# Patient Record
Sex: MALE | Race: ASIAN | NOT HISPANIC OR LATINO | URBAN - METROPOLITAN AREA
[De-identification: names, ages, dates, MRNs, and addresses within clinical notes are randomized per-mention and may not be internally consistent; named-entity substitution may affect disease eponyms.]

---

## 2018-10-10 ENCOUNTER — INPATIENT (INPATIENT)
Facility: HOSPITAL | Age: 53
LOS: 7 days | Discharge: HOME HEALTH | End: 2018-10-18
Attending: INTERNAL MEDICINE | Admitting: INTERNAL MEDICINE
Payer: MEDICAID

## 2018-10-10 VITALS
OXYGEN SATURATION: 100 % | RESPIRATION RATE: 16 BRPM | DIASTOLIC BLOOD PRESSURE: 98 MMHG | HEART RATE: 90 BPM | TEMPERATURE: 99 F | SYSTOLIC BLOOD PRESSURE: 160 MMHG

## 2018-10-10 DIAGNOSIS — I63.9 CEREBRAL INFARCTION, UNSPECIFIED: ICD-10-CM

## 2018-10-10 LAB
ALBUMIN SERPL ELPH-MCNC: 5.1 G/DL — HIGH (ref 3.3–5)
ALP SERPL-CCNC: 58 U/L — SIGNIFICANT CHANGE UP (ref 40–120)
ALT FLD-CCNC: 36 U/L — SIGNIFICANT CHANGE UP (ref 4–41)
APPEARANCE UR: CLEAR — SIGNIFICANT CHANGE UP
APTT BLD: 26.3 SEC — LOW (ref 27.5–37.4)
AST SERPL-CCNC: 17 U/L — SIGNIFICANT CHANGE UP (ref 4–40)
BACTERIA # UR AUTO: NEGATIVE — SIGNIFICANT CHANGE UP
BASE EXCESS BLDV CALC-SCNC: -1.6 MMOL/L — SIGNIFICANT CHANGE UP
BASE EXCESS BLDV CALC-SCNC: -1.7 MMOL/L — SIGNIFICANT CHANGE UP
BASOPHILS # BLD AUTO: 0.04 K/UL — SIGNIFICANT CHANGE UP (ref 0–0.2)
BASOPHILS NFR BLD AUTO: 0.2 % — SIGNIFICANT CHANGE UP (ref 0–2)
BILIRUB SERPL-MCNC: 0.5 MG/DL — SIGNIFICANT CHANGE UP (ref 0.2–1.2)
BILIRUB UR-MCNC: NEGATIVE — SIGNIFICANT CHANGE UP
BLOOD GAS VENOUS - CREATININE: 0.69 MG/DL — SIGNIFICANT CHANGE UP (ref 0.5–1.3)
BLOOD GAS VENOUS - CREATININE: 0.71 MG/DL — SIGNIFICANT CHANGE UP (ref 0.5–1.3)
BLOOD UR QL VISUAL: NEGATIVE — SIGNIFICANT CHANGE UP
BUN SERPL-MCNC: 17 MG/DL — SIGNIFICANT CHANGE UP (ref 7–23)
CALCIUM SERPL-MCNC: 10.4 MG/DL — SIGNIFICANT CHANGE UP (ref 8.4–10.5)
CHLORIDE BLDV-SCNC: 104 MMOL/L — SIGNIFICANT CHANGE UP (ref 96–108)
CHLORIDE BLDV-SCNC: 108 MMOL/L — SIGNIFICANT CHANGE UP (ref 96–108)
CHLORIDE SERPL-SCNC: 95 MMOL/L — LOW (ref 98–107)
CO2 SERPL-SCNC: 20 MMOL/L — LOW (ref 22–31)
COLOR SPEC: SIGNIFICANT CHANGE UP
CREAT SERPL-MCNC: 0.82 MG/DL — SIGNIFICANT CHANGE UP (ref 0.5–1.3)
EOSINOPHIL # BLD AUTO: 0 K/UL — SIGNIFICANT CHANGE UP (ref 0–0.5)
EOSINOPHIL NFR BLD AUTO: 0 % — SIGNIFICANT CHANGE UP (ref 0–6)
GAS PNL BLDV: 136 MMOL/L — SIGNIFICANT CHANGE UP (ref 136–146)
GAS PNL BLDV: 138 MMOL/L — SIGNIFICANT CHANGE UP (ref 136–146)
GLUCOSE BLDV-MCNC: 245 — HIGH (ref 70–99)
GLUCOSE BLDV-MCNC: 352 — HIGH (ref 70–99)
GLUCOSE SERPL-MCNC: 344 MG/DL — HIGH (ref 70–99)
GLUCOSE UR-MCNC: >1000 — HIGH
HCO3 BLDV-SCNC: 22 MMOL/L — SIGNIFICANT CHANGE UP (ref 20–27)
HCO3 BLDV-SCNC: 22 MMOL/L — SIGNIFICANT CHANGE UP (ref 20–27)
HCT VFR BLD CALC: 46.3 % — SIGNIFICANT CHANGE UP (ref 39–50)
HCT VFR BLDV CALC: 41.9 % — SIGNIFICANT CHANGE UP (ref 39–51)
HCT VFR BLDV CALC: 49.2 % — SIGNIFICANT CHANGE UP (ref 39–51)
HGB BLD-MCNC: 15.7 G/DL — SIGNIFICANT CHANGE UP (ref 13–17)
HGB BLDV-MCNC: 13.6 G/DL — SIGNIFICANT CHANGE UP (ref 13–17)
HGB BLDV-MCNC: 16.1 G/DL — SIGNIFICANT CHANGE UP (ref 13–17)
HYALINE CASTS # UR AUTO: NEGATIVE — SIGNIFICANT CHANGE UP
IMM GRANULOCYTES # BLD AUTO: 0.08 # — SIGNIFICANT CHANGE UP
IMM GRANULOCYTES NFR BLD AUTO: 0.5 % — SIGNIFICANT CHANGE UP (ref 0–1.5)
INR BLD: 1.02 — SIGNIFICANT CHANGE UP (ref 0.88–1.17)
KETONES UR-MCNC: HIGH
LACTATE BLDV-MCNC: 3.4 MMOL/L — HIGH (ref 0.5–2)
LACTATE BLDV-MCNC: 5.3 MMOL/L — CRITICAL HIGH (ref 0.5–2)
LEUKOCYTE ESTERASE UR-ACNC: NEGATIVE — SIGNIFICANT CHANGE UP
LYMPHOCYTES # BLD AUTO: 1.11 K/UL — SIGNIFICANT CHANGE UP (ref 1–3.3)
LYMPHOCYTES # BLD AUTO: 6.3 % — LOW (ref 13–44)
MAGNESIUM SERPL-MCNC: 2.3 MG/DL — SIGNIFICANT CHANGE UP (ref 1.6–2.6)
MCHC RBC-ENTMCNC: 28.6 PG — SIGNIFICANT CHANGE UP (ref 27–34)
MCHC RBC-ENTMCNC: 33.9 % — SIGNIFICANT CHANGE UP (ref 32–36)
MCV RBC AUTO: 84.3 FL — SIGNIFICANT CHANGE UP (ref 80–100)
MONOCYTES # BLD AUTO: 0.32 K/UL — SIGNIFICANT CHANGE UP (ref 0–0.9)
MONOCYTES NFR BLD AUTO: 1.8 % — LOW (ref 2–14)
NEUTROPHILS # BLD AUTO: 15.95 K/UL — HIGH (ref 1.8–7.4)
NEUTROPHILS NFR BLD AUTO: 91.2 % — HIGH (ref 43–77)
NITRITE UR-MCNC: NEGATIVE — SIGNIFICANT CHANGE UP
NRBC # FLD: 0 — SIGNIFICANT CHANGE UP
PCO2 BLDV: 37 MMHG — LOW (ref 41–51)
PCO2 BLDV: 40 MMHG — LOW (ref 41–51)
PH BLDV: 7.37 PH — SIGNIFICANT CHANGE UP (ref 7.32–7.43)
PH BLDV: 7.4 PH — SIGNIFICANT CHANGE UP (ref 7.32–7.43)
PH UR: 6 — SIGNIFICANT CHANGE UP (ref 5–8)
PLATELET # BLD AUTO: 325 K/UL — SIGNIFICANT CHANGE UP (ref 150–400)
PMV BLD: 10.1 FL — SIGNIFICANT CHANGE UP (ref 7–13)
PO2 BLDV: 26 MMHG — LOW (ref 35–40)
PO2 BLDV: 30 MMHG — LOW (ref 35–40)
POTASSIUM BLDV-SCNC: 3.6 MMOL/L — SIGNIFICANT CHANGE UP (ref 3.4–4.5)
POTASSIUM BLDV-SCNC: 4.1 MMOL/L — SIGNIFICANT CHANGE UP (ref 3.4–4.5)
POTASSIUM SERPL-MCNC: 4.2 MMOL/L — SIGNIFICANT CHANGE UP (ref 3.5–5.3)
POTASSIUM SERPL-SCNC: 4.2 MMOL/L — SIGNIFICANT CHANGE UP (ref 3.5–5.3)
PROT SERPL-MCNC: 8.8 G/DL — HIGH (ref 6–8.3)
PROT UR-MCNC: 30 — SIGNIFICANT CHANGE UP
PROTHROM AB SERPL-ACNC: 11.7 SEC — SIGNIFICANT CHANGE UP (ref 9.8–13.1)
RBC # BLD: 5.49 M/UL — SIGNIFICANT CHANGE UP (ref 4.2–5.8)
RBC # FLD: 12.4 % — SIGNIFICANT CHANGE UP (ref 10.3–14.5)
RBC CASTS # UR COMP ASSIST: SIGNIFICANT CHANGE UP (ref 0–?)
SAO2 % BLDV: 39.8 % — LOW (ref 60–85)
SAO2 % BLDV: 47.3 % — LOW (ref 60–85)
SODIUM SERPL-SCNC: 138 MMOL/L — SIGNIFICANT CHANGE UP (ref 135–145)
SP GR SPEC: > 1.05 — HIGH (ref 1–1.04)
SQUAMOUS # UR AUTO: SIGNIFICANT CHANGE UP
TROPONIN T, HIGH SENSITIVITY: < 6 NG/L — SIGNIFICANT CHANGE UP (ref ?–14)
UROBILINOGEN FLD QL: NORMAL — SIGNIFICANT CHANGE UP
WBC # BLD: 17.5 K/UL — HIGH (ref 3.8–10.5)
WBC # FLD AUTO: 17.5 K/UL — HIGH (ref 3.8–10.5)
WBC UR QL: SIGNIFICANT CHANGE UP (ref 0–?)

## 2018-10-10 PROCEDURE — 70498 CT ANGIOGRAPHY NECK: CPT | Mod: 26

## 2018-10-10 PROCEDURE — 70496 CT ANGIOGRAPHY HEAD: CPT | Mod: 26

## 2018-10-10 PROCEDURE — 71046 X-RAY EXAM CHEST 2 VIEWS: CPT | Mod: 26

## 2018-10-10 RX ORDER — ONDANSETRON 8 MG/1
4 TABLET, FILM COATED ORAL ONCE
Qty: 0 | Refills: 0 | Status: COMPLETED | OUTPATIENT
Start: 2018-10-10 | End: 2018-10-10

## 2018-10-10 RX ORDER — SODIUM CHLORIDE 9 MG/ML
500 INJECTION INTRAMUSCULAR; INTRAVENOUS; SUBCUTANEOUS ONCE
Qty: 0 | Refills: 0 | Status: COMPLETED | OUTPATIENT
Start: 2018-10-10 | End: 2018-10-10

## 2018-10-10 RX ORDER — MECLIZINE HCL 12.5 MG
12.5 TABLET ORAL ONCE
Qty: 0 | Refills: 0 | Status: COMPLETED | OUTPATIENT
Start: 2018-10-10 | End: 2018-10-10

## 2018-10-10 RX ORDER — ACETAMINOPHEN 500 MG
650 TABLET ORAL ONCE
Qty: 0 | Refills: 0 | Status: COMPLETED | OUTPATIENT
Start: 2018-10-10 | End: 2018-10-10

## 2018-10-10 RX ORDER — MECLIZINE HCL 12.5 MG
25 TABLET ORAL ONCE
Qty: 0 | Refills: 0 | Status: COMPLETED | OUTPATIENT
Start: 2018-10-10 | End: 2018-10-10

## 2018-10-10 RX ORDER — SODIUM CHLORIDE 9 MG/ML
2000 INJECTION INTRAMUSCULAR; INTRAVENOUS; SUBCUTANEOUS ONCE
Qty: 0 | Refills: 0 | Status: COMPLETED | OUTPATIENT
Start: 2018-10-10 | End: 2018-10-10

## 2018-10-10 RX ORDER — IBUPROFEN 200 MG
600 TABLET ORAL ONCE
Qty: 0 | Refills: 0 | Status: COMPLETED | OUTPATIENT
Start: 2018-10-10 | End: 2018-10-10

## 2018-10-10 RX ADMIN — Medication 600 MILLIGRAM(S): at 22:26

## 2018-10-10 RX ADMIN — ONDANSETRON 4 MILLIGRAM(S): 8 TABLET, FILM COATED ORAL at 19:07

## 2018-10-10 RX ADMIN — Medication 1 MILLIGRAM(S): at 22:25

## 2018-10-10 RX ADMIN — Medication 650 MILLIGRAM(S): at 19:07

## 2018-10-10 RX ADMIN — Medication 600 MILLIGRAM(S): at 21:18

## 2018-10-10 RX ADMIN — SODIUM CHLORIDE 500 MILLILITER(S): 9 INJECTION INTRAMUSCULAR; INTRAVENOUS; SUBCUTANEOUS at 19:08

## 2018-10-10 RX ADMIN — Medication 12.5 MILLIGRAM(S): at 22:26

## 2018-10-10 RX ADMIN — SODIUM CHLORIDE 2000 MILLILITER(S): 9 INJECTION INTRAMUSCULAR; INTRAVENOUS; SUBCUTANEOUS at 19:50

## 2018-10-10 RX ADMIN — Medication 25 MILLIGRAM(S): at 19:07

## 2018-10-10 RX ADMIN — Medication 650 MILLIGRAM(S): at 19:39

## 2018-10-10 NOTE — ED ADULT TRIAGE NOTE - CHIEF COMPLAINT QUOTE
c/o severe dizziness, headache, vomiting, left side numbness since 8 AM this morning. Patient was seen by Dr. Horne in triage, no code stroke was called. Hx: DM

## 2018-10-10 NOTE — ED PROVIDER NOTE - MEDICAL DECISION MAKING DETAILS
Pt is a 53 y.o male with PMHx of DM (on metformin) who presents to ED c/o Lt UE/LE paresthesia a/w dizziness, headache and n/v since 7/8 am this morning.  DDx- includes but not limited to; TIA, vertigo R/O CVA, ACS  Plan- Labs, trop, ecg, cxr, CT brain, CTA H/N, fluids, zofran, meclizine, will monitor and reassess

## 2018-10-10 NOTE — CONSULT NOTE ADULT - ASSESSMENT
53 year old  Swedish male presenting with dizziness. Patient has PMH of diabetic neuropathy. Patient states he went to work this morning and around 8am started to feel lightheaded like he was going to pass out. Reports generalized weakness, nausea, vomiting. Had a similar episode about 2 weeks ago which resolved. Yesterday he had pizza which was not his normal diet. For the past several months, has intermittent left sided numbness which gradually became sustained. Also reports phonophobia, photophobia, mild left frontal dull headache. Drinks about 10 shots every other week.  On neuro exam, sustained right beating nystagmus on primary, right, and up gaze. Decreased light touch on LUE and LLE.   NIHSS 1 preMRS 0  WBC noted to be 17.5, lactate 5.3    Symptoms may be secondary to infectious etiology, should rule out underlying metabolic derangements, demyelinating disease, central etiology    Recommend:  CTH  CTA head and neck  B12, folate, thiamine, SPEP, immunofixation, copper  MRI brain with and without contrast  Infectious work up and management as per primary team 53 year old  Citizen of [place] male presenting with dizziness. Patient has PMH of diabetic neuropathy. Patient states he went to work this morning and around 8am started to feel lightheaded like he was going to pass out. Reports generalized weakness, nausea, vomiting. Had a similar episode about 2 weeks ago which resolved. Yesterday he had pizza which was not his normal diet. For the past several months, has intermittent left sided numbness which gradually became sustained. Also reports phonophobia, photophobia, mild left frontal dull headache. Drinks about 10 shots every other week.  On neuro exam, sustained right beating nystagmus on primary, right, and up gaze. Decreased light touch on LUE and LLE.   NIHSS 1 preMRS 0  WBC noted to be 17.5, lactate 5.3    Symptoms may be secondary to infectious etiology, should rule out underlying metabolic derangements, demyelinating disease, central etiology    Recommend:  CTH  CTA head and neck  B12, folate, thiamine, SPEP, immunofixation, copper  MRI brain with and without contrast  IV fluids  Reglan  Meclizine  Infectious work up and management as per primary team 53 year old  [place] male presenting with dizziness. Patient has PMH of diabetic neuropathy. Patient states he went to work this morning and around 8am started to feel lightheaded like he was going to pass out. Reports generalized weakness, nausea, vomiting. Had a similar episode about 2 weeks ago which resolved. Yesterday he had pizza which was not his normal diet. For the past several months, has intermittent left sided numbness which gradually became sustained. Also reports phonophobia, photophobia, mild left frontal dull headache. Drinks about 10 shots every other week.  On neuro exam, sustained right beating nystagmus on primary, right, and up gaze. Decreased light touch on LUE and LLE.   CTH showed Age-indeterminate lacunar infarcts involve the right thalamus and right lentiform nucleus. Chronic lacunar infarcts involve the bilateral corona radiata   CTA H/N showed 40% stenosis involves origin of the R ICA and 10% L ICA  NIHSS 1 preMRS 0  WBC noted to be 17.5, lactate 5.3    Age-indeterminate lacunar infarcts involve the right thalamus and right lentiform nucleus. Chronic lacunar infarcts involve the bilateral corona radiata likely secondary to small vessel disease    Recommend:  B12, folate, thiamine, SPEP, immunofixation, copper  MRI brain without contrast  TTE with bubble study for possible valvular heart disease  ASA 81 mg PO QD once patient passes swallow evaluation, if not,  DC  Lipitor 80 mg PO QHS  DVT prophylaxis with heparin/lovenox  HbA1c, LDL and continue with aggressive vascular risk factors modifications   NPO until patient passes dysphagia screen  Tele monitor  PT/OT/S&S eval    IV fluids  Reglan  Meclizine  Infectious work up and management as per primary team

## 2018-10-10 NOTE — ED PROVIDER NOTE - PHYSICAL EXAMINATION
Vital signs reviewed.   CONSTITUTIONAL: Well-appearing; in no apparent distress. Non-toxic appearing.   HEAD: Normocephalic, atraumatic. No crepitus or step offs.   EYES: PERRL, EOM intact, conjunctiva and sclera WNL. +horizontal nystagmus noted.   ENT: normal nose; no rhinorrhea  NECK/LYMPH: Supple; non-tender; no cervical lymphadenopathy. No midline tenderness.   CARD: Normal S1, S2; no murmurs, rubs, or gallops noted.  RESP: Normal chest excursion with respiration; breath sounds clear and equal bilaterally; no wheezes, rhonchi, or rales.  ABD/GI: soft, non-distended; non-tender; no palpable organomegaly, no pulsatile mass. NCVAT b/l.   EXT/MS: moves all extremities; distal pulses are normal, no pedal edema. Compartments soft. No bony tenderness noted.   SKIN: Normal for age and race; warm; dry; good turgor; no apparent lesions or exudate noted.  NEURO: Awake, alert, oriented x 3, no gross deficits, +subjective decreased sensation to Lt extremity vs Rt. +Lt side pronator drift. No slurred speech. No facial droop. 5/5 strength UE/LE b/l.   PSYCH: Normal mood; appropriate affect.

## 2018-10-10 NOTE — CONSULT NOTE ADULT - SUBJECTIVE AND OBJECTIVE BOX
Neurology Consult    Reason for consult: L sided paresthesia    HPI: Patient is a 53 year old  [place]an male presenting with dizziness. Patient has PMH of diabetic neuropathy. Patient states he went to work this morning and around 8am started to feel lightheaded like he was going to pass out. Reports generalized weakness, nausea, vomiting. Had a similar episode about 2 weeks ago which resolved. Yesterday he had pizza which was not his normal diet. For the past several months, has intermittent left sided numbness which gradually became sustained. Also reports phonophobia, photophobia, mild left frontal dull headache. Drinks about 10 shots every other week.    REVIEW OF SYSTEMS:  Constitutional: No fever, chills, fatigue, weakness.  Eyes: No eye pain, visual disturbances, or discharge.  ENT:  No difficulty hearing, tinnitus, vertigo. No sinus or throat pain.  Neck: No pain or stiffness.  Respiratory: No cough, dyspnea, wheezing.  Cardiovascular: No chest pain, palpitations.  Gastrointestinal: No abdominal pain. nausea, vomiting. No diarrhea, or constipation.   Genitourinary: No dysuria, frequency, hematuria or incontinence.  Neurological: headaches, lightheadedness, numbness.  Psychiatric: No depression, anxiety, mood swings, or difficulty sleeping.  Musculoskeletal: No joint pain or swelling. No muscle, back, or extremity pain.  Skin: No itching, burning, rashes or lesions.   Lymph Nodes: No enlarged glands  Endocrine: No heat or cold intolerance, no hair loss.  Allergy and Immunologic: No hives or eczema.    MEDICATIONS      PMH: Diabetes       PSH: No significant past surgical history      FAMILY HISTORY:  No pertinent family history in first degree relatives  No history of dementia, strokes, or seizures     SOCIAL HISTORY:  No history of tobacco or alcohol use     Allergies  No Known Allergies    Vital Signs Last 24 Hrs  T(C): 36.7 (10 Oct 2018 19:08), Max: 37.1 (10 Oct 2018 16:50)  T(F): 98 (10 Oct 2018 19:08), Max: 98.8 (10 Oct 2018 16:50)  HR: 77 (10 Oct 2018 19:08) (77 - 90)  BP: 157/87 (10 Oct 2018 19:08) (157/87 - 160/98)  RR: 18 (10 Oct 2018 19:08) (16 - 18)  SpO2: 100% (10 Oct 2018 19:08) (100% - 100%)    Neurological Examination:    Mental Status: Patient is alert, awake, oriented X3. Patient is fluent, no dysarthria, no aphasia. Follows commands well and able to answer questions appropriately. Mood and affect normal.    Cranial Nerves: PERRL, EOMI, visual field intact, V1-V3 intact, no gross facial asymmetry, tongue/uvula midline. Sustained right beating nystagmus on primary, right, and up gaze    Motor Exam: No drift  Right upper extremity: 5/5  Left upper extremity: 5/5  Right lower extremity: 5/5  Left lower extremity: 5/5    Normal bulk/tone    Sensory: Decreased sensation to light touch on left upper and lower extremities    Coordination: Finger to nose and heel to shin intact bilaterally     Gait: Deferred, patient does not want to walk    Reflexes: Bilateral 2+ Biceps, Brachial, Patellar  Plantar: Down bilateral    GENERAL: No acute distress  HEENT:  Normocephalic, atraumatic  EXTREMITIES: No edema, clubbing, cyanosis  MUSCULOSKELETAL: Normal range of motion  SKIN: No rashes    LABS:  CBC Full  -  ( 10 Oct 2018 19:00 )  WBC Count : 17.50 K/uL  Hemoglobin : 15.7 g/dL  Hematocrit : 46.3 %  Platelet Count - Automated : 325 K/uL  Mean Cell Volume : 84.3 fL  Mean Cell Hemoglobin : 28.6 pg  Mean Cell Hemoglobin Concentration : 33.9 %  Auto Neutrophil # : 15.95 K/uL  Auto Lymphocyte # : 1.11 K/uL  Auto Monocyte # : 0.32 K/uL  Auto Eosinophil # : 0.00 K/uL  Auto Basophil # : 0.04 K/uL  Auto Neutrophil % : 91.2 %  Auto Lymphocyte % : 6.3 %  Auto Monocyte % : 1.8 %  Auto Eosinophil % : 0.0 %  Auto Basophil % : 0.2 %      10-10    138  |  95<L>  |  17  ----------------------------<  344<H>  4.2   |  20<L>  |  0.82    Ca    10.4      10 Oct 2018 19:00  Mg     2.3     10-10    TPro  8.8<H>  /  Alb  5.1<H>  /  TBili  0.5  /  DBili  x   /  AST  17  /  ALT  36  /  AlkPhos  58  10-10    LIVER FUNCTIONS - ( 10 Oct 2018 19:00 )  Alb: 5.1 g/dL / Pro: 8.8 g/dL / ALK PHOS: 58 u/L / ALT: 36 u/L / AST: 17 u/L / GGT: x           Hemoglobin A1C:     PT/INR - ( 10 Oct 2018 19:00 )   PT: 11.7 SEC;   INR: 1.02        PTT - ( 10 Oct 2018 19:00 )  PTT:26.3 SEC

## 2018-10-10 NOTE — ED PROVIDER NOTE - PROGRESS NOTE DETAILS
Consult placed to Neurology, pending callback Consult placed to Neurology, spoke to resident regarding patients sx and pe. Pt pending CTA's. Neuro states they will evaluate patient. Pt signed out to Resident. Mickey Lea MD, PGY1: Patient received at resident sign out.

## 2018-10-10 NOTE — ED ADULT NURSE NOTE - OBJECTIVE STATEMENT
Guerda RN- Pt received aa&ox3 PMH DM on metformin p/w dizziness, HA, and rt arm numbness starting at 8am this morning. Pt states he was at work when he began to dizzy. Pt denies cp, sob, visual changes, fevers/chills. 20g IV placed in RT AC labs sent. Area RN made aware.

## 2018-10-10 NOTE — ED PROVIDER NOTE - ATTENDING CONTRIBUTION TO CARE
54 yo DM on metformin a/w L UE and LE paresthesia with associated dizziness and headache since 7 AM this morning.  Pt reports symptoms progressively worsened throughout the day with 10-15 episodes of acute nausea and vomiting.  Pt took asa and bp meds this am.  Denies weakness, facial droop, slurred speech, confusion, cp, sob pleuritic pain, diarrhea, abd pain, vertigo like symptoms, fevers, chills, back pain, dysuria, hematuria.      Fx neg for CAD and CVA    GEN - NAD; well appearing; A+O x3; non-toxic appearing CARD -s1s2, RRR, no M,G,R; PULM - CTA b/l, symmetric breath sounds; ABD:  +BS, ND, NT, soft, no guarding, no rebound, no masses; BACK: no CVA tenderness, Normal  spine; EXT: symmetric pulses, 2+ dp, capillary refill < 2 seconds, no clubbing, no cyanosis, no edema NEURO: alert, CN 2-12 intact, reflexes nl, +numbness on L upper and lower ext, coordination nl, finger to nose nl, romberg negative, motor 5/5 RUE/LUE/RLE/LLE/EHL/Plantar flexion, no pronator drift, gait nl + nystagumus with right sided gaze, vomited when performing félix hallpike    54 yo M a/w possible BPPV with possible acute CVA started this AM, out of the window  -cta, cbc cmp,   - ekg  - neuroconsult  - possible obs for MRI and echo

## 2018-10-10 NOTE — ED ADULT NURSE NOTE - NSIMPLEMENTINTERV_GEN_ALL_ED
Implemented All Universal Safety Interventions:  [place] to call system. Call bell, personal items and telephone within reach. Instruct patient to call for assistance. Room bathroom lighting operational. Non-slip footwear when patient is off stretcher. Physically safe environment: no spills, clutter or unnecessary equipment. Stretcher in lowest position, wheels locked, appropriate side rails in place.

## 2018-10-10 NOTE — ED PROVIDER NOTE - OBJECTIVE STATEMENT
HPI: Pt is a 53 y.o male with PMHx of DM (on metformin) who presents to ED c/o Lt UE/LE paresthesia a/w dizziness, headache and n/v since 7/8 am this morning. As per patient states he woke up feeling fine, went to work and immediately states he felt dizzy and had slight headache so went home. Pt then notes he began experiencing Lt UE/LE numbness and tingling that has been constant since this AM. Also states sx exacerbated with change in position causing exacerbation of n/v. Pt admits to >10 episodes of NBNB emesis today. Wife states she gave patient her BP medication and a baby ASA. Pt denies changes in speech, facial droop, confusion, cp, sob, pleuritic pain, diarrhea, abdominal pain, changes in vision, room-spinning, fevers, chills, back pain, dysuria, hematuria, recent travel, changes in bowel or bladder habits, hx of smoking, FHX of CAD or stroke or any other complaints.

## 2018-10-11 DIAGNOSIS — E11.65 TYPE 2 DIABETES MELLITUS WITH HYPERGLYCEMIA: ICD-10-CM

## 2018-10-11 DIAGNOSIS — I63.9 CEREBRAL INFARCTION, UNSPECIFIED: ICD-10-CM

## 2018-10-11 DIAGNOSIS — Z29.9 ENCOUNTER FOR PROPHYLACTIC MEASURES, UNSPECIFIED: ICD-10-CM

## 2018-10-11 DIAGNOSIS — D72.829 ELEVATED WHITE BLOOD CELL COUNT, UNSPECIFIED: ICD-10-CM

## 2018-10-11 DIAGNOSIS — E86.0 DEHYDRATION: ICD-10-CM

## 2018-10-11 LAB
BUN SERPL-MCNC: 13 MG/DL — SIGNIFICANT CHANGE UP (ref 7–23)
CALCIUM SERPL-MCNC: 9.4 MG/DL — SIGNIFICANT CHANGE UP (ref 8.4–10.5)
CHLORIDE SERPL-SCNC: 101 MMOL/L — SIGNIFICANT CHANGE UP (ref 98–107)
CK MB BLD-MCNC: 2.6 — HIGH (ref 0–2.5)
CK MB BLD-MCNC: 4.76 NG/ML — SIGNIFICANT CHANGE UP (ref 1–6.6)
CK SERPL-CCNC: 183 U/L — SIGNIFICANT CHANGE UP (ref 30–200)
CO2 SERPL-SCNC: 20 MMOL/L — LOW (ref 22–31)
CREAT SERPL-MCNC: 0.64 MG/DL — SIGNIFICANT CHANGE UP (ref 0.5–1.3)
FOLATE SERPL-MCNC: 16.4 NG/ML — SIGNIFICANT CHANGE UP (ref 4.7–20)
GLUCOSE SERPL-MCNC: 186 MG/DL — HIGH (ref 70–99)
HBA1C BLD-MCNC: 10.1 % — HIGH (ref 4–5.6)
HCT VFR BLD CALC: 42.8 % — SIGNIFICANT CHANGE UP (ref 39–50)
HGB BLD-MCNC: 14 G/DL — SIGNIFICANT CHANGE UP (ref 13–17)
MAGNESIUM SERPL-MCNC: 2.2 MG/DL — SIGNIFICANT CHANGE UP (ref 1.6–2.6)
MCHC RBC-ENTMCNC: 28.8 PG — SIGNIFICANT CHANGE UP (ref 27–34)
MCHC RBC-ENTMCNC: 32.7 % — SIGNIFICANT CHANGE UP (ref 32–36)
MCV RBC AUTO: 88.1 FL — SIGNIFICANT CHANGE UP (ref 80–100)
NRBC # FLD: 0 — SIGNIFICANT CHANGE UP
PHOSPHATE SERPL-MCNC: 1.8 MG/DL — LOW (ref 2.5–4.5)
PLATELET # BLD AUTO: 319 K/UL — SIGNIFICANT CHANGE UP (ref 150–400)
PMV BLD: 10.8 FL — SIGNIFICANT CHANGE UP (ref 7–13)
POTASSIUM SERPL-MCNC: 3.8 MMOL/L — SIGNIFICANT CHANGE UP (ref 3.5–5.3)
POTASSIUM SERPL-SCNC: 3.8 MMOL/L — SIGNIFICANT CHANGE UP (ref 3.5–5.3)
RBC # BLD: 4.86 M/UL — SIGNIFICANT CHANGE UP (ref 4.2–5.8)
RBC # FLD: 12.7 % — SIGNIFICANT CHANGE UP (ref 10.3–14.5)
SODIUM SERPL-SCNC: 139 MMOL/L — SIGNIFICANT CHANGE UP (ref 135–145)
TROPONIN T, HIGH SENSITIVITY: < 6 NG/L — SIGNIFICANT CHANGE UP (ref ?–14)
TSH SERPL-MCNC: 1.7 UIU/ML — SIGNIFICANT CHANGE UP (ref 0.27–4.2)
VIT B12 SERPL-MCNC: 462 PG/ML — SIGNIFICANT CHANGE UP (ref 200–900)
WBC # BLD: 15.5 K/UL — HIGH (ref 3.8–10.5)
WBC # FLD AUTO: 15.5 K/UL — HIGH (ref 3.8–10.5)

## 2018-10-11 PROCEDURE — 12345: CPT | Mod: NC

## 2018-10-11 PROCEDURE — 86334 IMMUNOFIX E-PHORESIS SERUM: CPT | Mod: 26

## 2018-10-11 PROCEDURE — 99223 1ST HOSP IP/OBS HIGH 75: CPT

## 2018-10-11 PROCEDURE — 99255 IP/OBS CONSLTJ NEW/EST HI 80: CPT

## 2018-10-11 PROCEDURE — 70551 MRI BRAIN STEM W/O DYE: CPT | Mod: 26

## 2018-10-11 RX ORDER — GLUCAGON INJECTION, SOLUTION 0.5 MG/.1ML
1 INJECTION, SOLUTION SUBCUTANEOUS ONCE
Qty: 0 | Refills: 0 | Status: DISCONTINUED | OUTPATIENT
Start: 2018-10-11 | End: 2018-10-18

## 2018-10-11 RX ORDER — SODIUM CHLORIDE 9 MG/ML
1000 INJECTION, SOLUTION INTRAVENOUS
Qty: 0 | Refills: 0 | Status: DISCONTINUED | OUTPATIENT
Start: 2018-10-11 | End: 2018-10-18

## 2018-10-11 RX ORDER — DEXTROSE 50 % IN WATER 50 %
15 SYRINGE (ML) INTRAVENOUS ONCE
Qty: 0 | Refills: 0 | Status: DISCONTINUED | OUTPATIENT
Start: 2018-10-11 | End: 2018-10-18

## 2018-10-11 RX ORDER — INSULIN LISPRO 100/ML
VIAL (ML) SUBCUTANEOUS
Qty: 0 | Refills: 0 | Status: DISCONTINUED | OUTPATIENT
Start: 2018-10-11 | End: 2018-10-16

## 2018-10-11 RX ORDER — DEXTROSE 50 % IN WATER 50 %
12.5 SYRINGE (ML) INTRAVENOUS ONCE
Qty: 0 | Refills: 0 | Status: DISCONTINUED | OUTPATIENT
Start: 2018-10-11 | End: 2018-10-18

## 2018-10-11 RX ORDER — DEXTROSE 50 % IN WATER 50 %
25 SYRINGE (ML) INTRAVENOUS ONCE
Qty: 0 | Refills: 0 | Status: DISCONTINUED | OUTPATIENT
Start: 2018-10-11 | End: 2018-10-18

## 2018-10-11 RX ORDER — SODIUM CHLORIDE 9 MG/ML
1000 INJECTION INTRAMUSCULAR; INTRAVENOUS; SUBCUTANEOUS
Qty: 0 | Refills: 0 | Status: DISCONTINUED | OUTPATIENT
Start: 2018-10-11 | End: 2018-10-12

## 2018-10-11 RX ORDER — ASPIRIN/CALCIUM CARB/MAGNESIUM 324 MG
81 TABLET ORAL DAILY
Qty: 0 | Refills: 0 | Status: DISCONTINUED | OUTPATIENT
Start: 2018-10-11 | End: 2018-10-18

## 2018-10-11 RX ORDER — INFLUENZA VIRUS VACCINE 15; 15; 15; 15 UG/.5ML; UG/.5ML; UG/.5ML; UG/.5ML
0.5 SUSPENSION INTRAMUSCULAR ONCE
Qty: 0 | Refills: 0 | Status: COMPLETED | OUTPATIENT
Start: 2018-10-11 | End: 2018-10-18

## 2018-10-11 RX ORDER — INSULIN LISPRO 100/ML
VIAL (ML) SUBCUTANEOUS AT BEDTIME
Qty: 0 | Refills: 0 | Status: DISCONTINUED | OUTPATIENT
Start: 2018-10-11 | End: 2018-10-16

## 2018-10-11 RX ORDER — ATORVASTATIN CALCIUM 80 MG/1
80 TABLET, FILM COATED ORAL AT BEDTIME
Qty: 0 | Refills: 0 | Status: DISCONTINUED | OUTPATIENT
Start: 2018-10-11 | End: 2018-10-18

## 2018-10-11 RX ORDER — HEPARIN SODIUM 5000 [USP'U]/ML
5000 INJECTION INTRAVENOUS; SUBCUTANEOUS EVERY 12 HOURS
Qty: 0 | Refills: 0 | Status: DISCONTINUED | OUTPATIENT
Start: 2018-10-11 | End: 2018-10-18

## 2018-10-11 RX ADMIN — Medication 1: at 21:36

## 2018-10-11 RX ADMIN — Medication 1: at 09:39

## 2018-10-11 RX ADMIN — Medication 81 MILLIGRAM(S): at 04:28

## 2018-10-11 RX ADMIN — Medication 2: at 17:27

## 2018-10-11 RX ADMIN — HEPARIN SODIUM 5000 UNIT(S): 5000 INJECTION INTRAVENOUS; SUBCUTANEOUS at 21:37

## 2018-10-11 RX ADMIN — Medication 2: at 13:44

## 2018-10-11 RX ADMIN — ATORVASTATIN CALCIUM 80 MILLIGRAM(S): 80 TABLET, FILM COATED ORAL at 21:36

## 2018-10-11 RX ADMIN — SODIUM CHLORIDE 100 MILLILITER(S): 9 INJECTION INTRAMUSCULAR; INTRAVENOUS; SUBCUTANEOUS at 04:28

## 2018-10-11 RX ADMIN — HEPARIN SODIUM 5000 UNIT(S): 5000 INJECTION INTRAVENOUS; SUBCUTANEOUS at 09:15

## 2018-10-11 NOTE — H&P ADULT - ASSESSMENT
54 y/o M with PMH of DM presented with the complaint of left sided UE/LE numbness and weakness with dizziness and headache since this morning. R/o CVA     NIHSS 1 				preMRS 0			Dysphagia=Passed    + Age-indeterminate lacunar infarcts involve the right thalamus and right lentiform nucleus

## 2018-10-11 NOTE — H&P ADULT - SENSORY
Decreased sensation to light touch on the left side of the face, LUE and LLE compared to contralateral side

## 2018-10-11 NOTE — H&P ADULT - PROBLEM SELECTOR PLAN 1
Monitor on telemetry to r/o arrhythmia   HgbA1C, TSH, lipid profile, CBC, CMP, B12, Folate, Immunofixation, Serum copper,    House neurology following and their recommendation noted   MRI head w/o ordered    TTE with bubble study ordered   Continue with aspirin 81mg and Lipitor 80mg QHS daily   Seizure, fall and aspiration precautions   Elevate head of the bed to 30 degree  PT/OT ordered   Speech and swallow ordered, placed on dysphagia puree diet   Neuro checks q4hrs   Vital signs q4hrs

## 2018-10-11 NOTE — H&P ADULT - NEGATIVE OPHTHALMOLOGIC SYMPTOMS
no lacrimation R/no diplopia/no photophobia/no blurred vision R/no lacrimation L/no discharge L/no blurred vision L/no discharge R

## 2018-10-11 NOTE — H&P ADULT - NEGATIVE GENERAL GENITOURINARY SYMPTOMS
h/o  Sinus Surgery - amny yrs ago and septoplasty    History of Appendectomy    Inguinal Hernia right - many yrs ago    S/P appendectomy    S/P hernia repair    S/P tonsillectomy
no hematuria/no flank pain R/no flank pain L/no dysuria/no renal colic

## 2018-10-11 NOTE — H&P ADULT - NEGATIVE MUSCULOSKELETAL SYMPTOMS
no myalgia/no joint swelling/no neck pain/no muscle cramps/no muscle weakness/no arthritis/no arthralgia

## 2018-10-11 NOTE — H&P ADULT - HISTORY OF PRESENT ILLNESS
52 y/o right handed Uz[place] M with PMH of DM presented with the complaint of left sided UE/LE numbness and weakness with dizziness and headache since this morning. As per the patient he was in his usual state of health and was on his way to go to work this morning and then developed left sided upper and lower extremity weakness and numbness. At work patient developed frontal and parietal headache with associated dizziness. Patient stated that when he walked he felt like he was about to fall due to the dizziness. Patient also stated that 2/2 to the dizziness he had 20 episodes of NBNB vomiting today prior to coming to the ER. Patient stated that when he was walking he felt like he was dragging his left foot. Patient denied any slurred speech, changes in vision, difficulty swallowing. Patient denied any 54 y/o right handed Maur[place] M with PMH of DM presented with the complaint of left sided UE/LE numbness and weakness with dizziness and headache since this morning. As per the patient he was in his usual state of health and was on his way to go to work this morning and then developed left sided upper and lower extremity weakness and numbness. At work patient developed frontal and parietal headache with associated dizziness. Patient stated that when he walked he felt like he was about to fall due to the dizziness. Patient also stated that 2/2 to the dizziness he had 20 episodes of NBNB vomiting today prior to coming to the ER. Patient stated that when he was walking he felt like he was dragging his left foot. Patient denied any slurred speech, changes in vision, difficulty swallowing. Patient denied any CP, fevers, SOB, D/C, abdominal pain, dysuria, melena, hematochezia, recent travel, sick contact, pleuritic or positional chest pain.     On ED admission EKG revealed  NSR at a rate of 83 with TWI in leads AVF and V5-V6 and QTc of 425, CE x 1: Negative, WBC: 17.50, Gluc: 344, Protein: 88, Alb: 5.1, Lactate: 3.4. Head CT: Age-indeterminate lacunar infarcts involve the right thalamus and right lentiform nucleus. Chronic lacunar infarcts and mild chronic white matter changes are noted as described. If the patient has a new and persistent  neurologic deficit, consider brain MRI imaging follow-up. CTA NECK: Mild to moderate stenosis involves origin of the right internal carotid artery. Minimal stenosis involves origin of the left internal carotid artery. No evidence for vertebral artery stenosis. Patient was seen by neurology and their recommendation noted. 52 y/o right handed Welsh M with PMH of DM presented with the complaint of left sided UE/LE numbness and weakness with dizziness and headache since this morning. As per the patient he was in his usual state of health and was on his way to go to work this morning and then developed left sided upper and lower extremity weakness and numbness. At work patient developed frontal and parietal headache with associated dizziness, denied any photophobia or phonophobia. Patient stated that he took a baby aspirin when the headache started. Patient stated that when he walked he felt like he was about to fall due to the dizziness. Patient also stated that 2/2 to the dizziness he had 20 episodes of NBNB vomiting today prior to coming to the ER. Patient stated that when he was walking he felt like he was dragging his left foot. Patient denied any slurred speech, changes in vision, difficulty swallowing. Patient denied any CP, fevers, SOB, D/C, abdominal pain, dysuria, melena, hematochezia, recent travel, sick contact, pleuritic or positional chest pain.     On ED admission EKG revealed  NSR at a rate of 83 with TWI in leads AVF and V5-V6 and QTc of 425, CE x 1: Negative, WBC: 17.50, Gluc: 344, Protein: 88, Alb: 5.1, Lactate: 3.4. Head CT: Age-indeterminate lacunar infarcts involve the right thalamus and right lentiform nucleus. Chronic lacunar infarcts and mild chronic white matter changes are noted as described. If the patient has a new and persistent  neurologic deficit, consider brain MRI imaging follow-up. CTA NECK: Mild to moderate stenosis involves origin of the right internal carotid artery. Minimal stenosis involves origin of the left internal carotid artery. No evidence for vertebral artery stenosis. Patient was seen by neurology and their recommendation noted. 54 y/o right handed [place] M with PMH of DM presented with the complaint of left sided UE/LE numbness and weakness with dizziness and headache since this morning. As per the patient he was in his usual state of health and was on his way to go to work this morning and then developed left sided upper and lower extremity weakness and numbness. At work patient developed frontal and parietal headache with associated dizziness, denied any photophobia or phonophobia. Patient stated that he took a baby aspirin when the headache started. Patient stated that when he walked he felt like he was about to fall due to the dizziness. Patient also stated that 2/2 to the dizziness he had 20 episodes of NBNB vomiting today prior to coming to the ER. Patient stated that when he was walking he felt like he was dragging his left foot. Patient denied any slurred speech, changes in vision, difficulty swallowing. Patient denied any CP, fevers, SOB, D/C, abdominal pain, dysuria, melena, hematochezia, recent travel, sick contact, pleuritic or positional chest pain.     On ED admission EKG revealed  NSR at a rate of 83 with TWI in leads AVF and V5-V6 and QTc of 425, CE x 1: Negative, WBC: 17.50, Gluc: 344, Protein: 88, Alb: 5.1, Lactate: 3.4. Head CT: Age-indeterminate lacunar infarcts involve the right thalamus and right lentiform nucleus. Chronic lacunar infarcts and mild chronic white matter changes are noted as described. If the patient has a new and persistent  neurologic deficit, consider brain MRI imaging follow-up. CTA NECK: Mild to moderate stenosis involves origin of the right internal carotid artery. Minimal stenosis involves origin of the left internal carotid artery. No evidence for vertebral artery stenosis. Patient was seen by neurology and their recommendation noted.        No family history pertinent to patient’s current condition/encounter

## 2018-10-11 NOTE — OCCUPATIONAL THERAPY INITIAL EVALUATION ADULT - PERTINENT HX OF CURRENT PROBLEM, REHAB EVAL
54 y/o right handed M with PMH of DM p/w the c/o left sided UE/LE numbness and weakness with dizziness and headache since this morning. Head CT: Age-indeterminate lacunar infarcts involve the right thalamus and right lentiform nucleus. Chronic lacunar infarcts and mild chronic white matter changes.

## 2018-10-11 NOTE — PATIENT PROFILE ADULT - INFORMATION PROVIDED TO:
ED Positive Culture Follow-up/Notification Note:    Date: 3/22/17     Patient seen in the ED on 3/19/2017 for right-sided neck swelling.   I&D performed in ED with drainage of 7 cc of thick, purulent, yellow material.    1. Neck abscess       Discharge Medication List as of 3/19/2017 11:40 PM      START taking these medications    Details   sulfamethoxazole-trimethoprim (BACTRIM DS) 800-160 MG tablet Take 1 Tab by mouth 2 times a day for 10 days., Disp-20 Tab, R-0, Print Rx Paper      cefdinir (OMNICEF) 300 MG Cap Take 1 Cap by mouth 2 times a day for 10 days., Disp-20 Cap, R-0, Print Rx Paper      hydrocodone-acetaminophen (NORCO) 5-325 MG Tab per tablet Take 1-2 Tabs by mouth every four hours as needed., Disp-20 Tab, R-0, Print Rx Paper             Allergies: Review of patient's allergies indicates no known allergies.     Final cultures:   Results     Procedure Component Value Units Date/Time    CULTURE WOUND W/ GRAM STAIN [[id number]]  (Abnormal) Collected:  03/19/17 2340    Order Status:  Completed Specimen Information:  Wound from Abscess Updated:  03/22/17 1226     Gram Stain Result --      Result:        Many WBCs.  Many Gram positive cocci.       Significant Indicator POS (POS)      Source WND      Site Right Neck Abscess      Culture Result Wound -- (A)      Result:        Heavy growth Mixed skin will predominantly Viridans  Streptococcus.       Culture Result Wound -- (A)      Result:        Beta Streptococcus Group F  Heavy growth       Culture Result Wound -- (A)      Result:        Haemophilus parainfluenzae (Beta-lactamase negative)  Moderate growth      GRAM STAIN [[id number]] Collected:  03/19/17 2340    Order Status:  Completed Specimen Information:  Wound Updated:  03/20/17 0859     Significant Indicator .      Source WND      Site Right Neck Abscess      Gram Stain Result --      Result:        Many WBCs.  Many Gram positive cocci.            Plan:   Wound culture grew Viridians Strep, GFS, and  Haemophilus parainfluenzae.  Attempted to contact patient to discuss results and have him stop taking Bactrim and to complete cefdinir therapy. Left discrete voicemail requesting call back.    Dionicio Mcclellan, PharmD    Addendum 3/23: Patient called back and discussed results and change in antibiotics as above. He reports that wound is healing well and that it hurts less; denies fevers. He reports that he is taking antibiotics as prescribed and denies SE.    Dionicio Mcclellan, PharmD   patient

## 2018-10-11 NOTE — H&P ADULT - NSHPLABSRESULTS_GEN_ALL_CORE
15.7   17.50 )-----------( 325      ( 10 Oct 2018 19:00 )             46.3     10-10    138  |  95<L>  |  17  ----------------------------<  344<H>  4.2   |  20<L>  |  0.82    Ca    10.4      10 Oct 2018 19:00  Mg     2.3     10-10    TPro  8.8<H>  /  Alb  5.1<H>  /  TBili  0.5  /  DBili  x   /  AST  17  /  ALT  36  /  AlkPhos  58  10-10    Head CT: Age-indeterminate lacunar infarcts involve the right thalamus and right lentiform nucleus. Chronic lacunar infarcts and mild chronic white matter changes are noted as described. If the patient has a new and persistent  neurologic deficit, consider brain MRI imaging follow-up.    CTA NECK: Mild to moderate stenosis involves origin of the right internal carotid artery. Minimal stenosis involves origin of the left internal carotid artery. No evidence for vertebral artery stenosis.    CTA head: No evidence for significant intracranial stenosis or major vessel occlusion.    EKG: NSR at a rate of 83 with TWI in leads AVF and V5-V6 and QTc of 425    Prelim CXR: Linear opacities in the left lower lung which likely represent subsegmental atelectasis. 15.7   17.50 )-----------( 325      ( 10 Oct 2018 19:00 )             46.3     10-10    138  |  95<L>  |  17  ----------------------------<  344<H>  4.2   |  20<L>  |  0.82    Ca    10.4      10 Oct 2018 19:00  Mg     2.3     10-10    TPro  8.8<H>  /  Alb  5.1<H>  /  TBili  0.5  /  DBili  x   /  AST  17  /  ALT  36  /  AlkPhos  58  10-10    Head CT: Age-indeterminate lacunar infarcts involve the right thalamus and right lentiform nucleus. Chronic lacunar infarcts and mild chronic white matter changes are noted as described. If the patient has a new and persistent  neurologic deficit, consider brain MRI imaging follow-up.    CTA NECK: Mild to moderate stenosis involves origin of the right internal carotid artery. Minimal stenosis involves origin of the left internal carotid artery. No evidence for vertebral artery stenosis.    CTA head: No evidence for significant intracranial stenosis or major vessel occlusion.    EKG personally reviewed, NSR at a rate of 83 with TWI in leads AVF and V5-V6 and QTc of 425    Prelim CXR: Linear opacities in the left lower lung which likely represent subsegmental atelectasis.

## 2018-10-11 NOTE — H&P ADULT - RS GEN PE MLT RESP DETAILS PC
normal/no rales/breath sounds equal/no chest wall tenderness/no rhonchi/airway patent/respirations non-labored/good air movement/no wheezes/clear to auscultation bilaterally/no intercostal retractions

## 2018-10-11 NOTE — H&P ADULT - NEGATIVE ENMT SYMPTOMS
no hearing difficulty/no nasal congestion/no ear pain/no nasal discharge/no tinnitus/no sinus symptoms

## 2018-10-11 NOTE — OCCUPATIONAL THERAPY INITIAL EVALUATION ADULT - LEVEL OF INDEPENDENCE: SIT/STAND, REHAB EVAL
"Chief Complaint   Patient presents with   • Follow-up     Shortness of breath        History of Present Illness  63 y.o.  gentleman, accompanied by his wife, presents for sinus infection and SOB follow-up.  Notes resolution of SOB/RIDDLE.  Still has some sinus congestion but just started nasacort this week.  Also has some dizziness that comes and goes but overall improved.  Noes minimal cough, no fevers, no n/v, no headaches.  No new sxs.    Reports visit with Dr. Trevino, who has instructed 6 weeks of PT before proceeding with MRI.    Review of Systems  ROS (+) for nasal congestion, dizziness, but resolution of SOB.  All other ROS reviewed and negative.    PMSFH  The following portions of the patient's history were reviewed and updated as appropriate: allergies, current medications, past family history, past medical history, past social history, past surgical history and problem list.      Current Outpatient Prescriptions:   •  Triamcinolone Acetonide (NASACORT AQ NA), into each nostril., Disp: , Rfl:   •  aspirin 81 MG tablet, Take 1 tablet by mouth daily., Disp: , Rfl:   •  cefdinir (OMNICEF) 300 MG capsule, Take 1 capsule by mouth 2 (Two) Times a Day for 7 days., Disp: 14 capsule, Rfl: 0  •  Cholecalciferol (VITAMIN D) 2000 UNITS capsule, Take 1 capsule by mouth daily., Disp: , Rfl:   •  Cinnamon 500 MG tablet, Take 1 tablet by mouth daily., Disp: , Rfl:   •  fexofenadine (ALLEGRA) 180 MG tablet, Take 180 mg by mouth Daily., Disp: , Rfl:   •  ibuprofen (ADVIL,MOTRIN) 600 MG tablet, Take 1 tablet by mouth 2 (two) times a day as needed., Disp: , Rfl:   •  Multiple Vitamins-Minerals (CENTRUM SILVER ADULT 50+ PO), Take 1 tablet by mouth daily., Disp: , Rfl:   •  Omega-3 Krill Oil 1000 MG capsule, Take 1 tablet by mouth daily., Disp: , Rfl:     VITALS:  /76  Ht 69\" (175.3 cm)  Wt 172 lb (78 kg)  BMI 25.4 kg/m2    Physical Exam   Constitutional: He is oriented to person, place, and time. He appears " well-developed and well-nourished.   HENT:   Mouth/Throat: No oropharyngeal exudate.   Wears glasses, baseline strabismus; nasal mucosa pake and mildly swollen bilaterally   Eyes: Conjunctivae and EOM are normal.   Cardiovascular: Normal rate, regular rhythm and normal heart sounds.    Pulmonary/Chest: Effort normal and breath sounds normal. No respiratory distress. He has no wheezes. He has no rales.   Speaks in complete sentences   Abdominal: Soft. Bowel sounds are normal.   Lymphadenopathy:     He has no cervical adenopathy.   Neurological: He is alert and oriented to person, place, and time.   Psychiatric: He has a normal mood and affect. His behavior is normal.   Nursing note and vitals reviewed.      LABS  Results for orders placed or performed during the hospital encounter of 03/24/17   POC Creatinine   Result Value Ref Range    Creatinine 1.40 (H) 0.60 - 1.30 mg/dL    3/17 stable BMP, CBC (WBC 11.24), bord D-dimer  3/17 CT angio chest neg for PE and LAD; significant for degen changes in spine, granuloma, sm pericardial effusion    ASSESSMENT/PLAN  Problem List Items Addressed This Visit     Shortness of breath     Neg for PE; resolved; attributed to bronchospasm with URI         Allergic rhinitis     Cont allegra and nasacort - reviewed how to use correctly           Other Visit Diagnoses     Sinusitis    -  Primary    resolving; finish course of omnicef; cont allegra, nasacort, and nasal saline spray    Elevated serum creatinine        attributed to dehydration from illness; f/u BMP with next labs          FOLLOW-UP  1. Health maintenance - reminded patient to schedule colonosc with Dr. George (last colonosc 6/12)  2. RTC 7/28/17 as scheduled; A1C, lipids, BMP the week prior to appt    Electronically signed by:    Gina Adamson MD  03/30/2017       minimum assist (75% patients effort)

## 2018-10-11 NOTE — H&P ADULT - GASTROINTESTINAL DETAILS
no rebound tenderness/no guarding/no distention/no rigidity/bowel sounds normal/normal/soft/nontender

## 2018-10-11 NOTE — SWALLOW BEDSIDE ASSESSMENT ADULT - ORAL PREPARATORY PHASE
Within functional limits increased mastication time; delayed bolus collection Anterior loss of bolus

## 2018-10-11 NOTE — OCCUPATIONAL THERAPY INITIAL EVALUATION ADULT - DIAGNOSIS, OT EVAL
Left UE ataxia/weakness, nystagmus, truncal ataxia, decreased standing balance, decreased functional mobility, decreased ADL independence

## 2018-10-11 NOTE — CHART NOTE - NSCHARTNOTEFT_GEN_A_CORE
Pt seen and examined in ER. Pt aaox3, reports weakness on left side. no ha/vision change/n/v. NAD  hemodynamically stable  Acute CVA, permissive HTN for 24 hours, tele, follow up neuro rec  PT rec rehab   s/p s/s eval, rec appreciated

## 2018-10-11 NOTE — SWALLOW BEDSIDE ASSESSMENT ADULT - COMMENTS
Patient is a 53 year old male with PMHx of DM who presented with the complaint of left sided UE/LE numbness and weakness with dizziness and headache since this morning. Admitted for r/o CVA. CT Head showed "Age-indeterminate lacunar infarcts involve the right thalamus and right lentiform nucleus. Chronic lacunar infarcts and mild chronic white matter changes are noted as described. If the patient has a new and persistent  neurologic deficit, consider brain MRI imaging follow-up."    Patient was received awake, alert and cooperative in [facility]. Mildly reduced articulatory precision observed upon speech output. Recommendations discussed with primary RN in [facility].

## 2018-10-11 NOTE — H&P ADULT - PROBLEM SELECTOR PLAN 2
UA revealed elevated specific gravity and BUN/Cr reveal pre-renal picture. Patient s/p 2L of IVF in the ED  Will start IVF at 100cc/hr for 10 hours   Will trend lactate level post hydration

## 2018-10-11 NOTE — H&P ADULT - NSHPSOCIALHISTORY_GEN_ALL_CORE
, lives with wife and daughter,   Never smoked/or used any illicit drugs but drinks occasionally and last drink was on Saturday(about 5-10 shots of Liquor)

## 2018-10-11 NOTE — H&P ADULT - CRANIAL NERVE
II-XII intact, no slurring of speech or any facial asymmetry  Sustained right beating nystagmus on primary, right, and up gaze II-XII intact, no slurring of speech or any facial asymmetry  Nystagmus of left eye on left/right lateral gaze

## 2018-10-11 NOTE — H&P ADULT - NEGATIVE NEUROLOGICAL SYMPTOMS
no confusion/no loss of consciousness/no hemiparesis/no transient paralysis/no focal seizures/no loss of sensation/no difficulty walking/no generalized seizures/no tremors/no syncope

## 2018-10-11 NOTE — H&P ADULT - PROBLEM SELECTOR PLAN 3
WBC on admission noted to be 17.50. Patient not febrile on vital signs and denied any complaints. Likely stress demargination  Elevated Lactate could be 2/2 to Metformin use  Will check lactate level post hydration

## 2018-10-11 NOTE — OCCUPATIONAL THERAPY INITIAL EVALUATION ADULT - PLANNED THERAPY INTERVENTIONS, OT EVAL
strengthening/ROM/fine motor coordination training/transfer training/neuromuscular re-education/balance training/bed mobility training/ADL retraining

## 2018-10-12 LAB
BUN SERPL-MCNC: 11 MG/DL — SIGNIFICANT CHANGE UP (ref 7–23)
CALCIUM SERPL-MCNC: 8.9 MG/DL — SIGNIFICANT CHANGE UP (ref 8.4–10.5)
CHLORIDE SERPL-SCNC: 103 MMOL/L — SIGNIFICANT CHANGE UP (ref 98–107)
CO2 SERPL-SCNC: 21 MMOL/L — LOW (ref 22–31)
CREAT SERPL-MCNC: 0.61 MG/DL — SIGNIFICANT CHANGE UP (ref 0.5–1.3)
GLUCOSE SERPL-MCNC: 210 MG/DL — HIGH (ref 70–99)
HCT VFR BLD CALC: 42 % — SIGNIFICANT CHANGE UP (ref 39–50)
HGB BLD-MCNC: 14.2 G/DL — SIGNIFICANT CHANGE UP (ref 13–17)
MAGNESIUM SERPL-MCNC: 2.1 MG/DL — SIGNIFICANT CHANGE UP (ref 1.6–2.6)
MCHC RBC-ENTMCNC: 29 PG — SIGNIFICANT CHANGE UP (ref 27–34)
MCHC RBC-ENTMCNC: 33.8 % — SIGNIFICANT CHANGE UP (ref 32–36)
MCV RBC AUTO: 85.7 FL — SIGNIFICANT CHANGE UP (ref 80–100)
NRBC # FLD: 0 — SIGNIFICANT CHANGE UP
PHOSPHATE SERPL-MCNC: 2.4 MG/DL — LOW (ref 2.5–4.5)
PLATELET # BLD AUTO: 281 K/UL — SIGNIFICANT CHANGE UP (ref 150–400)
PMV BLD: 10.6 FL — SIGNIFICANT CHANGE UP (ref 7–13)
POTASSIUM SERPL-MCNC: 3.8 MMOL/L — SIGNIFICANT CHANGE UP (ref 3.5–5.3)
POTASSIUM SERPL-SCNC: 3.8 MMOL/L — SIGNIFICANT CHANGE UP (ref 3.5–5.3)
RBC # BLD: 4.9 M/UL — SIGNIFICANT CHANGE UP (ref 4.2–5.8)
RBC # FLD: 12.4 % — SIGNIFICANT CHANGE UP (ref 10.3–14.5)
SODIUM SERPL-SCNC: 137 MMOL/L — SIGNIFICANT CHANGE UP (ref 135–145)
WBC # BLD: 10.25 K/UL — SIGNIFICANT CHANGE UP (ref 3.8–10.5)
WBC # FLD AUTO: 10.25 K/UL — SIGNIFICANT CHANGE UP (ref 3.8–10.5)

## 2018-10-12 PROCEDURE — 99222 1ST HOSP IP/OBS MODERATE 55: CPT | Mod: GC

## 2018-10-12 PROCEDURE — 99233 SBSQ HOSP IP/OBS HIGH 50: CPT

## 2018-10-12 RX ORDER — CLOPIDOGREL BISULFATE 75 MG/1
300 TABLET, FILM COATED ORAL ONCE
Qty: 0 | Refills: 0 | Status: COMPLETED | OUTPATIENT
Start: 2018-10-12 | End: 2018-10-12

## 2018-10-12 RX ORDER — CLOPIDOGREL BISULFATE 75 MG/1
75 TABLET, FILM COATED ORAL DAILY
Qty: 0 | Refills: 0 | Status: DISCONTINUED | OUTPATIENT
Start: 2018-10-13 | End: 2018-10-18

## 2018-10-12 RX ADMIN — Medication 1: at 23:20

## 2018-10-12 RX ADMIN — Medication 81 MILLIGRAM(S): at 12:51

## 2018-10-12 RX ADMIN — Medication 2: at 17:58

## 2018-10-12 RX ADMIN — HEPARIN SODIUM 5000 UNIT(S): 5000 INJECTION INTRAVENOUS; SUBCUTANEOUS at 06:10

## 2018-10-12 RX ADMIN — Medication 1: at 12:51

## 2018-10-12 RX ADMIN — CLOPIDOGREL BISULFATE 300 MILLIGRAM(S): 75 TABLET, FILM COATED ORAL at 17:58

## 2018-10-12 RX ADMIN — ATORVASTATIN CALCIUM 80 MILLIGRAM(S): 80 TABLET, FILM COATED ORAL at 21:39

## 2018-10-12 RX ADMIN — Medication 1: at 09:03

## 2018-10-12 RX ADMIN — HEPARIN SODIUM 5000 UNIT(S): 5000 INJECTION INTRAVENOUS; SUBCUTANEOUS at 17:58

## 2018-10-12 NOTE — PROGRESS NOTE ADULT - ASSESSMENT
54 y/o M with PMH of DM presented with the complaint of left sided UE/LE numbness and weakness with dizziness and headache since this morning.

## 2018-10-12 NOTE — CONSULT NOTE ADULT - SUBJECTIVE AND OBJECTIVE BOX
Patient is a 53y old  Male who presents with a chief complaint of Left sided numbness and weakness with headache (11 Oct 2018 01:14)      HPI:  54 y/o right handed Lebanese Man with PMH of DM presented with the complaint of left sided UE/LE numbness and weakness with dizziness and headache since this morning. As per the patient he was in his usual state of health and was on his way to go to work this morning and then developed left sided upper and lower extremity weakness and numbness. At work patient developed frontal and parietal headache with associated dizziness, denied any photophobia or phonophobia. Patient stated that he took a baby aspirin when the headache started. Patient stated that when he walked he felt like he was about to fall due to the dizziness. Patient also stated that 2/2 to the dizziness he had 20 episodes of NBNB vomiting today prior to coming to the ER. Patient stated that when he was walking he felt like he was dragging his left foot. Patient denied any slurred speech, changes in vision, difficulty swallowing. Patient denied any CP, fevers, SOB, D/C, abdominal pain, dysuria, melena, hematochezia, recent travel, sick contact, pleuritic or positional chest pain.     On ED admission EKG revealed  NSR at a rate of 83 with TWI in leads AVF and V5-V6 and QTc of 425, CE x 1: Negative, WBC: 17.50, Gluc: 344, Protein: 88, Alb: 5.1, Lactate: 3.4. Head CT: Age-indeterminate lacunar infarcts involve the right thalamus and right lentiform nucleus. Chronic lacunar infarcts and mild chronic white matter changes are noted as described. If the patient has a new and persistent  neurologic deficit, consider brain MRI imaging follow-up. CTA NECK: Mild to moderate stenosis involves origin of the right internal carotid artery. Minimal stenosis involves origin of the left internal carotid artery. No evidence for vertebral artery stenosis. Patient was seen by neurology and their recommendation noted. Patient seen by SLP and put on modified diet. 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    REVIEW OF SYSTEMS  Constitutional: No fever, No weight loss, No fatigue  HEENT: No dysphagia,  No Headache,   Pulm: No cough, No SOB  Cardio: No chest pain, No palpitations  GI:  No abdominal pain, LBM __ ,  No incontinence  : No dysuria, No frequency, No retention, No incontinence  Neuro:  No loss of strength, No sensation defictis   Skin: No itching, No rash, No lesions   Endo: No Diabetic symptoms, No Thyroid symptoms  MSK: No joint pain, No joint swelling, No muscle pain   Psych:  No depression, No anxiety    PAST MEDICAL & SURGICAL HISTORY  Diabetes  No significant past surgical history      SOCIAL HISTORY  Smoking - Denied  EtOH - Denied   Drugs - Denied    FUNCTIONAL HISTORY  Lives in a ____ with spouse and children and has __ Stairs to Enter + Hand Rails and ___ Stairs inside + Hand Rails   Independent prior with Ambulation and ADLs.     CURRENT FUNCTIONAL STATUS  - Bed Mobility: min A   - Transfers:  MOD A   - Gait: MOD A 5 FEET   - ADLs: UE dress Min A and LE dressing MOD A     ALLERGIES  No Known Allergies        FAMILY HISTORY   No pertinent family history in first degree relatives      VITALS  T(C): 36.4 (10-12-18 @ 04:00), Max: 37.1 (10-12-18 @ 00:00)  HR: 64 (10-12-18 @ 04:00) (62 - 81)  BP: 146/90 (10-12-18 @ 04:00) (121/62 - 157/89)  RR: 18 (10-12-18 @ 04:00) (16 - 18)  SpO2: 99% (10-12-18 @ 04:00) (99% - 100%)  Wt(kg): --    RECENT LABS/IMAGING  CBC Full  -  ( 12 Oct 2018 07:02 )  WBC Count : 10.25 K/uL  Hemoglobin : 14.2 g/dL  Hematocrit : 42.0 %  Platelet Count - Automated : 281 K/uL  Mean Cell Volume : 85.7 fL  Mean Cell Hemoglobin : 29.0 pg  Mean Cell Hemoglobin Concentration : 33.8 %  Auto Neutrophil # : x  Auto Lymphocyte # : x  Auto Monocyte # : x  Auto Eosinophil # : x  Auto Basophil # : x  Auto Neutrophil % : x  Auto Lymphocyte % : x  Auto Monocyte % : x  Auto Eosinophil % : x  Auto Basophil % : x    10-12    137  |  103  |  11  ----------------------------<  210<H>  3.8   |  21<L>  |  0.61    Ca    8.9      12 Oct 2018 07:02  Phos  2.4     10-12  Mg     2.1     10-12    TPro  8.8<H>  /  Alb  5.1<H>  /  TBili  0.5  /  DBili  x   /  AST  17  /  ALT  36  /  AlkPhos  58  10-10    Urinalysis Basic - ( 10 Oct 2018 22:13 )    Color: LIGHT YELLOW / Appearance: CLEAR / SG: > 1.050 / pH: 6.0  Gluc: >1000 / Ketone: MODERATE  / Bili: NEGATIVE / Urobili: NORMAL   Blood: NEGATIVE / Protein: 30 / Nitrite: NEGATIVE   Leuk Esterase: NEGATIVE / RBC: 0-2 / WBC 0-2   Sq Epi: OCC / Non Sq Epi: x / Bacteria: NEGATIVE          MEDICATIONS   aspirin enteric coated 81 milliGRAM(s) Oral daily  atorvastatin 80 milliGRAM(s) Oral at bedtime  dextrose 40% Gel 15 Gram(s) Oral once PRN  dextrose 5%. 1000 milliLiter(s) IV Continuous <Continuous>  dextrose 50% Injectable 12.5 Gram(s) IV Push once  dextrose 50% Injectable 25 Gram(s) IV Push once  dextrose 50% Injectable 25 Gram(s) IV Push once  glucagon  Injectable 1 milliGRAM(s) IntraMuscular once PRN  heparin  Injectable 5000 Unit(s) SubCutaneous every 12 hours  influenza   Vaccine 0.5 milliLiter(s) IntraMuscular once  insulin lispro (HumaLOG) corrective regimen sliding scale   SubCutaneous three times a day before meals  insulin lispro (HumaLOG) corrective regimen sliding scale   SubCutaneous at bedtime  sodium chloride 0.9%. 1000 milliLiter(s) IV Continuous <Continuous>      ---------------------------------------------------------------------------------------- Patient is a 53y old  Male who presents with a chief complaint of Left sided numbness and weakness with headache (11 Oct 2018 01:14)      HPI:  54 y/o right handed Citizen of [place] Man with PMH of DM presented with the complaint of left sided UE/LE numbness and weakness with dizziness and headache .  As per the patient he was in his usual state of health and was on his way to go to work this morning and then developed left sided upper and lower extremity weakness and numbness. At work patient developed frontal and parietal headache with associated dizziness, denied any photophobia or phonophobia. Patient stated that he took a baby aspirin when the headache started. Patient stated that when he walked he felt like he was about to fall due to the dizziness. Patient also stated that 2/2 to the dizziness he had 20 episodes of NBNB vomiting today prior to coming to the ER. Patient stated that when he was walking he felt like he was dragging his left foot. Patient denied any slurred speech, changes in vision, difficulty swallowing. Patient denied any CP, fevers, SOB, D/C, abdominal pain, dysuria, melena, hematochezia, recent travel, sick contact, pleuritic or positional chest pain.     On ED admission EKG revealed  NSR at a rate of 83 with TWI in leads AVF and V5-V6 and QTc of 425, CE x 1: Negative, WBC: 17.50, Gluc: 344, Protein: 88, Alb: 5.1, Lactate: 3.4. Head CT: Age-indeterminate lacunar infarcts involve the right thalamus and right lentiform nucleus. Chronic lacunar infarcts and mild chronic white matter changes are noted as described. If the patient has a new and persistent  neurologic deficit, consider brain MRI imaging follow-up. CTA NECK: Mild to moderate stenosis involves origin of the right internal carotid artery. Minimal stenosis involves origin of the left internal carotid artery. No evidence for vertebral artery stenosis. Patient was seen by neurology and their recommendation noted. Patient seen by SLP and put on modified diet. 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    REVIEW OF SYSTEMS  Constitutional: No fever, No weight loss, No fatigue  HEENT: + mild dysphagia,  +/-Headache,   Pulm: No cough, No SOB  Cardio: No chest pain, No palpitations  GI:  No abdominal pain, LBM few days ago,  No incontinence  : No dysuria, No frequency, No retention, No incontinence  Neuro:  +loss of strength, No sensation defictis   Skin: No itching, No rash, No lesions   Endo: No Diabetic symptoms, No Thyroid symptoms  MSK: No joint pain, No joint swelling, No muscle pain   Psych:  No depression, No anxiety    PAST MEDICAL & SURGICAL HISTORY  Diabetes  No significant past surgical history      SOCIAL HISTORY  Smoking - Denied  EtOH - Denied   Drugs - Denied    FUNCTIONAL HISTORY  Lives in a house 1st floor but is  from spouse and children live on 2nd floor and has 3 Stairs to Enter  Independent prior with Ambulation and ADLs.     CURRENT FUNCTIONAL STATUS  - Bed Mobility: Min A   - Transfers:  Mod A  - Gait: MOD A 5 FEET   - ADLs: UE dress Min A and LE dressing MOD A     ALLERGIES  No Known Allergies        FAMILY HISTORY   No pertinent family history in first degree relatives      VITALS  T(C): 36.4 (10-12-18 @ 04:00), Max: 37.1 (10-12-18 @ 00:00)  HR: 64 (10-12-18 @ 04:00) (62 - 81)  BP: 146/90 (10-12-18 @ 04:00) (121/62 - 157/89)  RR: 18 (10-12-18 @ 04:00) (16 - 18)  SpO2: 99% (10-12-18 @ 04:00) (99% - 100%)  Wt(kg): --    RECENT LABS/IMAGING  CBC Full  -  ( 12 Oct 2018 07:02 )  WBC Count : 10.25 K/uL  Hemoglobin : 14.2 g/dL  Hematocrit : 42.0 %  Platelet Count - Automated : 281 K/uL  Mean Cell Volume : 85.7 fL  Mean Cell Hemoglobin : 29.0 pg  Mean Cell Hemoglobin Concentration : 33.8 %  Auto Neutrophil # : x  Auto Lymphocyte # : x  Auto Monocyte # : x  Auto Eosinophil # : x  Auto Basophil # : x  Auto Neutrophil % : x  Auto Lymphocyte % : x  Auto Monocyte % : x  Auto Eosinophil % : x  Auto Basophil % : x    10-12    137  |  103  |  11  ----------------------------<  210<H>  3.8   |  21<L>  |  0.61    Ca    8.9      12 Oct 2018 07:02  Phos  2.4     10-12  Mg     2.1     10-12    TPro  8.8<H>  /  Alb  5.1<H>  /  TBili  0.5  /  DBili  x   /  AST  17  /  ALT  36  /  AlkPhos  58  10-10    Urinalysis Basic - ( 10 Oct 2018 22:13 )    Color: LIGHT YELLOW / Appearance: CLEAR / SG: > 1.050 / pH: 6.0  Gluc: >1000 / Ketone: MODERATE  / Bili: NEGATIVE / Urobili: NORMAL   Blood: NEGATIVE / Protein: 30 / Nitrite: NEGATIVE   Leuk Esterase: NEGATIVE / RBC: 0-2 / WBC 0-2   Sq Epi: OCC / Non Sq Epi: x / Bacteria: NEGATIVE          MEDICATIONS   aspirin enteric coated 81 milliGRAM(s) Oral daily  atorvastatin 80 milliGRAM(s) Oral at bedtime  dextrose 40% Gel 15 Gram(s) Oral once PRN  dextrose 5%. 1000 milliLiter(s) IV Continuous <Continuous>  dextrose 50% Injectable 12.5 Gram(s) IV Push once  dextrose 50% Injectable 25 Gram(s) IV Push once  dextrose 50% Injectable 25 Gram(s) IV Push once  glucagon  Injectable 1 milliGRAM(s) IntraMuscular once PRN  heparin  Injectable 5000 Unit(s) SubCutaneous every 12 hours  influenza   Vaccine 0.5 milliLiter(s) IntraMuscular once  insulin lispro (HumaLOG) corrective regimen sliding scale   SubCutaneous three times a day before meals  insulin lispro (HumaLOG) corrective regimen sliding scale   SubCutaneous at bedtime  sodium chloride 0.9%. 1000 milliLiter(s) IV Continuous <Continuous>      ----------------------------------------------------------------------------------------    PHYSICAL EXAM  Constitutional: NAD, Resting Comfortable  HEENT: NC/AT, Normal Conjunctivae, EOMI, PERRLA   Neck: Supple, FROM,   Chest: No Respiratory Distress  CV: RRR, No Peripheral Edema  Abdomen: ND  MSK: No joint swelling, Full ROM   Ext: No C/C/E, Pulses 2+ throughout, No calf tenderness   Neuro Exam      Cognitive: AAO x 3     Communication:  Fluent, No dysarthria      CN II - XII  intact         Coordination: FTN impaired on left     Motor             LEFT    UE:  SF 4+/5, EF 4+/5, EE 4+/5, WE 4+/5, Hand intrinsic 4+/5,  mild diminished             RIGHT UE:  SF 5/5, EF 5/5, EE 5/5, WE 5/5, Hand Intrinsic 5/5,  wnl             LEFT    LE:  HF 4/5, KE 5/5, DF 5/5, EHL 5/5,  PF 5/5             RIGHT LE:  HF 5/5, KE 5/5, DF 5/5, EHL 5/5, PF 5/5        Sensory: Intact to light touch     Tone: Normal     Reflexes: DTR Intact,  Negative Hoffmans, Negative Babinski      Gait & Balance: WNL   Psychiatric: Affect WNL  Functional:     ASSESSMENT/PLAN  53 year-old Man with a PMH of DM found to have 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      Recommend   - Disposition:  ACUTE inpatient rehabilitation for the functional deficits consisting of 3 hours of therapy/day & 24 hour RN/daily PMR physician for comorbid medical management. Patient can tolerate intensive therapy consisting of PT/OT and or SLP.  Will continue to follow for ongoing rehab needs and recommendations.  - c/w PT for Bed Mobility, Transfers, Ambulation with AD   - c/w OT for ADLs  - Turn Q2hrs while in bed, OOB to chair when possible to prevent Pressure Injury   - c/w modified diet for dysphagia

## 2018-10-12 NOTE — PROGRESS NOTE ADULT - PROBLEM SELECTOR PLAN 4
A1C 10. Pt has h/o DM, was on metformin, but has not seen a physician for years and not on any med at this time   Endo consulted

## 2018-10-12 NOTE — PROGRESS NOTE ADULT - PROBLEM SELECTOR PLAN 1
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

## 2018-10-12 NOTE — PROGRESS NOTE ADULT - SUBJECTIVE AND OBJECTIVE BOX
Patient is a 53y old  Male who presents with a chief complaint of Left sided numbness and weakness with headache (11 Oct 2018 01:14)      SUBJECTIVE / OVERNIGHT EVENTS:  Pt reports weakness in left side improved. Feels unsteady when he got up. no ha/vision change/slurred speech     MEDICATIONS  (STANDING):  aspirin enteric coated 81 milliGRAM(s) Oral daily  atorvastatin 80 milliGRAM(s) Oral at bedtime  dextrose 5%. 1000 milliLiter(s) (50 mL/Hr) IV Continuous <Continuous>  dextrose 50% Injectable 12.5 Gram(s) IV Push once  dextrose 50% Injectable 25 Gram(s) IV Push once  dextrose 50% Injectable 25 Gram(s) IV Push once  heparin  Injectable 5000 Unit(s) SubCutaneous every 12 hours  influenza   Vaccine 0.5 milliLiter(s) IntraMuscular once  insulin lispro (HumaLOG) corrective regimen sliding scale   SubCutaneous three times a day before meals  insulin lispro (HumaLOG) corrective regimen sliding scale   SubCutaneous at bedtime  sodium chloride 0.9%. 1000 milliLiter(s) (100 mL/Hr) IV Continuous <Continuous>    MEDICATIONS  (PRN):  dextrose 40% Gel 15 Gram(s) Oral once PRN Blood Glucose LESS THAN 70 milliGRAM(s)/deciliter  glucagon  Injectable 1 milliGRAM(s) IntraMuscular once PRN Glucose LESS THAN 70 milligrams/deciliter      T(C): 36.4 (10-12-18 @ 04:00), Max: 37.1 (10-12-18 @ 00:00)  HR: 64 (10-12-18 @ 04:00) (62 - 81)  BP: 146/90 (10-12-18 @ 04:00) (146/90 - 157/89)  RR: 18 (10-12-18 @ 04:00) (16 - 18)  SpO2: 99% (10-12-18 @ 04:00) (99% - 100%)  CAPILLARY BLOOD GLUCOSE      POCT Blood Glucose.: 200 mg/dL (12 Oct 2018 12:48)  POCT Blood Glucose.: 197 mg/dL (12 Oct 2018 08:55)  POCT Blood Glucose.: 289 mg/dL (11 Oct 2018 21:21)  POCT Blood Glucose.: 224 mg/dL (11 Oct 2018 17:09)    I&O's Summary      PHYSICAL EXAM:  GENERAL: NAD, well-developed  HEAD:  Atraumatic, Normocephalic  EYES: EOMI, PERRLA, conjunctiva and sclera clear  NECK: Supple, No JVD  CHEST/LUNG: Clear to auscultation bilaterally; No wheeze  HEART: s1 s2, regular rhythm and rate   ABDOMEN: Soft, Nontender, Nondistended; Bowel sounds present  EXTREMITIES:  2+ Peripheral Pulses, No clubbing, cyanosis, or edema  PSYCH: AAOx3, calm   NEUROLOGY: RUE/RLE 5/5, LUE/LLE 5/5  SKIN: No rashes or lesions    LABS:                        14.2   10.25 )-----------( 281      ( 12 Oct 2018 07:02 )             42.0     10-12    137  |  103  |  11  ----------------------------<  210<H>  3.8   |  21<L>  |  0.61    Ca    8.9      12 Oct 2018 07:02  Phos  2.4     10-12  Mg     2.1     10-12    TPro  8.8<H>  /  Alb  5.1<H>  /  TBili  0.5  /  DBili  x   /  AST  17  /  ALT  36  /  AlkPhos  58  10-10    PT/INR - ( 10 Oct 2018 19:00 )   PT: 11.7 SEC;   INR: 1.02          PTT - ( 10 Oct 2018 19:00 )  PTT:26.3 SEC  CARDIAC MARKERS ( 11 Oct 2018 13:51 )  x     / x     / 183 u/L / 4.76 ng/mL / x          Urinalysis Basic - ( 10 Oct 2018 22:13 )    Color: LIGHT YELLOW / Appearance: CLEAR / SG: > 1.050 / pH: 6.0  Gluc: >1000 / Ketone: MODERATE  / Bili: NEGATIVE / Urobili: NORMAL   Blood: NEGATIVE / Protein: 30 / Nitrite: NEGATIVE   Leuk Esterase: NEGATIVE / RBC: 0-2 / WBC 0-2   Sq Epi: OCC / Non Sq Epi: x / Bacteria: NEGATIVE        RADIOLOGY & ADDITIONAL TESTS:    Imaging Personally Reviewed: < from: MR Head No Cont (10.11.18 @ 08:50) >  IMPRESSION:    An 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    < end of copied text >      Consultant(s) Notes Reviewed:      Care Discussed with Consultants/Other Providers: Dr. Libman regarding MRI findings

## 2018-10-13 LAB
BUN SERPL-MCNC: 12 MG/DL — SIGNIFICANT CHANGE UP (ref 7–23)
CALCIUM SERPL-MCNC: 8.9 MG/DL — SIGNIFICANT CHANGE UP (ref 8.4–10.5)
CHLORIDE SERPL-SCNC: 101 MMOL/L — SIGNIFICANT CHANGE UP (ref 98–107)
CO2 SERPL-SCNC: 20 MMOL/L — LOW (ref 22–31)
COPPER SERPL-MCNC: 112 UG/DL — SIGNIFICANT CHANGE UP (ref 72–166)
CREAT SERPL-MCNC: 0.6 MG/DL — SIGNIFICANT CHANGE UP (ref 0.5–1.3)
GLUCOSE SERPL-MCNC: 224 MG/DL — HIGH (ref 70–99)
HCT VFR BLD CALC: 43.2 % — SIGNIFICANT CHANGE UP (ref 39–50)
HGB BLD-MCNC: 14.3 G/DL — SIGNIFICANT CHANGE UP (ref 13–17)
MAGNESIUM SERPL-MCNC: 2.1 MG/DL — SIGNIFICANT CHANGE UP (ref 1.6–2.6)
MCHC RBC-ENTMCNC: 27.9 PG — SIGNIFICANT CHANGE UP (ref 27–34)
MCHC RBC-ENTMCNC: 33.1 % — SIGNIFICANT CHANGE UP (ref 32–36)
MCV RBC AUTO: 84.4 FL — SIGNIFICANT CHANGE UP (ref 80–100)
NRBC # FLD: 0 — SIGNIFICANT CHANGE UP
PLATELET # BLD AUTO: 280 K/UL — SIGNIFICANT CHANGE UP (ref 150–400)
PMV BLD: 10.4 FL — SIGNIFICANT CHANGE UP (ref 7–13)
POTASSIUM SERPL-MCNC: 4.2 MMOL/L — SIGNIFICANT CHANGE UP (ref 3.5–5.3)
POTASSIUM SERPL-SCNC: 4.2 MMOL/L — SIGNIFICANT CHANGE UP (ref 3.5–5.3)
RBC # BLD: 5.12 M/UL — SIGNIFICANT CHANGE UP (ref 4.2–5.8)
RBC # FLD: 12.1 % — SIGNIFICANT CHANGE UP (ref 10.3–14.5)
SODIUM SERPL-SCNC: 134 MMOL/L — LOW (ref 135–145)
WBC # BLD: 10.19 K/UL — SIGNIFICANT CHANGE UP (ref 3.8–10.5)
WBC # FLD AUTO: 10.19 K/UL — SIGNIFICANT CHANGE UP (ref 3.8–10.5)

## 2018-10-13 PROCEDURE — 99233 SBSQ HOSP IP/OBS HIGH 50: CPT

## 2018-10-13 PROCEDURE — 93306 TTE W/DOPPLER COMPLETE: CPT | Mod: 26

## 2018-10-13 RX ORDER — INSULIN GLARGINE 100 [IU]/ML
10 INJECTION, SOLUTION SUBCUTANEOUS AT BEDTIME
Qty: 0 | Refills: 0 | Status: DISCONTINUED | OUTPATIENT
Start: 2018-10-13 | End: 2018-10-14

## 2018-10-13 RX ORDER — INSULIN LISPRO 100/ML
3 VIAL (ML) SUBCUTANEOUS
Qty: 0 | Refills: 0 | Status: DISCONTINUED | OUTPATIENT
Start: 2018-10-13 | End: 2018-10-14

## 2018-10-13 RX ADMIN — Medication 3 UNIT(S): at 17:39

## 2018-10-13 RX ADMIN — Medication 2: at 08:56

## 2018-10-13 RX ADMIN — CLOPIDOGREL BISULFATE 75 MILLIGRAM(S): 75 TABLET, FILM COATED ORAL at 13:26

## 2018-10-13 RX ADMIN — Medication 81 MILLIGRAM(S): at 13:26

## 2018-10-13 RX ADMIN — HEPARIN SODIUM 5000 UNIT(S): 5000 INJECTION INTRAVENOUS; SUBCUTANEOUS at 17:40

## 2018-10-13 RX ADMIN — Medication 2: at 17:39

## 2018-10-13 RX ADMIN — ATORVASTATIN CALCIUM 80 MILLIGRAM(S): 80 TABLET, FILM COATED ORAL at 21:38

## 2018-10-13 RX ADMIN — Medication 2: at 13:25

## 2018-10-13 RX ADMIN — HEPARIN SODIUM 5000 UNIT(S): 5000 INJECTION INTRAVENOUS; SUBCUTANEOUS at 05:40

## 2018-10-13 RX ADMIN — INSULIN GLARGINE 10 UNIT(S): 100 INJECTION, SOLUTION SUBCUTANEOUS at 21:51

## 2018-10-13 NOTE — PROGRESS NOTE ADULT - ASSESSMENT
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

## 2018-10-13 NOTE — PROGRESS NOTE ADULT - PROBLEM SELECTOR PLAN 4
A1C 10. Pt has h/o DM, was on metformin, but has not seen a physician for years and not on any med at this time   Endo consulted, await rec  start lantus 10 with humalog 3 qAC  monitor FS

## 2018-10-13 NOTE — PROGRESS NOTE ADULT - SUBJECTIVE AND OBJECTIVE BOX
Neurology Progress    BEKA [id number]    HPI:  52 y/o right handed [place] M with PMH of DM presented with the complaint of left sided UE/LE numbness and weakness with dizziness and headache since this morning. As per the patient he was in his usual state of health and was on his way to go to work this morning and then developed left sided upper and lower extremity weakness and numbness. At work patient developed frontal and parietal headache with associated dizziness, denied any photophobia or phonophobia. Patient stated that he took a baby aspirin when the headache started. Patient stated that when he walked he felt like he was about to fall due to the dizziness. Patient also stated that 2/2 to the dizziness he had 20 episodes of NBNB vomiting today prior to coming to the ER. Patient stated that when he was walking he felt like he was dragging his left foot. Patient denied any slurred speech, changes in vision, difficulty swallowing. Patient denied any CP, fevers, SOB, D/C, abdominal pain, dysuria, melena, hematochezia, recent travel, sick contact, pleuritic or positional chest pain.     On ED admission EKG revealed  NSR at a rate of 83 with TWI in leads AVF and V5-V6 and QTc of 425, CE x 1: Negative, WBC: 17.50, Gluc: 344, Protein: 88, Alb: 5.1, Lactate: 3.4. Head CT: Age-indeterminate lacunar infarcts involve the right thalamus and right lentiform nucleus. Chronic lacunar infarcts and mild chronic white matter changes are noted as described. If the patient has a new and persistent  neurologic deficit, consider brain MRI imaging follow-up. CTA NECK: Mild to moderate stenosis involves origin of the right internal carotid artery. Minimal stenosis involves origin of the left internal carotid artery. No evidence for vertebral artery stenosis. Patient was seen by neurology and their recommendation noted.        No family history pertinent to patient’s current condition/encounter (11 Oct 2018 01:14)      Past Medical History  Diabetes      Past Surgical History  No significant past surgical history      MEDICATIONS    aspirin enteric coated 81 milliGRAM(s) Oral daily  atorvastatin 80 milliGRAM(s) Oral at bedtime  clopidogrel Tablet 75 milliGRAM(s) Oral daily  dextrose 40% Gel 15 Gram(s) Oral once PRN  dextrose 5%. 1000 milliLiter(s) IV Continuous <Continuous>  dextrose 50% Injectable 12.5 Gram(s) IV Push once  dextrose 50% Injectable 25 Gram(s) IV Push once  dextrose 50% Injectable 25 Gram(s) IV Push once  glucagon  Injectable 1 milliGRAM(s) IntraMuscular once PRN  heparin  Injectable 5000 Unit(s) SubCutaneous every 12 hours  influenza   Vaccine 0.5 milliLiter(s) IntraMuscular once  insulin lispro (HumaLOG) corrective regimen sliding scale   SubCutaneous three times a day before meals  insulin lispro (HumaLOG) corrective regimen sliding scale   SubCutaneous at bedtime         Family history: No history of dementia, strokes, or seizures   FAMILY HISTORY:  No pertinent family history in first degree relatives    SOCIAL HISTORY -- No history of tobacco or alcohol use     Allergies    No Known Allergies    Intolerances            Vital Signs Last 24 Hrs  T(C): 36.7 (13 Oct 2018 05:38), Max: 36.7 (12 Oct 2018 08:08)  T(F): 98.1 (13 Oct 2018 05:38), Max: 98.1 (13 Oct 2018 05:38)  HR: 68 (13 Oct 2018 05:38) (62 - 84)  BP: 148/81 (13 Oct 2018 05:38) (141/86 - 169/108)  BP(mean): --  RR: 17 (13 Oct 2018 05:38) (17 - 18)  SpO2: 98% (13 Oct 2018 05:38) (98% - 98%)        On Neurological Examination:    Mental Status - Patient is alert, awake, oriented X3. .   Follows commands well and able to answer questions appropriately. Mood and affect  normal  Follow simple commands able to repeat  able to name.  Speech - Fluent no Dysarthria  no  Aphasia                              Cranial Nerves - Extraocular muscle intact  GIORGIO Facial symmetry Tongue midline, CnV1to V3 intact gross hearing intact      Motor Exam -   Right upper  5/5 throughout  Left upper 5/5 throughtout  Right lower- 5/5 throughout  Left lower -5/5 throughout  Coordination -finger to nose intact  Muscle tone - is normal all over. No asymmetry is seen.      Sensory   left hemisensory    GENERAL Exam:     Nontoxic , No Acute Distress   	  HEENT:  normocephalic, atraumatic  		  LUNGS:	Clear bilaterally  No Wheeze      VASCULAR: no carotid brui  	  HEART:	 Normal S1S2   No murmur RRR        	  MUSCULOSKELETAL: Normal Range of Motion  	   SKIN:      Normal   No Ecchymosis               LABS:  CBC Full  -  ( 13 Oct 2018 06:55 )  WBC Count : 10.19 K/uL  Hemoglobin : 14.3 g/dL  Hematocrit : 43.2 %  Platelet Count - Automated : 280 K/uL  Mean Cell Volume : 84.4 fL  Mean Cell Hemoglobin : 27.9 pg  Mean Cell Hemoglobin Concentration : 33.1 %  Auto Neutrophil # : x  Auto Lymphocyte # : x  Auto Monocyte # : x  Auto Eosinophil # : x  Auto Basophil # : x  Auto Neutrophil % : x  Auto Lymphocyte % : x  Auto Monocyte % : x  Auto Eosinophil % : x  Auto Basophil % : x      10-13    134<L>  |  101  |  12  ----------------------------<  224<H>  4.2   |  20<L>  |  0.60    Ca    8.9      13 Oct 2018 06:55  Phos  2.4     10-12  Mg     2.1     10-13      Hemoglobin A1C:       Vitamin B12         RADIOLOGY    EKG schoenberg

## 2018-10-13 NOTE — PROGRESS NOTE ADULT - SUBJECTIVE AND OBJECTIVE BOX
Patient is a 53y old  Male who presents with a chief complaint of Left sided numbness and weakness with headache (13 Oct 2018 07:58)      SUBJECTIVE / OVERNIGHT EVENTS:  Pt feels well, reports left sided still feel some numbness, but improving     MEDICATIONS  (STANDING):  aspirin enteric coated 81 milliGRAM(s) Oral daily  atorvastatin 80 milliGRAM(s) Oral at bedtime  clopidogrel Tablet 75 milliGRAM(s) Oral daily  dextrose 5%. 1000 milliLiter(s) (50 mL/Hr) IV Continuous <Continuous>  dextrose 50% Injectable 12.5 Gram(s) IV Push once  dextrose 50% Injectable 25 Gram(s) IV Push once  dextrose 50% Injectable 25 Gram(s) IV Push once  heparin  Injectable 5000 Unit(s) SubCutaneous every 12 hours  influenza   Vaccine 0.5 milliLiter(s) IntraMuscular once  insulin glargine Injectable (LANTUS) 10 Unit(s) SubCutaneous at bedtime  insulin lispro (HumaLOG) corrective regimen sliding scale   SubCutaneous three times a day before meals  insulin lispro (HumaLOG) corrective regimen sliding scale   SubCutaneous at bedtime  insulin lispro Injectable (HumaLOG) 3 Unit(s) SubCutaneous three times a day before meals    MEDICATIONS  (PRN):  dextrose 40% Gel 15 Gram(s) Oral once PRN Blood Glucose LESS THAN 70 milliGRAM(s)/deciliter  glucagon  Injectable 1 milliGRAM(s) IntraMuscular once PRN Glucose LESS THAN 70 milligrams/deciliter      T(C): 36.5 (10-13-18 @ 13:19), Max: 36.7 (10-12-18 @ 15:08)  HR: 74 (10-13-18 @ 13:19) (68 - 84)  BP: 145/94 (10-13-18 @ 13:19) (131/91 - 169/108)  RR: 18 (10-13-18 @ 13:19) (16 - 18)  SpO2: 99% (10-13-18 @ 13:19) (97% - 99%)  CAPILLARY BLOOD GLUCOSE      POCT Blood Glucose.: 233 mg/dL (13 Oct 2018 13:13)  POCT Blood Glucose.: 232 mg/dL (13 Oct 2018 08:44)  POCT Blood Glucose.: 269 mg/dL (12 Oct 2018 23:17)  POCT Blood Glucose.: 242 mg/dL (12 Oct 2018 17:40)    I&O's Summary      PHYSICAL EXAM:  GENERAL: NAD, well-developed  HEAD:  Atraumatic, Normocephalic  EYES: EOMI, PERRLA, conjunctiva and sclera clear  NECK: Supple, No JVD  CHEST/LUNG: Clear to auscultation bilaterally; No wheeze  HEART: s1 s2, regular rhythm and rate   ABDOMEN: Soft, Nontender, Nondistended; Bowel sounds present  EXTREMITIES:  2+ Peripheral Pulses, No clubbing, cyanosis, or edema  PSYCH: AAOx3, calm   NEUROLOGY: left side is weaker, however, LUE/LLE 5/5  SKIN: No rashes or lesions    LABS:                        14.3   10.19 )-----------( 280      ( 13 Oct 2018 06:55 )             43.2     10-13    134<L>  |  101  |  12  ----------------------------<  224<H>  4.2   |  20<L>  |  0.60    Ca    8.9      13 Oct 2018 06:55  Phos  2.4     10-12  Mg     2.1     10-13                RADIOLOGY & ADDITIONAL TESTS:    Imaging Personally Reviewed: < from: Transthoracic Echocardiogram (10.13.18 @ 11:47) >  CONCLUSIONS:  1. Normal left ventricular internal dimensions and wall  thicknesses.  2. Normal left ventricular systolic function. No segmental  wall motion abnormalities.  3. Mild diastolic dysfunction (Stage I).  4. Normal right ventricular size and function.  5. Agitated saline injection demonstrates no evidence of a  patent foramen ovale.    < end of copied text >      Consultant(s) Notes Reviewed:      Care Discussed with Consultants/Other Providers:

## 2018-10-14 LAB
BUN SERPL-MCNC: 15 MG/DL — SIGNIFICANT CHANGE UP (ref 7–23)
CALCIUM SERPL-MCNC: 8.8 MG/DL — SIGNIFICANT CHANGE UP (ref 8.4–10.5)
CHLORIDE SERPL-SCNC: 99 MMOL/L — SIGNIFICANT CHANGE UP (ref 98–107)
CO2 SERPL-SCNC: 21 MMOL/L — LOW (ref 22–31)
CREAT SERPL-MCNC: 0.67 MG/DL — SIGNIFICANT CHANGE UP (ref 0.5–1.3)
GLUCOSE SERPL-MCNC: 245 MG/DL — HIGH (ref 70–99)
HCT VFR BLD CALC: 47.3 % — SIGNIFICANT CHANGE UP (ref 39–50)
HGB BLD-MCNC: 15.7 G/DL — SIGNIFICANT CHANGE UP (ref 13–17)
MAGNESIUM SERPL-MCNC: 2.1 MG/DL — SIGNIFICANT CHANGE UP (ref 1.6–2.6)
MCHC RBC-ENTMCNC: 28.3 PG — SIGNIFICANT CHANGE UP (ref 27–34)
MCHC RBC-ENTMCNC: 33.2 % — SIGNIFICANT CHANGE UP (ref 32–36)
MCV RBC AUTO: 85.4 FL — SIGNIFICANT CHANGE UP (ref 80–100)
NRBC # FLD: 0 — SIGNIFICANT CHANGE UP
PLATELET # BLD AUTO: 320 K/UL — SIGNIFICANT CHANGE UP (ref 150–400)
PMV BLD: 10.3 FL — SIGNIFICANT CHANGE UP (ref 7–13)
POTASSIUM SERPL-MCNC: 3.8 MMOL/L — SIGNIFICANT CHANGE UP (ref 3.5–5.3)
POTASSIUM SERPL-SCNC: 3.8 MMOL/L — SIGNIFICANT CHANGE UP (ref 3.5–5.3)
RBC # BLD: 5.54 M/UL — SIGNIFICANT CHANGE UP (ref 4.2–5.8)
RBC # FLD: 12.1 % — SIGNIFICANT CHANGE UP (ref 10.3–14.5)
SODIUM SERPL-SCNC: 136 MMOL/L — SIGNIFICANT CHANGE UP (ref 135–145)
WBC # BLD: 11.99 K/UL — HIGH (ref 3.8–10.5)
WBC # FLD AUTO: 11.99 K/UL — HIGH (ref 3.8–10.5)

## 2018-10-14 PROCEDURE — 99254 IP/OBS CNSLTJ NEW/EST MOD 60: CPT

## 2018-10-14 PROCEDURE — 99232 SBSQ HOSP IP/OBS MODERATE 35: CPT

## 2018-10-14 RX ORDER — INSULIN LISPRO 100/ML
5 VIAL (ML) SUBCUTANEOUS
Qty: 0 | Refills: 0 | Status: DISCONTINUED | OUTPATIENT
Start: 2018-10-14 | End: 2018-10-15

## 2018-10-14 RX ORDER — INSULIN LISPRO 100/ML
6 VIAL (ML) SUBCUTANEOUS
Qty: 0 | Refills: 0 | Status: DISCONTINUED | OUTPATIENT
Start: 2018-10-14 | End: 2018-10-14

## 2018-10-14 RX ORDER — INSULIN GLARGINE 100 [IU]/ML
15 INJECTION, SOLUTION SUBCUTANEOUS AT BEDTIME
Qty: 0 | Refills: 0 | Status: DISCONTINUED | OUTPATIENT
Start: 2018-10-14 | End: 2018-10-15

## 2018-10-14 RX ADMIN — Medication 81 MILLIGRAM(S): at 11:05

## 2018-10-14 RX ADMIN — CLOPIDOGREL BISULFATE 75 MILLIGRAM(S): 75 TABLET, FILM COATED ORAL at 11:05

## 2018-10-14 RX ADMIN — ATORVASTATIN CALCIUM 80 MILLIGRAM(S): 80 TABLET, FILM COATED ORAL at 21:48

## 2018-10-14 RX ADMIN — Medication 2: at 12:55

## 2018-10-14 RX ADMIN — Medication 6 UNIT(S): at 12:55

## 2018-10-14 RX ADMIN — HEPARIN SODIUM 5000 UNIT(S): 5000 INJECTION INTRAVENOUS; SUBCUTANEOUS at 05:42

## 2018-10-14 RX ADMIN — INSULIN GLARGINE 15 UNIT(S): 100 INJECTION, SOLUTION SUBCUTANEOUS at 22:14

## 2018-10-14 RX ADMIN — Medication 2: at 09:34

## 2018-10-14 RX ADMIN — Medication 1: at 22:15

## 2018-10-14 RX ADMIN — Medication 3 UNIT(S): at 09:35

## 2018-10-14 RX ADMIN — HEPARIN SODIUM 5000 UNIT(S): 5000 INJECTION INTRAVENOUS; SUBCUTANEOUS at 18:54

## 2018-10-14 RX ADMIN — Medication 3: at 18:54

## 2018-10-14 NOTE — CONSULT NOTE ADULT - PROBLEM SELECTOR RECOMMENDATION 9
Recommend basal bolus insulin as an inpatient.     - Increase lantus to 15 units at bedtime   - Change humalog to 5 units before meals   - continue correctional insulin   Discussed with patient that he will require insulin upon discharge.  Consider discharge on lantus + orals agents vs basal-bolus insulin  Recommend RD consult to educate patient on carbohdyrate consistent diet.  Pt requires teaching by bedside RN on insulin self-administration

## 2018-10-14 NOTE — CONSULT NOTE ADULT - ATTENDING COMMENTS
53-year-old right-handed [place] citizen gentleman first evaluated [facility] on 10/11/18 with vertigo. For several months prior to admission, he had episodic left-sided numbness. On 10/10/18, he developed dizziness/vertigo, nausea and vomiting. He might have had similar but milder symptoms 2 weeks prior to admission. On 10/10/18, he developed vertigo as well as an intensification of left-sided numbness. He drinks "10 shots" of alcohol every 2 weeks or so. ROS otherwise negative. Exam. Alert and attentive;? Subtle right ptosis (difficult to assess pupillary size as his eyes were very dark); right beating nystagmus, increased on right gaze; left arm drift; mild left arm ataxia; mildly decreased temperature sensation on left leg; gait not tested; remainder of neurologic exam is nonfocal. CT head (10/10/18) to my eye showed multiple most likely chronic infarcts: Right thalamus, right lentiform nucleus, bilateral corona radiata (periventricular). CTA neck (10/10/18) showed mild-moderate right ICA stenosis and minimal left ICA stenosis. CTA head (10/10/18) was unremarkable. MRI brain (10/11/18) to my showed an acute small right-sided superior medullary infarct, somewhere between lateral medullary and medial medullary regions. Impression. For several months he has had episodic left-sided numbness, possibly attributable to a right thalamic lacunar infarct. On 10/10/18, he developed vertigo as well as an intensification of left-sided numbness, due to an acute right medullary infarct, most likely due to small vessel disease. The infarct is located between lateral medullary and medial medullary regions, probably accounting for his mild neurologic findings, some of which are consistent with either a lateral or medial medullary localization. Suggest. Elective TTE; telemetry; Plavix 600 mg loading dose, then 75 mg daily for 3 weeks; aspirin 81 mg daily, indefinitely; atorvastatin 80 mg daily-can be titrated based on LDL; PT/OT; PM&R consultation ; should be counseled to reduce alcohol intake to a maximum of one-to drinks per day (I did not provide counseling). .
Delilah Martinez MD  pager   Diabetes team: [phone] business hours  [phone] night/weekend

## 2018-10-14 NOTE — PROGRESS NOTE ADULT - PROBLEM SELECTOR PLAN 1
Acute infarct involves the right pontomedullary junction  Continue with aspirin 81mg and Lipitor 80mg QHS daily  s/p Load plavix 300 mg x 1, then 75 daily per neuro rec   c/w PT/OT  soft with nectar thickened liquid per Speech and swallow

## 2018-10-14 NOTE — CONSULT NOTE ADULT - SUBJECTIVE AND OBJECTIVE BOX
HPI:  53 year old man with T2DM, uncontrolled, admitted with CVA.   He has had T2DM for about 5 years, on metformin, has not been adherent with metformin.   He checks glucose values every now and then and they have been ~ 300 mg/dl.  He does not limit carbohydrate intake.  Does some exercise (walking).   He has seen an opthalmologist, reports no retinopathy, also no nephropathy or neuropathy.   He feels motivated now to increase adherence with diabetes self care.    He reports no hypertension or hyperlipidemia    PAST MEDICAL & SURGICAL HISTORY:    No significant past surgical history      FAMILY HISTORY:  Father (85) was recently diagnosed with Diabetes, mother  from cancer      Social History: he lives with his children.  Employed ([employer]).  No smoking.  From [facility], has lived in the  for 18 years    Outpatient Medications: Metformin    MEDICATIONS  (STANDING):  aspirin enteric coated 81 milliGRAM(s) Oral daily  atorvastatin 80 milliGRAM(s) Oral at bedtime  clopidogrel Tablet 75 milliGRAM(s) Oral daily  dextrose 5%. 1000 milliLiter(s) (50 mL/Hr) IV Continuous <Continuous>  dextrose 50% Injectable 12.5 Gram(s) IV Push once  dextrose 50% Injectable 25 Gram(s) IV Push once  dextrose 50% Injectable 25 Gram(s) IV Push once  heparin  Injectable 5000 Unit(s) SubCutaneous every 12 hours  influenza   Vaccine 0.5 milliLiter(s) IntraMuscular once  insulin glargine Injectable (LANTUS) 10 Unit(s) SubCutaneous at bedtime  insulin lispro (HumaLOG) corrective regimen sliding scale   SubCutaneous three times a day before meals  insulin lispro (HumaLOG) corrective regimen sliding scale   SubCutaneous at bedtime  insulin lispro Injectable (HumaLOG) 6 Unit(s) SubCutaneous three times a day before meals    MEDICATIONS  (PRN):  dextrose 40% Gel 15 Gram(s) Oral once PRN Blood Glucose LESS THAN 70 milliGRAM(s)/deciliter  glucagon  Injectable 1 milliGRAM(s) IntraMuscular once PRN Glucose LESS THAN 70 milligrams/deciliter      Allergies    No Known Allergies    Intolerances      Review of Systems:  Constitutional: No fever  Eyes: No blurry vision  Neuro: No headache, has weakness, numbness of left hand  HEENT: No throat pain  Cardiovascular: No chest pain  Respiratory: No SOB, no cough  GI: No abdominal pain  : No dysuria  Skin: no rash  Psych: no depression  Endocrine: as noted in HPI  Hem/lymph: no swelling      PHYSICAL EXAM:  VITALS: T(C): 36.9 (10-14-18 @ 09:14)  T(F): 98.4 (10-14-18 @ 09:14), Max: 98.4 (10-14-18 @ 09:14)  HR: 84 (10-14-18 @ 09:14) (68 - 85)  BP: 127/79 (10-14-18 @ 09:14) (121/98 - 135/98)  RR:  (17 - 18)  SpO2:  (97% - 99%)  Wt(kg): 71.5  GENERAL: Sitting up in bed inNAD,   EYES: No proptosis,  HEENT:  Atraumatic, Normocephalic,   THYROID: Normal size, no palpable nodules  RESPIRATORY: Clear to auscultation bilaterally  CARDIOVASCULAR: Regular rhythm; no peripheral edema  GI: Soft, nontender, non distended, normal bowel sounds  SKIN: Dry, intact, No rashes or lesions  NEURO: extraocular movements intact, no tremor, normal reflexes; Decreased motor strength right upper extremity  PSYCH: Alert and oriented x 3, normal affect, normal mood      POCT Blood Glucose.: 238 mg/dL (10-14-18 @ 12:53)  POCT Blood Glucose.: 223 mg/dL (10-14-18 @ 09:23)  POCT Blood Glucose.: 232 mg/dL (10-13-18 @ 21:46)  POCT Blood Glucose.: 231 mg/dL (10-13-18 @ 17:09)  POCT Blood Glucose.: 233 mg/dL (10-13-18 @ 13:13)  POCT Blood Glucose.: 232 mg/dL (10-13-18 @ 08:44)  POCT Blood Glucose.: 269 mg/dL (10-12-18 @ 23:17)  POCT Blood Glucose.: 242 mg/dL (10-12-18 @ 17:40)  POCT Blood Glucose.: 200 mg/dL (10-12-18 @ 12:48)  POCT Blood Glucose.: 197 mg/dL (10-12-18 @ 08:55)  POCT Blood Glucose.: 289 mg/dL (10-11-18 @ 21:21)  POCT Blood Glucose.: 224 mg/dL (10-11-18 @ 17:09)                            15.7   11.99 )-----------( 320      ( 14 Oct 2018 04:10 )             47.3       10-    136  |  99  |  15  ----------------------------<  245<H>  3.8   |  21<L>  |  0.67    EGFR if : 126  EGFR if non : 109    Ca    8.8      10-14  Mg     2.1     10-14  Phos  2.4     10-      Thyroid Function Tests:  10-11 @ 13:51 TSH 1.70 FreeT4 -- T3 -- Anti TPO -- Anti Thyroglobulin Ab -- TSI --      Hemoglobin A1C, Whole Blood: 10.1 % <H> [4.0 - 5.6] (10-11-18 @ 13:51)        Radiology:   EXAM:  CT ANGIO NECK (W)AW IC      EXAM:  CT ANGIO BRAIN (W)AW IC        PROCEDURE DATE:  Oct 10 2018         INTERPRETATION:  HISTORY: Dizziness. Left-sided numbness. Stroke. Rule   out CVA..    Description: A CT scan of the head with and without intravenous contrast   and a CT angiogram study of the neck and head were performed.    The head CT was performed with axial images with coronal and sagittal   reformatted series. The CT angiogram study was performed with axial thin   section images with 2-D and 3-D maximum intensity projection MIP   reformatted series.    90 cc intravenous Omnipaque 350 contrast was administered, 10 cc contrast   was discarded.    Head CT with and without contrast:    Age-indeterminate lacunar infarcts involve the right thalamus and right   lentiform nucleus.    Chronic lacunar infarcts involve the bilateral corona radiata with   associated volume loss and secondary ex vacuo dilatation of the lateral   ventricles.    Mild patchy hypodensity involves white matter, most consistent with   chronic microvascular ischemic changes given the patient's age.    CTA NECK:    Calcified plaque and an approximate 40% stenosis involves origin of the   right internal carotid artery via NASCET criteria.    Calcified plaque and an approximate 10% stenosis involves origin of the   left internal carotid artery via NASCET criteria.    There is no evidence for vertebral artery stenosis in the neck. The left   vertebral artery is dominant.    There is no evidence for commoncarotid artery stenosis.    There is no gross evidence for carotid or vertebral artery dissection.    CTA HEAD:    A right posterior communicating artery is present with associated   hypoplastic right P1 segment, a normal variant.    There is no evidence for focal stenosis, major vessel occlusion, or   aneurysm about the [place]. Tiny aneurysms can be beyond the   resolution of CTA imaging technique.    IMPRESSION:    Head CT:    Age-indeterminate lacunar infarcts involve the right thalamus and right   lentiform nucleus. Chronic lacunar infarcts and mild chronic white matter   changes are noted as described. If the patient has a new and persistent   neurologic deficit, consider brain MRI imaging follow-up.    CTA NECK:    Mild to moderate stenosis involves origin of the right internal carotid   artery. Minimal stenosis involves origin of the left internal carotid   artery.    No evidence for vertebral artery stenosis.    CTA head:    No evidence for significant intracranial stenosis or major vessel   occlusion.

## 2018-10-14 NOTE — PROGRESS NOTE ADULT - SUBJECTIVE AND OBJECTIVE BOX
Patient is a 53y old  Male who presents with a chief complaint of Left sided numbness and weakness with headache (14 Oct 2018 14:25)      SUBJECTIVE / OVERNIGHT EVENTS:  Pt reports some tingling in left arm and leg. Feels left leg stronger. no difficulty in speech or swallow. Feels better overall. no cp/sob/dizziness.     MEDICATIONS  (STANDING):  aspirin enteric coated 81 milliGRAM(s) Oral daily  atorvastatin 80 milliGRAM(s) Oral at bedtime  clopidogrel Tablet 75 milliGRAM(s) Oral daily  dextrose 5%. 1000 milliLiter(s) (50 mL/Hr) IV Continuous <Continuous>  dextrose 50% Injectable 12.5 Gram(s) IV Push once  dextrose 50% Injectable 25 Gram(s) IV Push once  dextrose 50% Injectable 25 Gram(s) IV Push once  heparin  Injectable 5000 Unit(s) SubCutaneous every 12 hours  influenza   Vaccine 0.5 milliLiter(s) IntraMuscular once  insulin glargine Injectable (LANTUS) 15 Unit(s) SubCutaneous at bedtime  insulin lispro (HumaLOG) corrective regimen sliding scale   SubCutaneous three times a day before meals  insulin lispro (HumaLOG) corrective regimen sliding scale   SubCutaneous at bedtime  insulin lispro Injectable (HumaLOG) 5 Unit(s) SubCutaneous three times a day before meals    MEDICATIONS  (PRN):  dextrose 40% Gel 15 Gram(s) Oral once PRN Blood Glucose LESS THAN 70 milliGRAM(s)/deciliter  glucagon  Injectable 1 milliGRAM(s) IntraMuscular once PRN Glucose LESS THAN 70 milligrams/deciliter      T(C): 36.9 (10-14-18 @ 14:30), Max: 36.9 (10-14-18 @ 09:14)  HR: 90 (10-14-18 @ 14:30) (68 - 90)  BP: 145/90 (10-14-18 @ 14:30) (121/98 - 145/90)  RR: 18 (10-14-18 @ 14:30) (17 - 18)  SpO2: 100% (10-14-18 @ 14:30) (97% - 100%)  CAPILLARY BLOOD GLUCOSE      POCT Blood Glucose.: 238 mg/dL (14 Oct 2018 12:53)  POCT Blood Glucose.: 223 mg/dL (14 Oct 2018 09:23)  POCT Blood Glucose.: 232 mg/dL (13 Oct 2018 21:46)  POCT Blood Glucose.: 231 mg/dL (13 Oct 2018 17:09)    I&O's Summary      PHYSICAL EXAM:  GENERAL: NAD, well-developed  HEAD:  Atraumatic, Normocephalic  EYES: EOMI, PERRLA, conjunctiva and sclera clear  NECK: Supple, No JVD  CHEST/LUNG: Clear to auscultation bilaterally; No wheeze  HEART: s1 s2, regular rhythm and rate   ABDOMEN: Soft, Nontender, Nondistended; Bowel sounds present  EXTREMITIES:  2+ Peripheral Pulses, No clubbing, cyanosis, or edema  PSYCH: AAOx3, calm   NEUROLOGY: Left side weaker than right, but strengh 5/5 through muscles groups   SKIN: No rashes or lesions    LABS:                        15.7   11.99 )-----------( 320      ( 14 Oct 2018 04:10 )             47.3     10-14    136  |  99  |  15  ----------------------------<  245<H>  3.8   |  21<L>  |  0.67    Ca    8.8      14 Oct 2018 04:10  Mg     2.1     10-14                RADIOLOGY & ADDITIONAL TESTS:    Imaging Personally Reviewed:    Consultant(s) Notes Reviewed:      Care Discussed with Consultants/Other Providers: Patient is a 53y old  Male who presents with a chief complaint of Left sided numbness and weakness with headache (14 Oct 2018 14:25)      SUBJECTIVE / OVERNIGHT EVENTS:  Pt reports some tingling in left arm and leg. Feels left leg stronger. no difficulty in speech or swallow. Feels better overall. no cp/sob/dizziness. Tele SR.     MEDICATIONS  (STANDING):  aspirin enteric coated 81 milliGRAM(s) Oral daily  atorvastatin 80 milliGRAM(s) Oral at bedtime  clopidogrel Tablet 75 milliGRAM(s) Oral daily  dextrose 5%. 1000 milliLiter(s) (50 mL/Hr) IV Continuous <Continuous>  dextrose 50% Injectable 12.5 Gram(s) IV Push once  dextrose 50% Injectable 25 Gram(s) IV Push once  dextrose 50% Injectable 25 Gram(s) IV Push once  heparin  Injectable 5000 Unit(s) SubCutaneous every 12 hours  influenza   Vaccine 0.5 milliLiter(s) IntraMuscular once  insulin glargine Injectable (LANTUS) 15 Unit(s) SubCutaneous at bedtime  insulin lispro (HumaLOG) corrective regimen sliding scale   SubCutaneous three times a day before meals  insulin lispro (HumaLOG) corrective regimen sliding scale   SubCutaneous at bedtime  insulin lispro Injectable (HumaLOG) 5 Unit(s) SubCutaneous three times a day before meals    MEDICATIONS  (PRN):  dextrose 40% Gel 15 Gram(s) Oral once PRN Blood Glucose LESS THAN 70 milliGRAM(s)/deciliter  glucagon  Injectable 1 milliGRAM(s) IntraMuscular once PRN Glucose LESS THAN 70 milligrams/deciliter      T(C): 36.9 (10-14-18 @ 14:30), Max: 36.9 (10-14-18 @ 09:14)  HR: 90 (10-14-18 @ 14:30) (68 - 90)  BP: 145/90 (10-14-18 @ 14:30) (121/98 - 145/90)  RR: 18 (10-14-18 @ 14:30) (17 - 18)  SpO2: 100% (10-14-18 @ 14:30) (97% - 100%)  CAPILLARY BLOOD GLUCOSE      POCT Blood Glucose.: 238 mg/dL (14 Oct 2018 12:53)  POCT Blood Glucose.: 223 mg/dL (14 Oct 2018 09:23)  POCT Blood Glucose.: 232 mg/dL (13 Oct 2018 21:46)  POCT Blood Glucose.: 231 mg/dL (13 Oct 2018 17:09)    I&O's Summary      PHYSICAL EXAM:  GENERAL: NAD, well-developed  HEAD:  Atraumatic, Normocephalic  EYES: EOMI, PERRLA, conjunctiva and sclera clear  NECK: Supple, No JVD  CHEST/LUNG: Clear to auscultation bilaterally; No wheeze  HEART: s1 s2, regular rhythm and rate   ABDOMEN: Soft, Nontender, Nondistended; Bowel sounds present  EXTREMITIES:  2+ Peripheral Pulses, No clubbing, cyanosis, or edema  PSYCH: AAOx3, calm   NEUROLOGY: Left side weaker than right, but strengh 5/5 through muscles groups   SKIN: No rashes or lesions    LABS:                        15.7   11.99 )-----------( 320      ( 14 Oct 2018 04:10 )             47.3     10-14    136  |  99  |  15  ----------------------------<  245<H>  3.8   |  21<L>  |  0.67    Ca    8.8      14 Oct 2018 04:10  Mg     2.1     10-14                RADIOLOGY & ADDITIONAL TESTS:    Imaging Personally Reviewed:    Consultant(s) Notes Reviewed:      Care Discussed with Consultants/Other Providers:

## 2018-10-14 NOTE — PROGRESS NOTE ADULT - PROBLEM SELECTOR PLAN 3
A1C 10. Pt has h/o DM, was on metformin, but has not seen a physician for years and not on any med at this time   Endo consulted  start lantus 15 with humalog 5 qAC  monitor FS

## 2018-10-14 NOTE — PROGRESS NOTE ADULT - SUBJECTIVE AND OBJECTIVE BOX
· Subjective and Objective: 	  Neurology Progress    BEKA [id number]    HPI:  54 y/o right handed [place] M with PMH of DM presented with the complaint of left sided UE/LE numbness and weakness with dizziness and headache since this morning. As per the patient he was in his usual state of health and was on his way to go to work this morning and then developed left sided upper and lower extremity weakness and numbness. At work patient developed frontal and parietal headache with associated dizziness, denied any photophobia or phonophobia. Patient stated that he took a baby aspirin when the headache started. Patient stated that when he walked he felt like he was about to fall due to the dizziness. Patient also stated that 2/2 to the dizziness he had 20 episodes of NBNB vomiting today prior to coming to the ER. Patient stated that when he was walking he felt like he was dragging his left foot. Patient denied any slurred speech, changes in vision, difficulty swallowing. Patient denied any CP, fevers, SOB, D/C, abdominal pain, dysuria, melena, hematochezia, recent travel, sick contact, pleuritic or positional chest pain.     On ED admission EKG revealed  NSR at a rate of 83 with TWI in leads AVF and V5-V6 and QTc of 425, CE x 1: Negative, WBC: 17.50, Gluc: 344, Protein: 88, Alb: 5.1, Lactate: 3.4. Head CT: Age-indeterminate lacunar infarcts involve the right thalamus and right lentiform nucleus. Chronic lacunar infarcts and mild chronic white matter changes are noted as described. If the patient has a new and persistent  neurologic deficit, consider brain MRI imaging follow-up. CTA NECK: Mild to moderate stenosis involves origin of the right internal carotid artery. Minimal stenosis involves origin of the left internal carotid artery. No evidence for vertebral artery stenosis. Patient was seen by neurology and their recommendation noted.        No family history pertinent to patient’s current condition/encounter (11 Oct 2018 01:14)      Past Medical History  Diabetes      Past Surgical History  No significant past surgical history      MEDICATIONS    aspirin enteric coated 81 milliGRAM(s) Oral daily  atorvastatin 80 milliGRAM(s) Oral at bedtime  clopidogrel Tablet 75 milliGRAM(s) Oral daily  dextrose 40% Gel 15 Gram(s) Oral once PRN  dextrose 5%. 1000 milliLiter(s) IV Continuous <Continuous>  dextrose 50% Injectable 12.5 Gram(s) IV Push once  dextrose 50% Injectable 25 Gram(s) IV Push once  dextrose 50% Injectable 25 Gram(s) IV Push once  glucagon  Injectable 1 milliGRAM(s) IntraMuscular once PRN  heparin  Injectable 5000 Unit(s) SubCutaneous every 12 hours  influenza   Vaccine 0.5 milliLiter(s) IntraMuscular once  insulin lispro (HumaLOG) corrective regimen sliding scale   SubCutaneous three times a day before meals  insulin lispro (HumaLOG) corrective regimen sliding scale   SubCutaneous at bedtime         Family history: No history of dementia, strokes, or seizures   FAMILY HISTORY:  No pertinent family history in first degree relatives    SOCIAL HISTORY -- No history of tobacco or alcohol use     Allergies    No Known Allergies    Intolerances            Vital Signs Last 24 Hrs  T(C): 36.8  HR: 70  BP: 138/80  RR: 17  On Neurological Examination:    Mental Status - Patient is alert, awake, oriented X3. .   Follows commands well and able to answer questions appropriately. Mood and affect  normal  Follow simple commands able to repeat  able to name.  Speech - Fluent no Dysarthria  no  Aphasia                              Cranial Nerves - Extraocular muscle intact  GIORGIO Facial symmetry Tongue midline, CnV1to V3 intact gross hearing intact      Motor Exam -   Right upper  5/5 throughout  Left upper 5/5 throughtout  Right lower- 5/5 throughout  Left lower -5/5 throughout  Coordination -finger to nose intact  Muscle tone - is normal all over. No asymmetry is seen.      Sensory   left hemisensory    GENERAL Exam:     Nontoxic , No Acute Distress   	  HEENT:  normocephalic, atraumatic  		  LUNGS:	Clear bilaterally  No Wheeze      VASCULAR: no carotid brui  	  HEART:	 Normal S1S2   No murmur RRR        	  MUSCULOSKELETAL: Normal Range of Motion  	   SKIN:      Normal   No Ecchymosis               LABS:  CBC Full  -  ( 13 Oct 2018 06:55 )  WBC Count : 10.19 K/uL  Hemoglobin : 14.3 g/dL  Hematocrit : 43.2 %  Platelet Count - Automated : 280 K/uL  Mean Cell Volume : 84.4 fL  Mean Cell Hemoglobin : 27.9 pg  Mean Cell Hemoglobin Concentration : 33.1 %  Auto Neutrophil # : x  Auto Lymphocyte # : x  Auto Monocyte # : x  Auto Eosinophil # : x  Auto Basophil # : x  Auto Neutrophil % : x  Auto Lymphocyte % : x  Auto Monocyte % : x  Auto Eosinophil % : x  Auto Basophil % : x      10-13    134<L>  |  101  |  12  ----------------------------<  224<H>  4.2   |  20<L>  |  0.60    Ca    8.9      13 Oct 2018 06:55  Phos  2.4     10-12  Mg     2.1     10-13      Hemoglobin A1C:       Vitamin B12         RADIOLOGY    < from: MR Head No Cont (10.11.18 @ 08:50) >  EXAM:  MR BRAIN        PROCEDURE DATE:  Oct 11 2018         INTERPRETATION:  HISTORY: Left-sided weakness and numbness. CVA. Stroke.   Dizziness..    Description: A noncontrast brain MRI was performed.     Comparison is made to a head CT study from10/10/2018.    The study was performed with sagittal T1, axial T1, T2, FLAIR, SWI, and   diffusion-weighted series with ADC maps.    The study is limited by motion.    On the diffusion-weighted series and ADC maps, an acute infarct involves   the right pontomedullary junction, without hemorrhagic transformation.    Chronic lacunar infarcts involve the right thalamus, right lentiform   nucleus, and right corona radiata.    There is no evidence for acute hemorrhage, mass effect, or hydrocephalus.    Mild patchy increased T2 and FLAIR signal is present throughout the   periventricular and subcortical white matter of the cerebral hemispheres   without mass effect which most likely represents chronic microvascular   ischemic changes given the patient's age. Cerebral volume loss is noted   with secondary proportional prominence of the sulci and ventricles.    I discussed the exam findings with the covering neurology resident at   9:10 AM on 10/11/2018 with read back.    IMPRESSION:    An 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

## 2018-10-15 LAB
BUN SERPL-MCNC: 16 MG/DL — SIGNIFICANT CHANGE UP (ref 7–23)
CALCIUM SERPL-MCNC: 9.5 MG/DL — SIGNIFICANT CHANGE UP (ref 8.4–10.5)
CHLORIDE SERPL-SCNC: 99 MMOL/L — SIGNIFICANT CHANGE UP (ref 98–107)
CO2 SERPL-SCNC: 20 MMOL/L — LOW (ref 22–31)
CREAT SERPL-MCNC: 0.62 MG/DL — SIGNIFICANT CHANGE UP (ref 0.5–1.3)
GLUCOSE SERPL-MCNC: 260 MG/DL — HIGH (ref 70–99)
HCT VFR BLD CALC: 45.9 % — SIGNIFICANT CHANGE UP (ref 39–50)
HGB BLD-MCNC: 15.5 G/DL — SIGNIFICANT CHANGE UP (ref 13–17)
MAGNESIUM SERPL-MCNC: 2.1 MG/DL — SIGNIFICANT CHANGE UP (ref 1.6–2.6)
MCHC RBC-ENTMCNC: 28.8 PG — SIGNIFICANT CHANGE UP (ref 27–34)
MCHC RBC-ENTMCNC: 33.8 % — SIGNIFICANT CHANGE UP (ref 32–36)
MCV RBC AUTO: 85.2 FL — SIGNIFICANT CHANGE UP (ref 80–100)
NRBC # FLD: 0 — SIGNIFICANT CHANGE UP
PHOSPHATE SERPL-MCNC: 3.8 MG/DL — SIGNIFICANT CHANGE UP (ref 2.5–4.5)
PLATELET # BLD AUTO: 330 K/UL — SIGNIFICANT CHANGE UP (ref 150–400)
PMV BLD: 10.8 FL — SIGNIFICANT CHANGE UP (ref 7–13)
POTASSIUM SERPL-MCNC: 4 MMOL/L — SIGNIFICANT CHANGE UP (ref 3.5–5.3)
POTASSIUM SERPL-SCNC: 4 MMOL/L — SIGNIFICANT CHANGE UP (ref 3.5–5.3)
RBC # BLD: 5.39 M/UL — SIGNIFICANT CHANGE UP (ref 4.2–5.8)
RBC # FLD: 12.5 % — SIGNIFICANT CHANGE UP (ref 10.3–14.5)
SODIUM SERPL-SCNC: 134 MMOL/L — LOW (ref 135–145)
WBC # BLD: 12.95 K/UL — HIGH (ref 3.8–10.5)
WBC # FLD AUTO: 12.95 K/UL — HIGH (ref 3.8–10.5)

## 2018-10-15 PROCEDURE — 99232 SBSQ HOSP IP/OBS MODERATE 35: CPT

## 2018-10-15 PROCEDURE — 99232 SBSQ HOSP IP/OBS MODERATE 35: CPT | Mod: GC

## 2018-10-15 RX ORDER — INSULIN LISPRO 100/ML
7 VIAL (ML) SUBCUTANEOUS
Qty: 0 | Refills: 0 | Status: DISCONTINUED | OUTPATIENT
Start: 2018-10-15 | End: 2018-10-16

## 2018-10-15 RX ORDER — INSULIN GLARGINE 100 [IU]/ML
20 INJECTION, SOLUTION SUBCUTANEOUS AT BEDTIME
Qty: 0 | Refills: 0 | Status: DISCONTINUED | OUTPATIENT
Start: 2018-10-15 | End: 2018-10-16

## 2018-10-15 RX ADMIN — Medication 7 UNIT(S): at 18:14

## 2018-10-15 RX ADMIN — Medication 5 UNIT(S): at 13:21

## 2018-10-15 RX ADMIN — Medication 3: at 18:14

## 2018-10-15 RX ADMIN — Medication 81 MILLIGRAM(S): at 13:03

## 2018-10-15 RX ADMIN — ATORVASTATIN CALCIUM 80 MILLIGRAM(S): 80 TABLET, FILM COATED ORAL at 22:16

## 2018-10-15 RX ADMIN — CLOPIDOGREL BISULFATE 75 MILLIGRAM(S): 75 TABLET, FILM COATED ORAL at 13:03

## 2018-10-15 RX ADMIN — Medication 2: at 08:59

## 2018-10-15 RX ADMIN — HEPARIN SODIUM 5000 UNIT(S): 5000 INJECTION INTRAVENOUS; SUBCUTANEOUS at 18:14

## 2018-10-15 RX ADMIN — Medication 4: at 13:22

## 2018-10-15 RX ADMIN — Medication 5 UNIT(S): at 08:59

## 2018-10-15 RX ADMIN — INSULIN GLARGINE 20 UNIT(S): 100 INJECTION, SOLUTION SUBCUTANEOUS at 22:17

## 2018-10-15 RX ADMIN — HEPARIN SODIUM 5000 UNIT(S): 5000 INJECTION INTRAVENOUS; SUBCUTANEOUS at 05:23

## 2018-10-15 NOTE — PROGRESS NOTE ADULT - SUBJECTIVE AND OBJECTIVE BOX
Chief Complaint:     History:    MEDICATIONS  (STANDING):  aspirin enteric coated 81 milliGRAM(s) Oral daily  atorvastatin 80 milliGRAM(s) Oral at bedtime  clopidogrel Tablet 75 milliGRAM(s) Oral daily  dextrose 5%. 1000 milliLiter(s) (50 mL/Hr) IV Continuous <Continuous>  dextrose 50% Injectable 12.5 Gram(s) IV Push once  dextrose 50% Injectable 25 Gram(s) IV Push once  dextrose 50% Injectable 25 Gram(s) IV Push once  heparin  Injectable 5000 Unit(s) SubCutaneous every 12 hours  influenza   Vaccine 0.5 milliLiter(s) IntraMuscular once  insulin glargine Injectable (LANTUS) 15 Unit(s) SubCutaneous at bedtime  insulin lispro (HumaLOG) corrective regimen sliding scale   SubCutaneous three times a day before meals  insulin lispro (HumaLOG) corrective regimen sliding scale   SubCutaneous at bedtime  insulin lispro Injectable (HumaLOG) 5 Unit(s) SubCutaneous three times a day before meals    MEDICATIONS  (PRN):  dextrose 40% Gel 15 Gram(s) Oral once PRN Blood Glucose LESS THAN 70 milliGRAM(s)/deciliter  glucagon  Injectable 1 milliGRAM(s) IntraMuscular once PRN Glucose LESS THAN 70 milligrams/deciliter      Allergies    No Known Allergies    Intolerances      Review of Systems:  Constitutional: No fever  Eyes: No blurry vision  Neuro: No tremors  HEENT: No pain  Cardiovascular: No chest pain, palpitations  Respiratory: No SOB, no cough  GI: No nausea, vomiting, abdominal pain  : No dysuria  Skin: no rash  Psych: no depression  Endocrine: no polyuria, polydipsia  Hem/lymph: no swelling  Osteoporosis: no fractures    ALL OTHER SYSTEMS REVIEWED AND NEGATIVE    UNABLE TO OBTAIN    PHYSICAL EXAM:  VITALS: T(C): 37 (10-15-18 @ 14:26)  T(F): 98.6 (10-15-18 @ 14:26), Max: 98.6 (10-15-18 @ 14:26)  HR: 82 (10-15-18 @ 14:26) (72 - 97)  BP: 136/82 (10-15-18 @ 14:26) (116/85 - 150/84)  RR:  (16 - 19)  SpO2:  (100% - 100%)  Wt(kg): --  GENERAL: NAD, well-groomed, well-developed  EYES: No proptosis, no lid lag, anicteric  HEENT:  Atraumatic, Normocephalic, moist mucous membranes  THYROID: Normal size, no palpable nodules  RESPIRATORY: Clear to auscultation bilaterally; No rales, rhonchi, wheezing  CARDIOVASCULAR: Regular rate and rhythm; No murmurs; no peripheral edema  GI: Soft, nontender, non distended  SKIN: Dry, intact, No rashes or lesions  MUSCULOSKELETAL: Full range of motion, normal strength  NEURO: extraocular movements intact, no tremor  PSYCH: Alert and oriented x 3, normal affect, normal mood      CAPILLARY BLOOD GLUCOSE      POCT Blood Glucose.: 330 mg/dL (15 Oct 2018 13:11)  POCT Blood Glucose.: 236 mg/dL (15 Oct 2018 08:42)  POCT Blood Glucose.: 257 mg/dL (14 Oct 2018 22:07)  POCT Blood Glucose.: 258 mg/dL (14 Oct 2018 18:54)  POCT Blood Glucose.: 225 mg/dL (14 Oct 2018 17:45)      10-15    134<L>  |  99  |  16  ----------------------------<  260<H>  4.0   |  20<L>  |  0.62    EGFR if : 130  EGFR if non : 113    Ca    9.5      10-15  Mg     2.1     10-15  Phos  3.8     10-15            Thyroid Function Tests:  10-11 @ 13:51 TSH 1.70 FreeT4 -- T3 -- Anti TPO -- Anti Thyroglobulin Ab -- TSI --      Hemoglobin A1C, Whole Blood: 10.1 % <H> [4.0 - 5.6] (10-11-18 @ 13:51) Chief Complaint: t2dm    History:  no n/v tolerates po, no hypoglycemia..learned vial and syringe today with family    MEDICATIONS  (STANDING):  aspirin enteric coated 81 milliGRAM(s) Oral daily  atorvastatin 80 milliGRAM(s) Oral at bedtime  clopidogrel Tablet 75 milliGRAM(s) Oral daily  dextrose 5%. 1000 milliLiter(s) (50 mL/Hr) IV Continuous <Continuous>  dextrose 50% Injectable 12.5 Gram(s) IV Push once  dextrose 50% Injectable 25 Gram(s) IV Push once  dextrose 50% Injectable 25 Gram(s) IV Push once  heparin  Injectable 5000 Unit(s) SubCutaneous every 12 hours  influenza   Vaccine 0.5 milliLiter(s) IntraMuscular once  insulin glargine Injectable (LANTUS) 15 Unit(s) SubCutaneous at bedtime  insulin lispro (HumaLOG) corrective regimen sliding scale   SubCutaneous three times a day before meals  insulin lispro (HumaLOG) corrective regimen sliding scale   SubCutaneous at bedtime  insulin lispro Injectable (HumaLOG) 5 Unit(s) SubCutaneous three times a day before meals    MEDICATIONS  (PRN):  dextrose 40% Gel 15 Gram(s) Oral once PRN Blood Glucose LESS THAN 70 milliGRAM(s)/deciliter  glucagon  Injectable 1 milliGRAM(s) IntraMuscular once PRN Glucose LESS THAN 70 milligrams/deciliter      Allergies    No Known Allergies    Intolerances      Review of Systems:  Constitutional: No fever  Eyes: No blurry vision      ALL OTHER SYSTEMS REVIEWED AND NEGATIVE        PHYSICAL EXAM:  VITALS: T(C): 37 (10-15-18 @ 14:26)  T(F): 98.6 (10-15-18 @ 14:26), Max: 98.6 (10-15-18 @ 14:26)  HR: 82 (10-15-18 @ 14:26) (72 - 97)  BP: 136/82 (10-15-18 @ 14:26) (116/85 - 150/84)  RR:  (16 - 19)  SpO2:  (100% - 100%)  Wt(kg): --  GENERAL: NAD, well-developed  GI: Soft, nontender, non distended  SKIN: Dry, intact, No rashes or lesions  PSYCH: Alert and oriented x 3, normal affect, normal mood      CAPILLARY BLOOD GLUCOSE      POCT Blood Glucose.: 330 mg/dL (15 Oct 2018 13:11)  POCT Blood Glucose.: 236 mg/dL (15 Oct 2018 08:42)  POCT Blood Glucose.: 257 mg/dL (14 Oct 2018 22:07)  POCT Blood Glucose.: 258 mg/dL (14 Oct 2018 18:54)  POCT Blood Glucose.: 225 mg/dL (14 Oct 2018 17:45)      10-15    134<L>  |  99  |  16  ----------------------------<  260<H>  4.0   |  20<L>  |  0.62    EGFR if : 130  EGFR if non : 113    Ca    9.5      10-15  Mg     2.1     10-15  Phos  3.8     10-15            Thyroid Function Tests:  10-11 @ 13:51 TSH 1.70 FreeT4 -- T3 -- Anti TPO -- Anti Thyroglobulin Ab -- TSI --      Hemoglobin A1C, Whole Blood: 10.1 % <H> [4.0 - 5.6] (10-11-18 @ 13:51)

## 2018-10-15 NOTE — PHARMACOTHERAPY INTERVENTION NOTE - COMMENTS
Pt and family @ bedside instructed on 70/30 mixed insulin (pt has no insurance), need to mix, mixed peaks and risks of hypoglycemia, and proper storage. Educated on A1c, insulin vial administration with demonstration, hypoglycemia and treatment, healthy plate, exercise, and blood glucose monitoring; due to stroke deficits pt had difficulty drawing insulin w/syringe (left hand weakness) but is able to self-inject once dose is drawn-family will help at home.

## 2018-10-15 NOTE — PROGRESS NOTE ADULT - PROBLEM SELECTOR PLAN 1
target bg 100-180 mg/dl  increase lantus to 20 uits sq qhs  increase humalog to 7 uits sq tid ac  c/w current crrection scale fro now    Dc planning- Anticipate Novolin 70 / 30 vials and syringes.  Please send scripts t [facility].  Patient also needs new glucomter, test strips, lancets and syringes.  Family taught.

## 2018-10-15 NOTE — PROGRESS NOTE ADULT - PROBLEM SELECTOR PLAN 1
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

## 2018-10-15 NOTE — PROGRESS NOTE ADULT - PROBLEM SELECTOR PLAN 3
A1C 10. Pt has h/o DM, was on metformin, but has not seen a physician for years and not on any med at this time   appreciate endo recs: increased lantus to 20 units, humolog to 7 units TID  -D/C on 70/30.

## 2018-10-15 NOTE — PROGRESS NOTE ADULT - SUBJECTIVE AND OBJECTIVE BOX
Patient is a 53y old  Male who presents with a chief complaint of Left sided numbness and weakness with headache (14 Oct 2018 14:25)      SUBJECTIVE / OVERNIGHT EVENTS:  No acute events overnight. Patient offers no complaints. Still wtih numbness on left side.     MEDICATIONS  (STANDING):  aspirin enteric coated 81 milliGRAM(s) Oral daily  atorvastatin 80 milliGRAM(s) Oral at bedtime  clopidogrel Tablet 75 milliGRAM(s) Oral daily  dextrose 5%. 1000 milliLiter(s) (50 mL/Hr) IV Continuous <Continuous>  dextrose 50% Injectable 12.5 Gram(s) IV Push once  dextrose 50% Injectable 25 Gram(s) IV Push once  dextrose 50% Injectable 25 Gram(s) IV Push once  heparin  Injectable 5000 Unit(s) SubCutaneous every 12 hours  influenza   Vaccine 0.5 milliLiter(s) IntraMuscular once  insulin glargine Injectable (LANTUS) 15 Unit(s) SubCutaneous at bedtime  insulin lispro (HumaLOG) corrective regimen sliding scale   SubCutaneous three times a day before meals  insulin lispro (HumaLOG) corrective regimen sliding scale   SubCutaneous at bedtime  insulin lispro Injectable (HumaLOG) 5 Unit(s) SubCutaneous three times a day before meals    MEDICATIONS  (PRN):  dextrose 40% Gel 15 Gram(s) Oral once PRN Blood Glucose LESS THAN 70 milliGRAM(s)/deciliter  glucagon  Injectable 1 milliGRAM(s) IntraMuscular once PRN Glucose LESS THAN 70 milligrams/deciliter    ICU Vital Signs Last 24 Hrs  T(C): 37 (15 Oct 2018 14:26), Max: 37 (15 Oct 2018 14:26)  T(F): 98.6 (15 Oct 2018 14:26), Max: 98.6 (15 Oct 2018 14:26)  HR: 82 (15 Oct 2018 14:26) (72 - 97)  BP: 136/82 (15 Oct 2018 14:26) (116/85 - 150/84)  BP(mean): --  ABP: --  ABP(mean): --  RR: 19 (15 Oct 2018 14:26) (16 - 19)  SpO2: 100% (15 Oct 2018 14:26) (100% - 100%)    CAPILLARY BLOOD GLUCOSE      POCT Blood Glucose.: 330 mg/dL (15 Oct 2018 13:11)  POCT Blood Glucose.: 236 mg/dL (15 Oct 2018 08:42)  POCT Blood Glucose.: 257 mg/dL (14 Oct 2018 22:07)  POCT Blood Glucose.: 258 mg/dL (14 Oct 2018 18:54)  POCT Blood Glucose.: 225 mg/dL (14 Oct 2018 17:45)        PHYSICAL EXAM:  GENERAL: NAD, well-developed  HEAD:  Atraumatic, Normocephalic  EYES: EOMI, PERRLA, conjunctiva and sclera clear  NECK: Supple, No JVD  CHEST/LUNG: Clear to auscultation bilaterally; No wheeze  HEART: s1 s2, regular rhythm and rate   ABDOMEN: Soft, Nontender, Nondistended; Bowel sounds present  EXTREMITIES:  2+ Peripheral Pulses, No clubbing, cyanosis, or edema  PSYCH: AAOx3, calm   NEUROLOGY: Left side weaker than right, but strengh 5/5 through muscles groups   SKIN: No rashes or lesions    LABS:                             15.5   12.95 )-----------( 330      ( 15 Oct 2018 07:21 )             45.9   10-15    134<L>  |  99  |  16  ----------------------------<  260<H>  4.0   |  20<L>  |  0.62    Ca    9.5      15 Oct 2018 07:21  Phos  3.8     10-15  Mg     2.1     10-15              RADIOLOGY & ADDITIONAL TESTS:    Imaging Personally Reviewed:    Consultant(s) Notes Reviewed:  PM&R    Care Discussed with Consultants/Other Providers: Neurology resident.

## 2018-10-15 NOTE — PROGRESS NOTE ADULT - SUBJECTIVE AND OBJECTIVE BOX
HPI:  52 y/o right handed [place] Man with PMH of DM presented with the complaint of left sided UE/LE numbness and weakness with dizziness and headache .  As per the patient he was in his usual state of health and was on his way to go to work this morning and then developed left sided upper and lower extremity weakness and numbness. At work patient developed frontal and parietal headache with associated dizziness, denied any photophobia or phonophobia. Patient stated that he took a baby aspirin when the headache started. Patient stated that when he walked he felt like he was about to fall due to the dizziness. Patient also stated that 2/2 to the dizziness he had 20 episodes of NBNB vomiting today prior to coming to the ER. Patient stated that when he was walking he felt like he was dragging his left foot. Patient denied any slurred speech, changes in vision, difficulty swallowing. Patient denied any CP, fevers, SOB, D/C, abdominal pain, dysuria, melena, hematochezia, recent travel, sick contact, pleuritic or positional chest pain.     On ED admission EKG revealed  NSR at a rate of 83 with TWI in leads AVF and V5-V6 and QTc of 425, CE x 1: Negative, WBC: 17.50, Gluc: 344, Protein: 88, Alb: 5.1, Lactate: 3.4. Head CT: Age-indeterminate lacunar infarcts involve the right thalamus and right lentiform nucleus. Chronic lacunar infarcts and mild chronic white matter changes are noted as described. If the patient has a new and persistent  neurologic deficit, consider brain MRI imaging follow-up. CTA NECK: Mild to moderate stenosis involves origin of the right internal carotid artery. Minimal stenosis involves origin of the left internal carotid artery. No evidence for vertebral artery stenosis. Patient was seen by neurology and their recommendation noted. Patient seen by SLP and put on modified diet. 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    REVIEW OF SYSTEMS  Constitutional: No fever, No weight loss, No fatigue  HEENT: + mild dysphagia,  +/-Headache,   Pulm: No cough, No SOB  Cardio: No chest pain, No palpitations  GI:  No abdominal pain, LBM few days ago,  No incontinence  : No dysuria, No frequency, No retention, No incontinence  Neuro:  +loss of strength, No sensation defictis   Skin: No itching, No rash, No lesions   Endo: No Diabetic symptoms, No Thyroid symptoms  MSK: No joint pain, No joint swelling, No muscle pain   Psych:  No depression, No anxiety      CURRENT FUNCTIONAL STATUS  - Bed Mobility: Min A   - Transfers:  Mod A  - Gait: MOD A 5 FEET   - ADLs: UE dress Min A and LE dressing MOD A     Vital Signs Last 24 Hrs  T(C): 36.8 (15 Oct 2018 05:23), Max: 36.9 (14 Oct 2018 14:30)  T(F): 98.2 (15 Oct 2018 05:23), Max: 98.4 (14 Oct 2018 14:30)  HR: 72 (15 Oct 2018 05:23) (72 - 97)  BP: 116/85 (15 Oct 2018 05:23) (116/85 - 150/84)  BP(mean): --  RR: 18 (15 Oct 2018 05:23) (16 - 18)  SpO2: 100% (15 Oct 2018 05:23) (100% - 100%)  ----------------------------------------------------------------------------------------    PHYSICAL EXAM  Constitutional: NAD, Resting Comfortable  Neck: Supple, FROM,   Chest: No Respiratory Distress  Ext: No C/C/E, Pulses 2+ throughout, No calf tenderness   Neuro Exam      Cognitive: AAO x 3     Communication:  Fluent, No dysarthria      CN II - XII  intact         Coordination: FTN impaired on left     Motor             LEFT    UE:  SF 4+/5, EF 4+/5, EE 4+/5, WE 4+/5, Hand intrinsic 4+/5,  mild diminished             RIGHT UE:  SF 5/5, EF 5/5, EE 5/5, WE 5/5, Hand Intrinsic 5/5,  wnl             LEFT    LE:  HF 4/5, KE 5/5, DF 5/5, EHL 5/5,  PF 5/5             RIGHT LE:  HF 5/5, KE 5/5, DF 5/5, EHL 5/5, PF 5/5        Sensory: Intact to light touch     Tone: Normal     Reflexes: DTR Intact,  Negative Hoffmans, Negative Babinski      Gait & Balance: ataxic gait   Psychiatric: Affect WNL      ASSESSMENT/PLAN  53 year-old Man with a PMH of DM found to have 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      Recommend   - Disposition:  ACUTE inpatient rehabilitation for the functional deficits consisting of 3 hours of therapy/day & 24 hour RN/daily PMR physician for comorbid medical management. Patient can tolerate intensive therapy consisting of PT/OT and or SLP.  Will continue to follow for ongoing rehab needs and recommendations.  - c/w PT for Bed Mobility, Transfers, Ambulation with AD   - c/w OT for ADLs  - Turn Q2hrs while in bed, OOB to chair when possible to prevent Pressure Injury   - c/w modified diet for dysphagia

## 2018-10-15 NOTE — PROGRESS NOTE ADULT - ASSESSMENT
52 y/o M with PMH of DM presented with the complaint of left sided UE/LE numbness admitted for acute CVA

## 2018-10-16 PROBLEM — E11.9 TYPE 2 DIABETES MELLITUS WITHOUT COMPLICATIONS: Chronic | Status: ACTIVE | Noted: 2018-10-10

## 2018-10-16 LAB
BUN SERPL-MCNC: 20 MG/DL — SIGNIFICANT CHANGE UP (ref 7–23)
CALCIUM SERPL-MCNC: 9.8 MG/DL — SIGNIFICANT CHANGE UP (ref 8.4–10.5)
CHLORIDE SERPL-SCNC: 98 MMOL/L — SIGNIFICANT CHANGE UP (ref 98–107)
CO2 SERPL-SCNC: 21 MMOL/L — LOW (ref 22–31)
CREAT SERPL-MCNC: 0.69 MG/DL — SIGNIFICANT CHANGE UP (ref 0.5–1.3)
GLUCOSE SERPL-MCNC: 258 MG/DL — HIGH (ref 70–99)
HCT VFR BLD CALC: 47.8 % — SIGNIFICANT CHANGE UP (ref 39–50)
HGB BLD-MCNC: 15.9 G/DL — SIGNIFICANT CHANGE UP (ref 13–17)
MAGNESIUM SERPL-MCNC: 2.2 MG/DL — SIGNIFICANT CHANGE UP (ref 1.6–2.6)
MCHC RBC-ENTMCNC: 28.6 PG — SIGNIFICANT CHANGE UP (ref 27–34)
MCHC RBC-ENTMCNC: 33.3 % — SIGNIFICANT CHANGE UP (ref 32–36)
MCV RBC AUTO: 86 FL — SIGNIFICANT CHANGE UP (ref 80–100)
NRBC # FLD: 0 — SIGNIFICANT CHANGE UP
PLATELET # BLD AUTO: 334 K/UL — SIGNIFICANT CHANGE UP (ref 150–400)
PMV BLD: 10.6 FL — SIGNIFICANT CHANGE UP (ref 7–13)
POTASSIUM SERPL-MCNC: 4.2 MMOL/L — SIGNIFICANT CHANGE UP (ref 3.5–5.3)
POTASSIUM SERPL-SCNC: 4.2 MMOL/L — SIGNIFICANT CHANGE UP (ref 3.5–5.3)
RBC # BLD: 5.56 M/UL — SIGNIFICANT CHANGE UP (ref 4.2–5.8)
RBC # FLD: 12.4 % — SIGNIFICANT CHANGE UP (ref 10.3–14.5)
SODIUM SERPL-SCNC: 135 MMOL/L — SIGNIFICANT CHANGE UP (ref 135–145)
WBC # BLD: 14.2 K/UL — HIGH (ref 3.8–10.5)
WBC # FLD AUTO: 14.2 K/UL — HIGH (ref 3.8–10.5)

## 2018-10-16 PROCEDURE — 99232 SBSQ HOSP IP/OBS MODERATE 35: CPT

## 2018-10-16 PROCEDURE — 99232 SBSQ HOSP IP/OBS MODERATE 35: CPT | Mod: GC

## 2018-10-16 RX ORDER — INSULIN LISPRO 100/ML
9 VIAL (ML) SUBCUTANEOUS
Qty: 0 | Refills: 0 | Status: DISCONTINUED | OUTPATIENT
Start: 2018-10-16 | End: 2018-10-17

## 2018-10-16 RX ORDER — INSULIN LISPRO 100/ML
VIAL (ML) SUBCUTANEOUS
Qty: 0 | Refills: 0 | Status: DISCONTINUED | OUTPATIENT
Start: 2018-10-16 | End: 2018-10-18

## 2018-10-16 RX ORDER — INSULIN GLARGINE 100 [IU]/ML
25 INJECTION, SOLUTION SUBCUTANEOUS AT BEDTIME
Qty: 0 | Refills: 0 | Status: DISCONTINUED | OUTPATIENT
Start: 2018-10-16 | End: 2018-10-17

## 2018-10-16 RX ORDER — INSULIN LISPRO 100/ML
VIAL (ML) SUBCUTANEOUS AT BEDTIME
Qty: 0 | Refills: 0 | Status: DISCONTINUED | OUTPATIENT
Start: 2018-10-16 | End: 2018-10-18

## 2018-10-16 RX ADMIN — HEPARIN SODIUM 5000 UNIT(S): 5000 INJECTION INTRAVENOUS; SUBCUTANEOUS at 06:57

## 2018-10-16 RX ADMIN — Medication 81 MILLIGRAM(S): at 13:06

## 2018-10-16 RX ADMIN — Medication 9 UNIT(S): at 17:43

## 2018-10-16 RX ADMIN — Medication 4: at 13:06

## 2018-10-16 RX ADMIN — Medication 4: at 17:43

## 2018-10-16 RX ADMIN — Medication 7 UNIT(S): at 09:13

## 2018-10-16 RX ADMIN — INSULIN GLARGINE 25 UNIT(S): 100 INJECTION, SOLUTION SUBCUTANEOUS at 21:35

## 2018-10-16 RX ADMIN — ATORVASTATIN CALCIUM 80 MILLIGRAM(S): 80 TABLET, FILM COATED ORAL at 21:34

## 2018-10-16 RX ADMIN — CLOPIDOGREL BISULFATE 75 MILLIGRAM(S): 75 TABLET, FILM COATED ORAL at 13:06

## 2018-10-16 RX ADMIN — HEPARIN SODIUM 5000 UNIT(S): 5000 INJECTION INTRAVENOUS; SUBCUTANEOUS at 17:43

## 2018-10-16 RX ADMIN — Medication 9 UNIT(S): at 13:06

## 2018-10-16 RX ADMIN — Medication 3: at 09:13

## 2018-10-16 NOTE — DIETITIAN INITIAL EVALUATION ADULT. - DIET TYPE
DASH/TLC (sodium and cholesterol restricted diet)/consistent carbohydrate (evening snack)/dysphagia 3, soft, nectar consistency fluid

## 2018-10-16 NOTE — DIETITIAN INITIAL EVALUATION ADULT. - NS AS NUTRI INTERV MEALS SNACK
Continue with current diet order provided by Speech for Dysphagia 3 - Nectar Consistency, however would recommend a reevaluation by Speech for bedside swallow evaluation since patient reports tolerating thin liquids

## 2018-10-16 NOTE — PROGRESS NOTE ADULT - ASSESSMENT
54 y/o M with PMH of DM presented with the complaint of left sided UE/LE numbness admitted for acute CVA

## 2018-10-16 NOTE — DIETITIAN INITIAL EVALUATION ADULT. - PERTINENT LABORATORY DATA
10-16 Na135 mmol/L Glu 258 mg/dL<H> K+ 4.2 mmol/L Cr  0.69 mg/dL BUN 20 mg/dL 10-15 Phos 3.8 mg/dL 10-10 Alb 5.1 g/dL<H> 10-11 [id number] 10.1 %<H>. POCT 255, 238, 262, 320 mg/dL

## 2018-10-16 NOTE — PROGRESS NOTE ADULT - SUBJECTIVE AND OBJECTIVE BOX
Patient is a 53y old  Male who presents with a chief complaint of Left sided numbness and weakness with headache (14 Oct 2018 14:25)      SUBJECTIVE / OVERNIGHT EVENTS:  No acute events overnight. Patient offers no complaints. Still wtih numbness on left side.     MEDICATIONS  (STANDING):  aspirin enteric coated 81 milliGRAM(s) Oral daily  atorvastatin 80 milliGRAM(s) Oral at bedtime  clopidogrel Tablet 75 milliGRAM(s) Oral daily  dextrose 5%. 1000 milliLiter(s) (50 mL/Hr) IV Continuous <Continuous>  dextrose 50% Injectable 12.5 Gram(s) IV Push once  dextrose 50% Injectable 25 Gram(s) IV Push once  dextrose 50% Injectable 25 Gram(s) IV Push once  heparin  Injectable 5000 Unit(s) SubCutaneous every 12 hours  influenza   Vaccine 0.5 milliLiter(s) IntraMuscular once  insulin glargine Injectable (LANTUS) 15 Unit(s) SubCutaneous at bedtime  insulin lispro (HumaLOG) corrective regimen sliding scale   SubCutaneous three times a day before meals  insulin lispro (HumaLOG) corrective regimen sliding scale   SubCutaneous at bedtime  insulin lispro Injectable (HumaLOG) 5 Unit(s) SubCutaneous three times a day before meals    MEDICATIONS  (PRN):  dextrose 40% Gel 15 Gram(s) Oral once PRN Blood Glucose LESS THAN 70 milliGRAM(s)/deciliter  glucagon  Injectable 1 milliGRAM(s) IntraMuscular once PRN Glucose LESS THAN 70 milligrams/deciliter  Vital Signs Last 24 Hrs  T(C): 36.5 (16 Oct 2018 06:57), Max: 37.1 (15 Oct 2018 22:15)  T(F): 97.7 (16 Oct 2018 06:57), Max: 98.7 (15 Oct 2018 22:15)  HR: 76 (16 Oct 2018 06:57) (76 - 82)  BP: 143/91 (16 Oct 2018 06:57) (136/82 - 143/91)  BP(mean): --  RR: 16 (16 Oct 2018 06:57) (16 - 19)  SpO2: 100% (16 Oct 2018 06:57) (98% - 100%)    CAPILLARY BLOOD GLUCOSE      POCT Blood Glucose.: 233 mg/dL (16 Oct 2018 12:45)  POCT Blood Glucose.: 255 mg/dL (16 Oct 2018 09:05)  POCT Blood Glucose.: 238 mg/dL (15 Oct 2018 22:13)  POCT Blood Glucose.: 262 mg/dL (15 Oct 2018 17:46)  POCT Blood Glucose.: 330 mg/dL (15 Oct 2018 13:11)        PHYSICAL EXAM:  GENERAL: NAD, well-developed  HEAD:  Atraumatic, Normocephalic  EYES: EOMI, PERRLA, conjunctiva and sclera clear  NECK: Supple, No JVD  CHEST/LUNG: Clear to auscultation bilaterally; No wheeze  HEART: s1 s2, regular rhythm and rate   ABDOMEN: Soft, Nontender, Nondistended; Bowel sounds present  EXTREMITIES:  2+ Peripheral Pulses, No clubbing, cyanosis, or edema  PSYCH: AAOx3, calm   NEUROLOGY: Left side weaker than right, but strengh 5/5 through muscles groups .+subjective numbness on left.  SKIN: No rashes or lesions    LABS:                                        15.9   14.20 )-----------( 334      ( 16 Oct 2018 06:54 )             47.8   10-16    135  |  98  |  20  ----------------------------<  258<H>  4.2   |  21<L>  |  0.69    Ca    9.8      16 Oct 2018 06:54  Phos  3.8     10-15  Mg     2.2     10-16              RADIOLOGY & ADDITIONAL TESTS:    Imaging Personally Reviewed:    Consultant(s) Notes Reviewed:  PM&R    Care Discussed with Consultants/Other Providers: Neurology attending

## 2018-10-16 NOTE — PROGRESS NOTE ADULT - SUBJECTIVE AND OBJECTIVE BOX
Chief Complaint: t2dm    History:  no n/v tolerates po, no hypoglycemia..learned vial and syringe with family    MEDICATIONS  (STANDING):  aspirin enteric coated 81 milliGRAM(s) Oral daily  atorvastatin 80 milliGRAM(s) Oral at bedtime  clopidogrel Tablet 75 milliGRAM(s) Oral daily  dextrose 5%. 1000 milliLiter(s) (50 mL/Hr) IV Continuous <Continuous>  dextrose 50% Injectable 12.5 Gram(s) IV Push once  dextrose 50% Injectable 25 Gram(s) IV Push once  dextrose 50% Injectable 25 Gram(s) IV Push once  heparin  Injectable 5000 Unit(s) SubCutaneous every 12 hours  influenza   Vaccine 0.5 milliLiter(s) IntraMuscular once  insulin glargine Injectable (LANTUS) 15 Unit(s) SubCutaneous at bedtime  insulin lispro (HumaLOG) corrective regimen sliding scale   SubCutaneous three times a day before meals  insulin lispro (HumaLOG) corrective regimen sliding scale   SubCutaneous at bedtime  insulin lispro Injectable (HumaLOG) 5 Unit(s) SubCutaneous three times a day before meals    MEDICATIONS  (PRN):  dextrose 40% Gel 15 Gram(s) Oral once PRN Blood Glucose LESS THAN 70 milliGRAM(s)/deciliter  glucagon  Injectable 1 milliGRAM(s) IntraMuscular once PRN Glucose LESS THAN 70 milligrams/deciliter      Allergies    No Known Allergies    Intolerances      Review of Systems:  Constitutional: No fever  Eyes: No blurry vision      ALL OTHER SYSTEMS REVIEWED AND NEGATIVE        PHYSICAL EXAM:  Vital Signs Last 24 Hrs  T(C): 36.3 (16 Oct 2018 15:29), Max: 37.1 (15 Oct 2018 22:15)  T(F): 97.3 (16 Oct 2018 15:29), Max: 98.7 (15 Oct 2018 22:15)  HR: 81 (16 Oct 2018 15:29) (76 - 82)  BP: 141/77 (16 Oct 2018 15:29) (141/77 - 143/91)  BP(mean): --  RR: 18 (16 Oct 2018 15:29) (16 - 18)  SpO2: 100% (16 Oct 2018 15:29) (98% - 100%)  GENERAL: NAD, well-developed  GI: Soft, nontender, non distended  SKIN: Dry, intact, No rashes or lesions  PSYCH: Alert and oriented x 3, normal affect, normal mood      CAPILLARY BLOOD GLUCOSE      POCT Blood Glucose.: 233 mg/dL (16 Oct 2018 12:45)  POCT Blood Glucose.: 255 mg/dL (16 Oct 2018 09:05)  POCT Blood Glucose.: 238 mg/dL (15 Oct 2018 22:13)  POCT Blood Glucose.: 262 mg/dL (15 Oct 2018 17:46)    POCT Blood Glucose.: 330 mg/dL (15 Oct 2018 13:11)  POCT Blood Glucose.: 236 mg/dL (15 Oct 2018 08:42)  POCT Blood Glucose.: 257 mg/dL (14 Oct 2018 22:07)  POCT Blood Glucose.: 258 mg/dL (14 Oct 2018 18:54)  POCT Blood Glucose.: 225 mg/dL (14 Oct 2018 17:45)      10-15    134<L>  |  99  |  16  ----------------------------<  260<H>  4.0   |  20<L>  |  0.62    EGFR if : 130  EGFR if non : 113    Ca    9.5      10-15  Mg     2.1     10-15  Phos  3.8     10-15            Thyroid Function Tests:  10-11 @ 13:51 TSH 1.70 FreeT4 -- T3 -- Anti TPO -- Anti Thyroglobulin Ab -- TSI --      Hemoglobin A1C, Whole Blood: 10.1 % <H> [4.0 - 5.6] (10-11-18 @ 13:51)

## 2018-10-16 NOTE — PROGRESS NOTE ADULT - ATTENDING COMMENTS
Stable for discharge pending acceptance to acute rehab vs. denial. D/C planning 35 minutes.  Wrote note for work.

## 2018-10-16 NOTE — DIETITIAN INITIAL EVALUATION ADULT. - OTHER INFO
Patient seen for nutrition consult. Patient seen for nutrition consult. Per chart: 53 year old male with history of DM presented with the complaint of left sided UE/LE numbness admitted for acute CVA. Patient reports having good appetite. Patient denies any nausea/vomiting/diarrhea/constipation or difficulty chewing and swallowing. Patient on Dysphagia 3 Soft- Nectar consistency diet, however water pitched at bedside, states he is tolerating thin liquids and denies any coughing while drinking. Would recommend MBS or Bedside side swallow evaluation to reevaluate diet consistency. Patient denies any recent weight change. Usual body weight 155 to 165 pounds. Current weight 165 pounds. Patient monitors blood glucose levels 2 to 3 times a day after meals and blood glucose levels are between 200 and 280 mg/dL

## 2018-10-16 NOTE — PROGRESS NOTE ADULT - PROBLEM SELECTOR PLAN 1
target bg 100-180 mg/dl  increase lantus to 25 units sq qhs  increase humalog to 9 units sq tid ac  c/w current crrection scale fro now    Dc planning- Anticipate Novolin 70 / 30 vials and syringes. DOSES TBD Please send scripts t [facility].  Patient also needs new glucomter, test strips, lancets and syringes.  Family taught.

## 2018-10-16 NOTE — DIETITIAN INITIAL EVALUATION ADULT. - PERTINENT MEDS FT
MEDICATIONS  (STANDING):  aspirin enteric coated 81 milliGRAM(s) Oral daily  atorvastatin 80 milliGRAM(s) Oral at bedtime  clopidogrel Tablet 75 milliGRAM(s) Oral daily  dextrose 5%. 1000 milliLiter(s) (50 mL/Hr) IV Continuous <Continuous>  dextrose 50% Injectable 12.5 Gram(s) IV Push once  dextrose 50% Injectable 25 Gram(s) IV Push once  dextrose 50% Injectable 25 Gram(s) IV Push once  heparin  Injectable 5000 Unit(s) SubCutaneous every 12 hours  influenza   Vaccine 0.5 milliLiter(s) IntraMuscular once  insulin glargine Injectable (LANTUS) 25 Unit(s) SubCutaneous at bedtime  insulin lispro (HumaLOG) corrective regimen sliding scale   SubCutaneous three times a day before meals  insulin lispro (HumaLOG) corrective regimen sliding scale   SubCutaneous at bedtime  insulin lispro Injectable (HumaLOG) 9 Unit(s) SubCutaneous three times a day before meals    MEDICATIONS  (PRN):  dextrose 40% Gel 15 Gram(s) Oral once PRN Blood Glucose LESS THAN 70 milliGRAM(s)/deciliter  glucagon  Injectable 1 milliGRAM(s) IntraMuscular once PRN Glucose LESS THAN 70 milligrams/deciliter

## 2018-10-16 NOTE — DIETITIAN INITIAL EVALUATION ADULT. - ENERGY NEEDS
Ht: 64 inches Wt: 157.6 pounds. BMI: 27.1 kg/m2  Skin: intact no pressure injuries noted   No edema noted

## 2018-10-16 NOTE — PROGRESS NOTE ADULT - SUBJECTIVE AND OBJECTIVE BOX
HPI:  52 y/o right handed [place] Man with PMH of DM presented with the complaint of left sided UE/LE numbness and weakness with dizziness and headache .  As per the patient he was in his usual state of health and was on his way to go to work this morning and then developed left sided upper and lower extremity weakness and numbness. At work patient developed frontal and parietal headache with associated dizziness, denied any photophobia or phonophobia. Patient stated that he took a baby aspirin when the headache started. Patient stated that when he walked he felt like he was about to fall due to the dizziness. Patient also stated that 2/2 to the dizziness he had 20 episodes of NBNB vomiting today prior to coming to the ER. Patient stated that when he was walking he felt like he was dragging his left foot. Patient denied any slurred speech, changes in vision, difficulty swallowing. Patient denied any CP, fevers, SOB, D/C, abdominal pain, dysuria, melena, hematochezia, recent travel, sick contact, pleuritic or positional chest pain.     On ED admission EKG revealed  NSR at a rate of 83 with TWI in leads AVF and V5-V6 and QTc of 425, CE x 1: Negative, WBC: 17.50, Gluc: 344, Protein: 88, Alb: 5.1, Lactate: 3.4. Head CT: Age-indeterminate lacunar infarcts involve the right thalamus and right lentiform nucleus. Chronic lacunar infarcts and mild chronic white matter changes are noted as described. If the patient has a new and persistent  neurologic deficit, consider brain MRI imaging follow-up. CTA NECK: Mild to moderate stenosis involves origin of the right internal carotid artery. Minimal stenosis involves origin of the left internal carotid artery. No evidence for vertebral artery stenosis. Patient was seen by neurology and their recommendation noted. Patient seen by SLP and put on modified diet. 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    REVIEW OF SYSTEMS  Constitutional: No fever, No weight loss, No fatigue  HEENT: + mild dysphagia,  +/-Headache,   Pulm: No cough, No SOB  Cardio: No chest pain, No palpitations  GI:  No abdominal pain, LBM few days ago,  No incontinence  : No dysuria, No frequency, No retention, No incontinence  Neuro:  +loss of strength, No sensation defictis   Skin: No itching, No rash, No lesions   Endo: No Diabetic symptoms, No Thyroid symptoms  MSK: No joint pain, No joint swelling, No muscle pain   Psych:  No depression, No anxiety      CURRENT FUNCTIONAL STATUS  min a     Vital Signs Last 24 Hrs  T(C): 36.5 (16 Oct 2018 06:57), Max: 37.1 (15 Oct 2018 22:15)  T(F): 97.7 (16 Oct 2018 06:57), Max: 98.7 (15 Oct 2018 22:15)  HR: 76 (16 Oct 2018 06:57) (76 - 82)  BP: 143/91 (16 Oct 2018 06:57) (142/88 - 143/91)  BP(mean): --  RR: 16 (16 Oct 2018 06:57) (16 - 18)  SpO2: 100% (16 Oct 2018 06:57) (98% - 100%)  ----------------------------------------------------------------------------------------    PHYSICAL EXAM  Constitutional: NAD, Resting Comfortable  Neck: Supple, FROM,   Chest: No Respiratory Distress  Ext: No C/C/E, Pulses 2+ throughout, No calf tenderness   Neuro Exam      Cognitive: AAO x 3     Communication:  Fluent, No dysarthria      CN II - XII  intact         Coordination: FTN impaired on left     Motor             LEFT    UE:  SF 4+/5, EF 4+/5, EE 4+/5, WE 4+/5, Hand intrinsic 4+/5,  mild diminished             RIGHT UE:  SF 5/5, EF 5/5, EE 5/5, WE 5/5, Hand Intrinsic 5/5,  wnl             LEFT    LE:  HF 4/5, KE 5/5, DF 5/5, EHL 5/5,  PF 5/5             RIGHT LE:  HF 5/5, KE 5/5, DF 5/5, EHL 5/5, PF 5/5        Sensory: Intact to light touch     Tone: Normal     Reflexes: DTR Intact,  Negative Hoffmans, Negative Babinski      Gait & Balance: ataxic gait   Psychiatric: Affect WNL      ASSESSMENT/PLAN  53 year-old Man with a PMH of DM found to have 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      Recommend   - Disposition:  ACUTE inpatient rehabilitation, however barrier of no insurance  f- c/w PT for Bed Mobility, Transfers, Ambulation with AD   - c/w OT for ADLs  - Turn Q2hrs while in bed, OOB to chair when possible to prevent Pressure Injury   - c/w modified diet for dysphagia

## 2018-10-16 NOTE — PROGRESS NOTE ADULT - PROBLEM SELECTOR PLAN 3
A1C 10, currently still uncontrolled in hospital   appreciate endo recs: increased lantus to 28 units and humolog to 9 units. d/C insulin to be determined with endocrine assistance.

## 2018-10-16 NOTE — DIETITIAN INITIAL EVALUATION ADULT. - NS AS NUTRI INTERV ED CONTENT
Patient receptive to consistent carbohydrate diet. Verbal and written education provided to patient. Education reviewed sources of carbohydrates, portion sizes and pairing carbohydrates with protein to help with glycemic control. Reviewed DASH and food sources that tend to be high in sodium. Patient made aware RDN to remain available.

## 2018-10-17 PROBLEM — Z00.00 ENCOUNTER FOR PREVENTIVE HEALTH EXAMINATION: Status: ACTIVE | Noted: 2018-10-17

## 2018-10-17 LAB
BUN SERPL-MCNC: 16 MG/DL — SIGNIFICANT CHANGE UP (ref 7–23)
CALCIUM SERPL-MCNC: 9.6 MG/DL — SIGNIFICANT CHANGE UP (ref 8.4–10.5)
CHLORIDE SERPL-SCNC: 100 MMOL/L — SIGNIFICANT CHANGE UP (ref 98–107)
CO2 SERPL-SCNC: 24 MMOL/L — SIGNIFICANT CHANGE UP (ref 22–31)
CREAT SERPL-MCNC: 0.68 MG/DL — SIGNIFICANT CHANGE UP (ref 0.5–1.3)
GLUCOSE SERPL-MCNC: 163 MG/DL — HIGH (ref 70–99)
HCT VFR BLD CALC: 46.8 % — SIGNIFICANT CHANGE UP (ref 39–50)
HGB BLD-MCNC: 15.3 G/DL — SIGNIFICANT CHANGE UP (ref 13–17)
MAGNESIUM SERPL-MCNC: 2.2 MG/DL — SIGNIFICANT CHANGE UP (ref 1.6–2.6)
MCHC RBC-ENTMCNC: 28.4 PG — SIGNIFICANT CHANGE UP (ref 27–34)
MCHC RBC-ENTMCNC: 32.7 % — SIGNIFICANT CHANGE UP (ref 32–36)
MCV RBC AUTO: 86.8 FL — SIGNIFICANT CHANGE UP (ref 80–100)
NRBC # FLD: 0 — SIGNIFICANT CHANGE UP
PLATELET # BLD AUTO: 321 K/UL — SIGNIFICANT CHANGE UP (ref 150–400)
PMV BLD: 10.8 FL — SIGNIFICANT CHANGE UP (ref 7–13)
POTASSIUM SERPL-MCNC: 3.7 MMOL/L — SIGNIFICANT CHANGE UP (ref 3.5–5.3)
POTASSIUM SERPL-SCNC: 3.7 MMOL/L — SIGNIFICANT CHANGE UP (ref 3.5–5.3)
RBC # BLD: 5.39 M/UL — SIGNIFICANT CHANGE UP (ref 4.2–5.8)
RBC # FLD: 12.7 % — SIGNIFICANT CHANGE UP (ref 10.3–14.5)
SODIUM SERPL-SCNC: 138 MMOL/L — SIGNIFICANT CHANGE UP (ref 135–145)
WBC # BLD: 12.52 K/UL — HIGH (ref 3.8–10.5)
WBC # FLD AUTO: 12.52 K/UL — HIGH (ref 3.8–10.5)

## 2018-10-17 PROCEDURE — 99232 SBSQ HOSP IP/OBS MODERATE 35: CPT

## 2018-10-17 PROCEDURE — 99232 SBSQ HOSP IP/OBS MODERATE 35: CPT | Mod: GC

## 2018-10-17 RX ORDER — INSULIN GLARGINE 100 [IU]/ML
26 INJECTION, SOLUTION SUBCUTANEOUS AT BEDTIME
Qty: 0 | Refills: 0 | Status: DISCONTINUED | OUTPATIENT
Start: 2018-10-17 | End: 2018-10-18

## 2018-10-17 RX ORDER — INSULIN LISPRO 100/ML
11 VIAL (ML) SUBCUTANEOUS
Qty: 0 | Refills: 0 | Status: DISCONTINUED | OUTPATIENT
Start: 2018-10-17 | End: 2018-10-18

## 2018-10-17 RX ORDER — INSULIN LISPRO 100/ML
11 VIAL (ML) SUBCUTANEOUS
Qty: 0 | Refills: 0 | Status: DISCONTINUED | OUTPATIENT
Start: 2018-10-17 | End: 2018-10-17

## 2018-10-17 RX ADMIN — INSULIN GLARGINE 26 UNIT(S): 100 INJECTION, SOLUTION SUBCUTANEOUS at 21:29

## 2018-10-17 RX ADMIN — HEPARIN SODIUM 5000 UNIT(S): 5000 INJECTION INTRAVENOUS; SUBCUTANEOUS at 17:42

## 2018-10-17 RX ADMIN — Medication 9 UNIT(S): at 12:58

## 2018-10-17 RX ADMIN — Medication 8: at 12:59

## 2018-10-17 RX ADMIN — Medication 11 UNIT(S): at 17:42

## 2018-10-17 RX ADMIN — HEPARIN SODIUM 5000 UNIT(S): 5000 INJECTION INTRAVENOUS; SUBCUTANEOUS at 05:18

## 2018-10-17 RX ADMIN — Medication 2: at 08:51

## 2018-10-17 RX ADMIN — Medication 9 UNIT(S): at 08:51

## 2018-10-17 RX ADMIN — ATORVASTATIN CALCIUM 80 MILLIGRAM(S): 80 TABLET, FILM COATED ORAL at 21:19

## 2018-10-17 RX ADMIN — CLOPIDOGREL BISULFATE 75 MILLIGRAM(S): 75 TABLET, FILM COATED ORAL at 12:59

## 2018-10-17 RX ADMIN — Medication 81 MILLIGRAM(S): at 12:59

## 2018-10-17 NOTE — SWALLOW BEDSIDE ASSESSMENT ADULT - ASR SWALLOW LINGUAL MOBILITY
within functional limits
within functional limits
I have personally performed a face to face diagnostic evaluation on this patient. I have reviewed the PA note and agree with the history, exam, and plan of care, except as noted.

## 2018-10-17 NOTE — PROGRESS NOTE ADULT - SUBJECTIVE AND OBJECTIVE BOX
Chief Complaint: t2dm    History:  no n/v tolerates po, no hypoglycemia..learned vial and syringe with family,    MEDICATIONS  (STANDING):  aspirin enteric coated 81 milliGRAM(s) Oral daily  atorvastatin 80 milliGRAM(s) Oral at bedtime  clopidogrel Tablet 75 milliGRAM(s) Oral daily  dextrose 5%. 1000 milliLiter(s) (50 mL/Hr) IV Continuous <Continuous>  dextrose 50% Injectable 12.5 Gram(s) IV Push once  dextrose 50% Injectable 25 Gram(s) IV Push once  dextrose 50% Injectable 25 Gram(s) IV Push once  heparin  Injectable 5000 Unit(s) SubCutaneous every 12 hours  influenza   Vaccine 0.5 milliLiter(s) IntraMuscular once  insulin glargine Injectable (LANTUS) 15 Unit(s) SubCutaneous at bedtime  insulin lispro (HumaLOG) corrective regimen sliding scale   SubCutaneous three times a day before meals  insulin lispro (HumaLOG) corrective regimen sliding scale   SubCutaneous at bedtime  insulin lispro Injectable (HumaLOG) 5 Unit(s) SubCutaneous three times a day before meals    MEDICATIONS  (PRN):  dextrose 40% Gel 15 Gram(s) Oral once PRN Blood Glucose LESS THAN 70 milliGRAM(s)/deciliter  glucagon  Injectable 1 milliGRAM(s) IntraMuscular once PRN Glucose LESS THAN 70 milligrams/deciliter      Allergies    No Known Allergies    Intolerances      Review of Systems:  Constitutional: No fever  Eyes: No blurry vision      ALL OTHER SYSTEMS REVIEWED AND NEGATIVE        PHYSICAL EXAM:  Vital Signs Last 24 Hrs  T(C): 36.7 (17 Oct 2018 05:16), Max: 36.7 (16 Oct 2018 21:23)  T(F): 98.1 (17 Oct 2018 05:16), Max: 98.1 (17 Oct 2018 05:16)  HR: 75 (17 Oct 2018 12:57) (75 - 79)  BP: 120/76 (17 Oct 2018 12:57) (120/76 - 150/73)  BP(mean): --  RR: 15 (17 Oct 2018 12:57) (15 - 18)  SpO2: 98% (17 Oct 2018 12:57) (98% - 100%)  GENERAL: NAD, well-developed  GI: Soft, nontender, non distended  SKIN: Dry, intact, No rashes or lesions  PSYCH: Alert and oriented x 3, normal affect, normal mood    CAPILLARY BLOOD GLUCOSE      POCT Blood Glucose.: 324 mg/dL (17 Oct 2018 12:13)  POCT Blood Glucose.: 170 mg/dL (17 Oct 2018 08:36)  POCT Blood Glucose.: 204 mg/dL (16 Oct 2018 21:28)  POCT Blood Glucose.: 247 mg/dL (16 Oct 2018 17:33)    POCT Blood Glucose.: 233 mg/dL (16 Oct 2018 12:45)  POCT Blood Glucose.: 255 mg/dL (16 Oct 2018 09:05)  POCT Blood Glucose.: 238 mg/dL (15 Oct 2018 22:13)  POCT Blood Glucose.: 262 mg/dL (15 Oct 2018 17:46)    POCT Blood Glucose.: 330 mg/dL (15 Oct 2018 13:11)  POCT Blood Glucose.: 236 mg/dL (15 Oct 2018 08:42)  POCT Blood Glucose.: 257 mg/dL (14 Oct 2018 22:07)  POCT Blood Glucose.: 258 mg/dL (14 Oct 2018 18:54)  POCT Blood Glucose.: 225 mg/dL (14 Oct 2018 17:45)      10-15    134<L>  |  99  |  16  ----------------------------<  260<H>  4.0   |  20<L>  |  0.62    EGFR if : 130  EGFR if non : 113    Ca    9.5      10-15  Mg     2.1     10-15  Phos  3.8     10-15            Thyroid Function Tests:  10-11 @ 13:51 TSH 1.70 FreeT4 -- T3 -- Anti TPO -- Anti Thyroglobulin Ab -- TSI --      Hemoglobin A1C, Whole Blood: 10.1 % <H> [4.0 - 5.6] (10-11-18 @ 13:51)

## 2018-10-17 NOTE — PROGRESS NOTE ADULT - SUBJECTIVE AND OBJECTIVE BOX
HPI:  52 y/o right handed [place] Man with PMH of DM presented with the complaint of left sided UE/LE numbness and weakness with dizziness and headache .  As per the patient he was in his usual state of health and was on his way to go to work this morning and then developed left sided upper and lower extremity weakness and numbness. At work patient developed frontal and parietal headache with associated dizziness, denied any photophobia or phonophobia. Patient stated that he took a baby aspirin when the headache started. Patient stated that when he walked he felt like he was about to fall due to the dizziness. Patient also stated that 2/2 to the dizziness he had 20 episodes of NBNB vomiting today prior to coming to the ER. Patient stated that when he was walking he felt like he was dragging his left foot. Patient denied any slurred speech, changes in vision, difficulty swallowing. Patient denied any CP, fevers, SOB, D/C, abdominal pain, dysuria, melena, hematochezia, recent travel, sick contact, pleuritic or positional chest pain.     On ED admission EKG revealed  NSR at a rate of 83 with TWI in leads AVF and V5-V6 and QTc of 425, CE x 1: Negative, WBC: 17.50, Gluc: 344, Protein: 88, Alb: 5.1, Lactate: 3.4. Head CT: Age-indeterminate lacunar infarcts involve the right thalamus and right lentiform nucleus. Chronic lacunar infarcts and mild chronic white matter changes are noted as described. If the patient has a new and persistent  neurologic deficit, consider brain MRI imaging follow-up. CTA NECK: Mild to moderate stenosis involves origin of the right internal carotid artery. Minimal stenosis involves origin of the left internal carotid artery. No evidence for vertebral artery stenosis. Patient was seen by neurology and their recommendation noted. Patient seen by SLP and put on modified diet. 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  Doing well, participated with PT, still requires 2 person assist to waln     REVIEW OF SYSTEMS  Constitutional: No fever, No weight loss, No fatigue  HEENT: + mild dysphagia,  +/-Headache,   Pulm: No cough, No SOB  Cardio: No chest pain, No palpitations  GI:  No abdominal pain, LBM few days ago,  No incontinence  : No dysuria, No frequency, No retention, No incontinence  Neuro:  +loss of strength, No sensation defictis   Skin: No itching, No rash, No lesions   Endo: No Diabetic symptoms, No Thyroid symptoms  MSK: No joint pain, No joint swelling, No muscle pain   Psych:  No depression, No anxiety      CURRENT FUNCTIONAL STATUS  min a   Vital Signs Last 24 Hrs  T(C): 36.7 (17 Oct 2018 05:16), Max: 36.7 (16 Oct 2018 21:23)  T(F): 98.1 (17 Oct 2018 05:16), Max: 98.1 (17 Oct 2018 05:16)  HR: 75 (17 Oct 2018 12:57) (75 - 79)  BP: 120/76 (17 Oct 2018 12:57) (120/76 - 150/73)  BP(mean): --  RR: 15 (17 Oct 2018 12:57) (15 - 18)  SpO2: 98% (17 Oct 2018 12:57) (98% - 100%)                          15.3   12.52 )-----------( 321      ( 17 Oct 2018 05:44 )             46.8     ----------------------------------------------------------------------------------------    PHYSICAL EXAM  Constitutional: NAD, Resting Comfortable  Neck: Supple, FROM,   Chest: No Respiratory Distress  Ext: No C/C/E, Pulses 2+ throughout, No calf tenderness   Neuro Exam      Cognitive: AAO x 3     Communication:  Fluent, No dysarthria      CN II - XII  intact         Coordination: FTN impaired on left     Motor             LEFT    UE:  SF 4+/5, EF 4+/5, EE 4+/5, WE 4+/5, Hand intrinsic 4+/5,  mild diminished             RIGHT UE:  SF 5/5, EF 5/5, EE 5/5, WE 5/5, Hand Intrinsic 5/5,  wnl             LEFT    LE:  HF 4/5, KE 5/5, DF 5/5, EHL 5/5,  PF 5/5             RIGHT LE:  HF 5/5, KE 5/5, DF 5/5, EHL 5/5, PF 5/5        Sensory: Intact to light touch     Tone: Normal     Reflexes: DTR Intact,  Negative Hoffmans, Negative Babinski      Gait & Balance: ataxic gait   Psychiatric: Affect WNL      ASSESSMENT/PLAN  53 year-old Man with a PMH of DM found to have 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      Recommend   - Disposition:  ACUTE inpatient rehabilitation, however barrier of no insurance  f- c/w PT for Bed Mobility, Transfers, Ambulation with AD   - c/w OT for ADLs  -leukocytosis-reactive, asymptomatic   - c/w modified diet for dysphagia

## 2018-10-17 NOTE — SWALLOW BEDSIDE ASSESSMENT ADULT - ASR SWALLOW ASPIRATION MONITOR
change of breathing pattern/cough/fever/throat clearing/gurgly voice/oral hygiene/position upright (90Y)/upper respiratory infection/pneumonia
fever/pneumonia/throat clearing/upper respiratory infection/oral hygiene/cough/change of breathing pattern/position upright (90Y)/gurgly voice

## 2018-10-17 NOTE — PROGRESS NOTE ADULT - ATTENDING COMMENTS
Stable for discharge pending acceptance to acute rehab vs. denial. D/C planning 35 minutes.  Wrote note for work. Stable for discharge pending acceptance to acute rehab vs. denial. D/C planning 35 minutes.

## 2018-10-17 NOTE — SWALLOW BEDSIDE ASSESSMENT ADULT - SWALLOW EVAL: DIAGNOSIS
Patient demonstrates symptoms consistent with mild oropharyngeal dysphagia characterized by increased mastication time, delayed bolus collection and delayed anterior-posterior transfer of regular solids. Weak labial seal on L side with subsequent trace to mild anterior loss of thin liquid noted. Adequate oral containment and functional bolus collection and transfer noted for puree, soft solids and nectar-thick liquids. The pharyngeal stage is characterized by a timely pharyngeal trigger with reduced hyolaryngeal excursion suspected upon digital palpation. Throat clear noted post swallow with thin liquid suggestive of laryngeal penetration vs aspiration. No overt clinical s/s noted with puree, solids and nectar-thick liquids.
Patient demonstrates a functional oral stage of swallow and suspected mild pharyngeal dysphagia characterized by functional bolus collection, adequate anterior-posterior transfer, a timely pharyngeal trigger and suspect reduced hyolaryngeal excursion upon digital palpation. Inconsistent throat clear noted after the swallow (on 2/5 trials) of thin liquids suggesting laryngeal penetration vs aspiration. Vocal quality noted to remain clear. No overt, clinical s/s noted with puree, solids and nectar-thick liquids.
Yes

## 2018-10-17 NOTE — PHARMACOTHERAPY INTERVENTION NOTE - COMMENTS
S/w endocrine consult and recommended adding on lipid panel to patient for tomorrow's labs (baseline unknown).

## 2018-10-17 NOTE — PROGRESS NOTE ADULT - SUBJECTIVE AND OBJECTIVE BOX
Patient is a 53y old  Male who presents with a chief complaint of Left sided numbness and weakness with headache (14 Oct 2018 14:25)      SUBJECTIVE / OVERNIGHT EVENTS:  No acute events overnight. Patient offers no complaints. Still wtih numbness on left side.     MEDICATIONS  (STANDING):  aspirin enteric coated 81 milliGRAM(s) Oral daily  atorvastatin 80 milliGRAM(s) Oral at bedtime  clopidogrel Tablet 75 milliGRAM(s) Oral daily  dextrose 5%. 1000 milliLiter(s) (50 mL/Hr) IV Continuous <Continuous>  dextrose 50% Injectable 12.5 Gram(s) IV Push once  dextrose 50% Injectable 25 Gram(s) IV Push once  dextrose 50% Injectable 25 Gram(s) IV Push once  heparin  Injectable 5000 Unit(s) SubCutaneous every 12 hours  influenza   Vaccine 0.5 milliLiter(s) IntraMuscular once  insulin glargine Injectable (LANTUS) 15 Unit(s) SubCutaneous at bedtime  insulin lispro (HumaLOG) corrective regimen sliding scale   SubCutaneous three times a day before meals  insulin lispro (HumaLOG) corrective regimen sliding scale   SubCutaneous at bedtime  insulin lispro Injectable (HumaLOG) 5 Unit(s) SubCutaneous three times a day before meals    MEDICATIONS  (PRN):  dextrose 40% Gel 15 Gram(s) Oral once PRN Blood Glucose LESS THAN 70 milliGRAM(s)/deciliter  glucagon  Injectable 1 milliGRAM(s) IntraMuscular once PRN Glucose LESS THAN 70 milligrams/deciliter    Vital Signs Last 24 Hrs  T(C): 36.7 (17 Oct 2018 05:16), Max: 36.7 (16 Oct 2018 21:23)  T(F): 98.1 (17 Oct 2018 05:16), Max: 98.1 (17 Oct 2018 05:16)  HR: 75 (17 Oct 2018 12:57) (75 - 81)  BP: 120/76 (17 Oct 2018 12:57) (120/76 - 150/73)  BP(mean): --  RR: 15 (17 Oct 2018 12:57) (15 - 18)  SpO2: 98% (17 Oct 2018 12:57) (98% - 100%)    CAPILLARY BLOOD GLUCOSE      POCT Blood Glucose.: 324 mg/dL (17 Oct 2018 12:13)  POCT Blood Glucose.: 170 mg/dL (17 Oct 2018 08:36)  POCT Blood Glucose.: 204 mg/dL (16 Oct 2018 21:28)  POCT Blood Glucose.: 247 mg/dL (16 Oct 2018 17:33)        PHYSICAL EXAM:  GENERAL: NAD, well-developed  HEAD:  Atraumatic, Normocephalic  EYES: EOMI, PERRLA, conjunctiva and sclera clear  NECK: Supple, No JVD  CHEST/LUNG: Clear to auscultation bilaterally; No wheeze  HEART: s1 s2, regular rhythm and rate   ABDOMEN: Soft, Nontender, Nondistended; Bowel sounds present  EXTREMITIES:  2+ Peripheral Pulses, No clubbing, cyanosis, or edema  PSYCH: AAOx3, calm   NEUROLOGY: Left side weaker than right, but strengh 5/5 through muscles groups .+subjective numbness on left.  SKIN: No rashes or lesions    LABS:                                        15.9   14.20 )-----------( 334      ( 16 Oct 2018 06:54 )             47.8   10-16    135  |  98  |  20  ----------------------------<  258<H>  4.2   |  21<L>  |  0.69    Ca    9.8      16 Oct 2018 06:54  Phos  3.8     10-15  Mg     2.2     10-16              RADIOLOGY & ADDITIONAL TESTS:    Imaging Personally Reviewed:    Consultant(s) Notes Reviewed:  PM&R    Care Discussed with Consultants/Other Providers: Neurology attending

## 2018-10-17 NOTE — PROGRESS NOTE ADULT - PROBLEM SELECTOR PLAN 1
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   -for Acute rehab pending insurance, social work aware.

## 2018-10-17 NOTE — SWALLOW BEDSIDE ASSESSMENT ADULT - SWALLOW EVAL: RECOMMENDED DIET
Soft Solids with Nectar-Thick Liquids
1) Continue Soft Solids with Nectar-Thick Liquids; 2) Cinesophagram to objectively assess the swallow mechanism, r/o aspiration and determine tolerance for diet texture upgrade

## 2018-10-17 NOTE — PROGRESS NOTE ADULT - PROBLEM SELECTOR PLAN 3
A1C 10, currently still uncontrolled in hospital   appreciate endo recs: increased lantus to 28 units and humolog to 9 units. d/C insulin to be determined with endocrine assistance. A1C 10, currently still uncontrolled in hospital but improving  appreciate endo recs: would increase from lantus 30, humolog 10 units TID.  -will be discharged on 70/30 with dosage to be determined A1C 10, currently still uncontrolled in hospital but improving  appreciate endo recs: would increase from lantus 26, humolog 11 units TID.  -will be discharged on 70/30 with dosage to be determined

## 2018-10-17 NOTE — SWALLOW BEDSIDE ASSESSMENT ADULT - SWALLOW EVAL: CRITERIA FOR SKILLED INTERVENTION MET
demonstrates skilled criteria for swallowing intervention/to assess for possible diet advancement
demonstrates skilled criteria for swallowing intervention

## 2018-10-17 NOTE — SWALLOW BEDSIDE ASSESSMENT ADULT - PHARYNGEAL PHASE
Decreased laryngeal elevation Inconsistent throat clear post swallow (on 2/5 trials)/Decreased laryngeal elevation

## 2018-10-17 NOTE — SWALLOW BEDSIDE ASSESSMENT ADULT - COMMENTS
Patient is a 53 year-old male with PMHx of DM presented with the complaint of left sided UE/LE numbness and weakness with dizziness and headache. MRI Brain 10/11/18 shows "An acute infarct involves the right pontomedullary junction, without hemorrhagic transformation." Patient is a 53 year-old male with PMHx of DM presented with the complaint of left sided UE/LE numbness and weakness with dizziness and headache. MRI Brain 10/11/18 shows "An acute infarct involves the right pontomedullary junction, without hemorrhagic transformation."    Patient is known to this service from initial bedside swallow evaluation on 10/11/18 (see chart for full report). Patient was received alert and cooperative, sitting upright in chair in this AM. Able to follow simple directives and communicate basic wants/needs upon informal screening. Recommendations discussed with patient and Tele PA Tommie).

## 2018-10-17 NOTE — SWALLOW BEDSIDE ASSESSMENT ADULT - SWALLOW EVAL: RECOMMENDED FEEDING/EATING TECHNIQUES
allow for swallow between intakes/no straws/position upright (90 degrees)/small sips/bites/maintain upright posture during/after eating for 30 mins
position upright (90 degrees)/no straws/allow for swallow between intakes/crush medication (when feasible)/small sips/bites/maintain upright posture during/after eating for 30 mins

## 2018-10-17 NOTE — PROGRESS NOTE ADULT - PROBLEM SELECTOR PLAN 1
target bg 100-180 mg/dl  increase lantus to 25 units sq qhs  increase humalog to 11 units sq tid ac  c/w current crrection scale fro now    Dc planning- Anticipate Novolin 70 / 30 vials and syringes. DOSES TBD Please send scripts t [facility].  Patient also needs new glucomter, test strips, lancets and syringes.  Family taught. target bg 100-180 mg/dl  c/w lantus to 25 units sq qhs as patient was within target range  increase humalog to 11 units sq tid ac  c/w current crrection scale fro now    Dc planning- Anticipate Novolin 70 / 30 vials and syringes. DOSES TBD Please send scripts t [facility].  Patient also needs new glucomter, test strips, lancets and syringes.  Family taught.    Appointments scheduled at clinic for medicine on 10/22 at 130pm and 10/25 with endocrine at 9am  [phone]

## 2018-10-18 ENCOUNTER — TRANSCRIPTION ENCOUNTER (OUTPATIENT)
Age: 53
End: 2018-10-18

## 2018-10-18 VITALS
HEART RATE: 80 BPM | TEMPERATURE: 98 F | OXYGEN SATURATION: 100 % | RESPIRATION RATE: 19 BRPM | DIASTOLIC BLOOD PRESSURE: 77 MMHG | SYSTOLIC BLOOD PRESSURE: 123 MMHG

## 2018-10-18 LAB
BASOPHILS # BLD AUTO: 0.06 K/UL — SIGNIFICANT CHANGE UP (ref 0–0.2)
BASOPHILS NFR BLD AUTO: 0.5 % — SIGNIFICANT CHANGE UP (ref 0–2)
BUN SERPL-MCNC: 14 MG/DL — SIGNIFICANT CHANGE UP (ref 7–23)
CALCIUM SERPL-MCNC: 9.6 MG/DL — SIGNIFICANT CHANGE UP (ref 8.4–10.5)
CHLORIDE SERPL-SCNC: 101 MMOL/L — SIGNIFICANT CHANGE UP (ref 98–107)
CHOLEST SERPL-MCNC: 99 MG/DL — LOW (ref 120–199)
CO2 SERPL-SCNC: 22 MMOL/L — SIGNIFICANT CHANGE UP (ref 22–31)
CREAT SERPL-MCNC: 0.72 MG/DL — SIGNIFICANT CHANGE UP (ref 0.5–1.3)
EOSINOPHIL # BLD AUTO: 0.11 K/UL — SIGNIFICANT CHANGE UP (ref 0–0.5)
EOSINOPHIL NFR BLD AUTO: 0.9 % — SIGNIFICANT CHANGE UP (ref 0–6)
GLUCOSE SERPL-MCNC: 156 MG/DL — HIGH (ref 70–99)
HCT VFR BLD CALC: 46.6 % — SIGNIFICANT CHANGE UP (ref 39–50)
HDLC SERPL-MCNC: 28 MG/DL — LOW (ref 35–55)
HGB BLD-MCNC: 15.2 G/DL — SIGNIFICANT CHANGE UP (ref 13–17)
IMM GRANULOCYTES # BLD AUTO: 0.06 # — SIGNIFICANT CHANGE UP
IMM GRANULOCYTES NFR BLD AUTO: 0.5 % — SIGNIFICANT CHANGE UP (ref 0–1.5)
LIPID PNL WITH DIRECT LDL SERPL: 59 MG/DL — SIGNIFICANT CHANGE UP
LYMPHOCYTES # BLD AUTO: 26 % — SIGNIFICANT CHANGE UP (ref 13–44)
LYMPHOCYTES # BLD AUTO: 3.16 K/UL — SIGNIFICANT CHANGE UP (ref 1–3.3)
MAGNESIUM SERPL-MCNC: 2.2 MG/DL — SIGNIFICANT CHANGE UP (ref 1.6–2.6)
MCHC RBC-ENTMCNC: 28.3 PG — SIGNIFICANT CHANGE UP (ref 27–34)
MCHC RBC-ENTMCNC: 32.6 % — SIGNIFICANT CHANGE UP (ref 32–36)
MCV RBC AUTO: 86.6 FL — SIGNIFICANT CHANGE UP (ref 80–100)
MONOCYTES # BLD AUTO: 0.98 K/UL — HIGH (ref 0–0.9)
MONOCYTES NFR BLD AUTO: 8.1 % — SIGNIFICANT CHANGE UP (ref 2–14)
NEUTROPHILS # BLD AUTO: 7.78 K/UL — HIGH (ref 1.8–7.4)
NEUTROPHILS NFR BLD AUTO: 64 % — SIGNIFICANT CHANGE UP (ref 43–77)
NRBC # FLD: 0 — SIGNIFICANT CHANGE UP
PLATELET # BLD AUTO: 334 K/UL — SIGNIFICANT CHANGE UP (ref 150–400)
PMV BLD: 10.7 FL — SIGNIFICANT CHANGE UP (ref 7–13)
POTASSIUM SERPL-MCNC: 4 MMOL/L — SIGNIFICANT CHANGE UP (ref 3.5–5.3)
POTASSIUM SERPL-SCNC: 4 MMOL/L — SIGNIFICANT CHANGE UP (ref 3.5–5.3)
RBC # BLD: 5.38 M/UL — SIGNIFICANT CHANGE UP (ref 4.2–5.8)
RBC # FLD: 12.9 % — SIGNIFICANT CHANGE UP (ref 10.3–14.5)
SODIUM SERPL-SCNC: 138 MMOL/L — SIGNIFICANT CHANGE UP (ref 135–145)
TRIGL SERPL-MCNC: 111 MG/DL — SIGNIFICANT CHANGE UP (ref 10–149)
WBC # BLD: 12.15 K/UL — HIGH (ref 3.8–10.5)
WBC # FLD AUTO: 12.15 K/UL — HIGH (ref 3.8–10.5)

## 2018-10-18 PROCEDURE — 99232 SBSQ HOSP IP/OBS MODERATE 35: CPT

## 2018-10-18 PROCEDURE — 74230 X-RAY XM SWLNG FUNCJ C+: CPT | Mod: 26

## 2018-10-18 PROCEDURE — 99239 HOSP IP/OBS DSCHRG MGMT >30: CPT

## 2018-10-18 PROCEDURE — 99232 SBSQ HOSP IP/OBS MODERATE 35: CPT | Mod: GC

## 2018-10-18 RX ORDER — INSULIN NPH HUM/REG INSULIN HM 70-30/ML
15 VIAL (ML) SUBCUTANEOUS
Qty: 5 | Refills: 0 | OUTPATIENT
Start: 2018-10-18 | End: 2018-11-16

## 2018-10-18 RX ORDER — ASPIRIN/CALCIUM CARB/MAGNESIUM 324 MG
1 TABLET ORAL
Qty: 0 | Refills: 0 | COMMUNITY
Start: 2018-10-18

## 2018-10-18 RX ORDER — METFORMIN HYDROCHLORIDE 850 MG/1
1 TABLET ORAL
Qty: 0 | Refills: 0 | COMMUNITY

## 2018-10-18 RX ORDER — INSULIN NPH HUM/REG INSULIN HM 70-30/ML
30 VIAL (ML) SUBCUTANEOUS
Qty: 5 | Refills: 0 | OUTPATIENT
Start: 2018-10-18 | End: 2018-11-16

## 2018-10-18 RX ORDER — CLOPIDOGREL BISULFATE 75 MG/1
1 TABLET, FILM COATED ORAL
Qty: 0 | Refills: 0 | COMMUNITY
Start: 2018-10-18

## 2018-10-18 RX ORDER — ATORVASTATIN CALCIUM 80 MG/1
1 TABLET, FILM COATED ORAL
Qty: 30 | Refills: 0 | OUTPATIENT
Start: 2018-10-18 | End: 2018-11-16

## 2018-10-18 RX ORDER — CLOPIDOGREL BISULFATE 75 MG/1
1 TABLET, FILM COATED ORAL
Qty: 21 | Refills: 0 | OUTPATIENT
Start: 2018-10-18 | End: 2018-11-07

## 2018-10-18 RX ORDER — ISOPROPYL ALCOHOL, BENZOCAINE .7; .06 ML/ML; ML/ML
0 SWAB TOPICAL
Qty: 0 | Refills: 0 | COMMUNITY

## 2018-10-18 RX ADMIN — Medication 4: at 12:47

## 2018-10-18 RX ADMIN — HEPARIN SODIUM 5000 UNIT(S): 5000 INJECTION INTRAVENOUS; SUBCUTANEOUS at 18:00

## 2018-10-18 RX ADMIN — Medication 11 UNIT(S): at 18:00

## 2018-10-18 RX ADMIN — INFLUENZA VIRUS VACCINE 0.5 MILLILITER(S): 15; 15; 15; 15 SUSPENSION INTRAMUSCULAR at 18:20

## 2018-10-18 RX ADMIN — HEPARIN SODIUM 5000 UNIT(S): 5000 INJECTION INTRAVENOUS; SUBCUTANEOUS at 05:38

## 2018-10-18 RX ADMIN — Medication 81 MILLIGRAM(S): at 11:22

## 2018-10-18 RX ADMIN — Medication 4: at 09:32

## 2018-10-18 RX ADMIN — CLOPIDOGREL BISULFATE 75 MILLIGRAM(S): 75 TABLET, FILM COATED ORAL at 11:22

## 2018-10-18 RX ADMIN — Medication 11 UNIT(S): at 12:47

## 2018-10-18 RX ADMIN — Medication 11 UNIT(S): at 09:32

## 2018-10-18 NOTE — DISCHARGE NOTE ADULT - PROVIDER TOKENS
FREE:[LAST:[Medicine Clinic],PHONE:[phone]],FAX:[(   )    -],ADDRESS:[[phone] [place] [address]  3rd Floor Oncology Building]],FREE:[LAST:[Endocrine  Clinic],PHONE:[phone]],FAX:[(   )    -],ADDRESS:[Same as Medicine Clinic]],FREE:[LAST:[Neurology Clinic],PHONE:[phone]],FAX:[(   )    -],ADDRESS:[[address], [address]   [place], [place]]]

## 2018-10-18 NOTE — DISCHARGE NOTE ADULT - CARE PROVIDER_API CALL
Medicine Clinic,   [phone] [place] [address]  3rd Floor Oncology Building  Phone: [phone]  Fax: (   )    -    Endocrine  Clinic,   Same as Medicine Clinic  Phone: [phone]  Fax: (   )    -    Neurology Clinic,   [address], [address]   [place], [place]  Phone: [phone]  Fax: (   )    -

## 2018-10-18 NOTE — SWALLOW VFSS/MBS ASSESSMENT ADULT - COMMENTS
53 year-old Man with a PMH of DM found to have 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    Patient was seen for a Clinical Swallow eval on 10/17/18 (See Consult).

## 2018-10-18 NOTE — PROGRESS NOTE ADULT - SUBJECTIVE AND OBJECTIVE BOX
HPI:  52 y/o right handed [place] Man with PMH of DM presented with the complaint of left sided UE/LE numbness and weakness with dizziness and headache .  As per the patient he was in his usual state of health and was on his way to go to work this morning and then developed left sided upper and lower extremity weakness and numbness. At work patient developed frontal and parietal headache with associated dizziness, denied any photophobia or phonophobia. Patient stated that he took a baby aspirin when the headache started. Patient stated that when he walked he felt like he was about to fall due to the dizziness. Patient also stated that 2/2 to the dizziness he had 20 episodes of NBNB vomiting today prior to coming to the ER. Patient stated that when he was walking he felt like he was dragging his left foot. Patient denied any slurred speech, changes in vision, difficulty swallowing. Patient denied any CP, fevers, SOB, D/C, abdominal pain, dysuria, melena, hematochezia, recent travel, sick contact, pleuritic or positional chest pain.     On ED admission EKG revealed  NSR at a rate of 83 with TWI in leads AVF and V5-V6 and QTc of 425, CE x 1: Negative, WBC: 17.50, Gluc: 344, Protein: 88, Alb: 5.1, Lactate: 3.4. Head CT: Age-indeterminate lacunar infarcts involve the right thalamus and right lentiform nucleus. Chronic lacunar infarcts and mild chronic white matter changes are noted as described. If the patient has a new and persistent  neurologic deficit, consider brain MRI imaging follow-up. CTA NECK: Mild to moderate stenosis involves origin of the right internal carotid artery. Minimal stenosis involves origin of the left internal carotid artery. No evidence for vertebral artery stenosis. Patient was seen by neurology and their recommendation noted. Patient seen by SLP and put on modified diet. 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  Doing well, participated with PT.  Not accepted to Acute rehab-> has accepting [facility]       REVIEW OF SYSTEMS  no chest pain, SOB, N/V        ----------------------------------------------------------------------------------------    PHYSICAL EXAM  Constitutional: NAD, Resting Comfortable  Neck: Supple, FROM,   Chest: No Respiratory Distress  Ext: No C/C/E, Pulses 2+ throughout, No calf tenderness   Neuro Exam      Cognitive: AAO x 3     Communication:  Fluent, No dysarthria      CN II - XII  intact         Coordination: FTN impaired on left     Motor             LEFT    UE:  SF 4+/5, EF 4+/5, EE 4+/5, WE 4+/5, Hand intrinsic 4+/5,  mild diminished             RIGHT UE:  SF 5/5, EF 5/5, EE 5/5, WE 5/5, Hand Intrinsic 5/5,  wnl             LEFT    LE:  HF 4/5, KE 5/5, DF 5/5, EHL 5/5,  PF 5/5             RIGHT LE:  HF 5/5, KE 5/5, DF 5/5, EHL 5/5, PF 5/5        Sensory: Intact to light touch     Tone: Normal     Reflexes: DTR Intact,  Negative Hoffmans, Negative Babinski      Gait & Balance: ataxic gait   Psychiatric: Affect WNL      ASSESSMENT/PLAN  53 year-old Man with a PMH of DM found to have 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      Recommend   - Disposition:  ACUTE inpatient rehabilitation, however barrier of no insurance  f- c/w PT for Bed Mobility, Transfers, Ambulation with AD   - c/w OT for ADLs  -leukocytosis-reactive, asymptomatic   - c/w modified diet for dysphagia HPI:  54 y/o right handed [place] Man with PMH of DM presented with the complaint of left sided UE/LE numbness and weakness with dizziness and headache .  As per the patient he was in his usual state of health and was on his way to go to work this morning and then developed left sided upper and lower extremity weakness and numbness. At work patient developed frontal and parietal headache with associated dizziness, denied any photophobia or phonophobia. Patient stated that he took a baby aspirin when the headache started. Patient stated that when he walked he felt like he was about to fall due to the dizziness. Patient also stated that 2/2 to the dizziness he had 20 episodes of NBNB vomiting today prior to coming to the ER. Patient stated that when he was walking he felt like he was dragging his left foot. Patient denied any slurred speech, changes in vision, difficulty swallowing. Patient denied any CP, fevers, SOB, D/C, abdominal pain, dysuria, melena, hematochezia, recent travel, sick contact, pleuritic or positional chest pain.     On ED admission EKG revealed  NSR at a rate of 83 with TWI in leads AVF and V5-V6 and QTc of 425, CE x 1: Negative, WBC: 17.50, Gluc: 344, Protein: 88, Alb: 5.1, Lactate: 3.4. Head CT: Age-indeterminate lacunar infarcts involve the right thalamus and right lentiform nucleus. Chronic lacunar infarcts and mild chronic white matter changes are noted as described. If the patient has a new and persistent  neurologic deficit, consider brain MRI imaging follow-up. CTA NECK: Mild to moderate stenosis involves origin of the right internal carotid artery. Minimal stenosis involves origin of the left internal carotid artery. No evidence for vertebral artery stenosis. Patient was seen by neurology and their recommendation noted. Patient seen by SLP and put on modified diet. 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  Doing well, participated with PT.  Not accepted to Acute rehab-> has accepting [facility]       REVIEW OF SYSTEMS  no chest pain, SOB, N/V        ----------------------------------------------------------------------------------------    PHYSICAL EXAM  Constitutional: NAD, Resting Comfortable  Neck: Supple, FROM,   Chest: No Respiratory Distress  Ext: No C/C/E, Pulses 2+ throughout, No calf tenderness   Neuro Exam      Cognitive: AAO x 3     Communication:  Fluent, No dysarthria      CN II - XII  intact         Coordination: FTN impaired on left     Motor             LEFT    UE:  SF 4+/5, EF 4+/5, EE 4+/5, WE 4+/5, Hand intrinsic 4+/5,  mild diminished             RIGHT UE:  SF 5/5, EF 5/5, EE 5/5, WE 5/5, Hand Intrinsic 5/5,  wnl             LEFT    LE:  HF 4/5, KE 5/5, DF 5/5, EHL 5/5,  PF 5/5             RIGHT LE:  HF 5/5, KE 5/5, DF 5/5, EHL 5/5, PF 5/5        Sensory: Intact to light touch     Tone: Normal     Reflexes: DTR Intact,  Negative Hoffmans, Negative Babinski      Gait & Balance: ataxic gait   Psychiatric: Affect WNL      ASSESSMENT/PLAN  53 year-old Man with a PMH of DM found to have 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      Recommend   - Disposition: [facility]   -swallow- regular diet with thins

## 2018-10-18 NOTE — SWALLOW VFSS/MBS ASSESSMENT ADULT - DIAGNOSTIC IMPRESSIONS
Patient presents with Functional Oral and Mild Pharyngeal Stage Dysphagia. The Oral Stage is characterized by adequate oral containment, adequate chewing for solid, adequate bolus manipulation, adequate tongue motion with adequate anterior to posterior piecemeal transfer of the bolus for solid consistency; adequate oral clearance post swallow.   The Pharyngeal Stage is characterized by mild delayed initiation of the pharyngeal swallow (Bolus head is at the vallecular for Thin Liquids), adequate laryngeal elevation, adequate tongue base retraction and adequate pharyngeal constriction. There is adequate pharyngeal clearance post swallow for puree/solids.   There was Trace Laryngeal Penetration during the swallow for Thin Liquids with retrieval and airway protection maintained. There was No Aspiration observed before, during or after the swallow across all PO Trials.

## 2018-10-18 NOTE — DISCHARGE NOTE ADULT - ADDITIONAL INSTRUCTIONS
Appointments scheduled at clinic for :   **medicine on 10/22 at 130pm [phone] at [facility] address on 3rd Floor Oncology Building  **Endocrine Clinic on  10/26 at 9am  [phone]. at [facility] ,  on 3rd Floor Oncology Building  ***Neurology Clinic on 10/30 at 9:15 am at [address], [address], Take white Tkt for Validating, Take Gerald Elevator to 3 rd Floor [phone]

## 2018-10-18 NOTE — DISCHARGE NOTE ADULT - HOSPITAL COURSE
52 y/o M with PMH of DM presented with the complaint of left sided UE/LE numbness admitted for acute CVA	    ·  CVA (cerebral vascular accident).  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  Plan: likely reactive. no sign of infection.   ·  Type 2 diabetes mellitus with hyperglycemia, without long-term current use of insulin.  Plan: A1C 10, currently still uncontrolled in hospital but improving  appreciate endo recs: would increase from lantus 26, humolog 11 units TID.  -will be discharged on 70/30 with dosage to be determined. 52 y/o M with PMH of DM presented with the complaint of left sided UE/LE numbness admitted for acute CVA	    ·  CVA (cerebral vascular accident).  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  Plan: likely reactive. no sign of infection.   ·  Type 2 diabetes mellitus with hyperglycemia, without long-term current use of insulin.  Plan: A1C 10, currently still uncontrolled in hospital but improving  -will be discharged on 70/30   Case discussed with attending, Pt is stable for discharge

## 2018-10-18 NOTE — PROGRESS NOTE ADULT - SUBJECTIVE AND OBJECTIVE BOX
Chief Complaint: DM2    History:  no n/v tolerates po, no hypoglycemia.    MEDICATIONS  (STANDING):  aspirin enteric coated 81 milliGRAM(s) Oral daily  atorvastatin 80 milliGRAM(s) Oral at bedtime  clopidogrel Tablet 75 milliGRAM(s) Oral daily  dextrose 5%. 1000 milliLiter(s) (50 mL/Hr) IV Continuous <Continuous>  dextrose 50% Injectable 12.5 Gram(s) IV Push once  dextrose 50% Injectable 25 Gram(s) IV Push once  dextrose 50% Injectable 25 Gram(s) IV Push once  heparin  Injectable 5000 Unit(s) SubCutaneous every 12 hours  influenza   Vaccine 0.5 milliLiter(s) IntraMuscular once  insulin glargine Injectable (LANTUS) 26 Unit(s) SubCutaneous at bedtime  insulin lispro (HumaLOG) corrective regimen sliding scale   SubCutaneous three times a day before meals  insulin lispro (HumaLOG) corrective regimen sliding scale   SubCutaneous at bedtime  insulin lispro Injectable (HumaLOG) 11 Unit(s) SubCutaneous three times a day before meals    MEDICATIONS  (PRN):  dextrose 40% Gel 15 Gram(s) Oral once PRN Blood Glucose LESS THAN 70 milliGRAM(s)/deciliter  glucagon  Injectable 1 milliGRAM(s) IntraMuscular once PRN Glucose LESS THAN 70 milligrams/deciliter      Allergies    No Known Allergies    Intolerances      Review of Systems:  Constitutional: No fever  Eyes: No blurry vision  Neuro: No tremors  HEENT: No pain  Cardiovascular: No chest pain, palpitations  Respiratory: No SOB, no cough  GI: No nausea, vomiting, abdominal pain  : No dysuria  Skin: no rash  Psych: no depression  Endocrine: no polyuria, polydipsia  Hem/lymph: no swelling  Osteoporosis: no fractures    ALL OTHER SYSTEMS REVIEWED AND NEGATIVE    UNABLE TO OBTAIN    PHYSICAL EXAM:  VITALS: T(C): 36.7 (10-18-18 @ 14:34)  T(F): 98 (10-18-18 @ 14:34), Max: 98 (10-18-18 @ 14:34)  HR: 80 (10-18-18 @ 14:34) (69 - 80)  BP: 123/77 (10-18-18 @ 14:34) (123/77 - 136/83)  RR:  (18 - 19)  SpO2:  (99% - 100%)  Wt(kg): --  GENERAL: NAD, well-groomed, well-developed  EYES: No proptosis, no lid lag, anicteric  HEENT:  Atraumatic, Normocephalic, moist mucous membranes  THYROID: Normal size, no palpable nodules  RESPIRATORY: Clear to auscultation bilaterally; No rales, rhonchi, wheezing  CARDIOVASCULAR: Regular rate and rhythm; No murmurs; no peripheral edema  GI: Soft, nontender, non distended  SKIN: Dry, intact, No rashes or lesions  MUSCULOSKELETAL: Full range of motion, normal strength  NEURO: extraocular movements intact, no tremor  PSYCH: Alert and oriented x 3, normal affect, normal mood      CAPILLARY BLOOD GLUCOSE      POCT Blood Glucose.: 203 mg/dL (18 Oct 2018 12:39)  POCT Blood Glucose.: 246 mg/dL (18 Oct 2018 09:25)  POCT Blood Glucose.: 163 mg/dL (17 Oct 2018 21:16)  POCT Blood Glucose.: 136 mg/dL (17 Oct 2018 17:09)      10-18    138  |  101  |  14  ----------------------------<  156<H>  4.0   |  22  |  0.72    EGFR if : 123  EGFR if non : 106    Ca    9.6      10-18  Mg     2.2     10-18            Thyroid Function Tests:  10-11 @ 13:51 TSH 1.70 FreeT4 -- T3 -- Anti TPO -- Anti Thyroglobulin Ab -- TSI --      Hemoglobin A1C, Whole Blood: 10.1 % <H> [4.0 - 5.6] (10-11-18 @ 13:51) Chief Complaint: DM2    History:  no n/v tolerates po, no hypoglycemia.    MEDICATIONS  (STANDING):  aspirin enteric coated 81 milliGRAM(s) Oral daily  atorvastatin 80 milliGRAM(s) Oral at bedtime  clopidogrel Tablet 75 milliGRAM(s) Oral daily  dextrose 5%. 1000 milliLiter(s) (50 mL/Hr) IV Continuous <Continuous>  dextrose 50% Injectable 12.5 Gram(s) IV Push once  dextrose 50% Injectable 25 Gram(s) IV Push once  dextrose 50% Injectable 25 Gram(s) IV Push once  heparin  Injectable 5000 Unit(s) SubCutaneous every 12 hours  influenza   Vaccine 0.5 milliLiter(s) IntraMuscular once  insulin glargine Injectable (LANTUS) 26 Unit(s) SubCutaneous at bedtime  insulin lispro (HumaLOG) corrective regimen sliding scale   SubCutaneous three times a day before meals  insulin lispro (HumaLOG) corrective regimen sliding scale   SubCutaneous at bedtime  insulin lispro Injectable (HumaLOG) 11 Unit(s) SubCutaneous three times a day before meals    MEDICATIONS  (PRN):  dextrose 40% Gel 15 Gram(s) Oral once PRN Blood Glucose LESS THAN 70 milliGRAM(s)/deciliter  glucagon  Injectable 1 milliGRAM(s) IntraMuscular once PRN Glucose LESS THAN 70 milligrams/deciliter      Allergies    No Known Allergies    Intolerances      Review of Systems:  Constitutional: No fever  Eyes: No blurry vision      ALL OTHER SYSTEMS REVIEWED AND NEGATIVE        PHYSICAL EXAM:  VITALS: T(C): 36.7 (10-18-18 @ 14:34)  T(F): 98 (10-18-18 @ 14:34), Max: 98 (10-18-18 @ 14:34)  HR: 80 (10-18-18 @ 14:34) (69 - 80)  BP: 123/77 (10-18-18 @ 14:34) (123/77 - 136/83)  RR:  (18 - 19)  SpO2:  (99% - 100%)  Wt(kg): --  GENERAL: NAD, well-groomed, well-developed  EYES: No proptosis, no lid lag, anicteric  HEENT:  Atraumatic, Normocephalic, moist mucous membranes  THYROID: Normal size, no palpable nodules  RESPIRATORY: Clear to auscultation bilaterally; No rales, rhonchi, wheezing  CARDIOVASCULAR: Regular rate and rhythm; No murmurs; no peripheral edema  GI: Soft, nontender, non distended  SKIN: Dry, intact, No rashes or lesions  MUSCULOSKELETAL: Full range of motion, normal strength  NEURO: extraocular movements intact, no tremor  PSYCH: Alert and oriented x 3, normal affect, normal mood      CAPILLARY BLOOD GLUCOSE      POCT Blood Glucose.: 203 mg/dL (18 Oct 2018 12:39)  POCT Blood Glucose.: 246 mg/dL (18 Oct 2018 09:25)  POCT Blood Glucose.: 163 mg/dL (17 Oct 2018 21:16)  POCT Blood Glucose.: 136 mg/dL (17 Oct 2018 17:09)      10-18    138  |  101  |  14  ----------------------------<  156<H>  4.0   |  22  |  0.72    EGFR if : 123  EGFR if non : 106    Ca    9.6      10-18  Mg     2.2     10-18            Thyroid Function Tests:  10-11 @ 13:51 TSH 1.70 FreeT4 -- T3 -- Anti TPO -- Anti Thyroglobulin Ab -- TSI --      Hemoglobin A1C, Whole Blood: 10.1 % <H> [4.0 - 5.6] (10-11-18 @ 13:51)

## 2018-10-18 NOTE — SWALLOW VFSS/MBS ASSESSMENT ADULT - PHARYNGEAL PHASE COMMENTS
adequate initiation of the pharyngeal swallow, adequate laryngeal elevation, adequate tongue base retraction, adequate pharyngeal constriction mild delayed initiation of the pharyngeal swallow, adequate laryngeal elevation, adequate tongue base retraction, adequate pharyngeal constriction

## 2018-10-18 NOTE — PROGRESS NOTE ADULT - REASON FOR ADMISSION
Left sided numbness and weakness with headache

## 2018-10-18 NOTE — SWALLOW VFSS/MBS ASSESSMENT ADULT - RECOMMENDED CONSISTENCY
1.) Regular Consistency with Thin Liquids  2.) Feeding/Swallowing Guidelines: Sit upright, small bites, chew solids well, single cup sips of thin liquids  3.) Aspiration Precautions  4.) Maintain Good Oral Hygiene Care

## 2018-10-18 NOTE — DISCHARGE NOTE ADULT - PLAN OF CARE
To help recover or improve as much as possible your sensory and motor abilities, to become more independent, and prevent future strokes. Continue physical therapy and skills learned for rehabilitation.  Follow up with at the [facility] on 10/30 at 9:15 am for further medical management.  Continue to take your medications as prescribed, low salt, low fat, and diabetic diet.  Consider joining a support group for stroke survivors for emotional support to prevent depression. Reactive , NO signs of infection Follow at [facility] To maintain a normal blood glucose level and HgA1C level < 5.7 and to prevent diabetic complications such as uncontrolled diabetes, diabetic coma, blindness, renal failure, and amputations. Monitor finger sticks pre-meal and bedtime, low salt, fat and carbohydrate diet, minimize glucose intake.  Exercise daily for at least 30 minutes and weight loss.  Follow up with primary care physician and endocrinologist for routine Hemoglobin A1C checks and management.  Follow up with your ophthalmologist for routine yearly vision exams.

## 2018-10-18 NOTE — DISCHARGE NOTE ADULT - CARE PLAN
Principal Discharge DX:	CVA (cerebral vascular accident)  Goal:	To help recover or improve as much as possible your sensory and motor abilities, to become more independent, and prevent future strokes.  Assessment and plan of treatment:	Continue physical therapy and skills learned for rehabilitation.  Follow up with at the [facility] Clinic on 10/30 at 9:15 am for further medical management.  Continue to take your medications as prescribed, low salt, low fat, and diabetic diet.  Consider joining a support group for stroke survivors for emotional support to prevent depression.  Secondary Diagnosis:	Leukocytosis  Goal:	Reactive , NO signs of infection  Assessment and plan of treatment:	Follow at [facility]  Secondary Diagnosis:	Type 2 diabetes mellitus with hyperglycemia, without long-term current use of insulin  Goal:	To maintain a normal blood glucose level and HgA1C level < 5.7 and to prevent diabetic complications such as uncontrolled diabetes, diabetic coma, blindness, renal failure, and amputations.  Assessment and plan of treatment:	Monitor finger sticks pre-meal and bedtime, low salt, fat and carbohydrate diet, minimize glucose intake.  Exercise daily for at least 30 minutes and weight loss.  Follow up with primary care physician and endocrinologist for routine Hemoglobin A1C checks and management.  Follow up with your ophthalmologist for routine yearly vision exams.

## 2018-10-18 NOTE — PROGRESS NOTE ADULT - PROBLEM SELECTOR PLAN 1
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  -discussed with them at length that was not accepted to any acute rehabs, but was accepted to a subacute/TBI center.  Requested they gspeak to them regarding financials.  Offered multiple times to help set up insurance.  Julian and Kelsie stated went to "a place" to re-instate old insurance, but could not tell us address or name of insurance.  Did not want to disclose any information.  Patient decided wanted to go home.  Understood this was not a preferred plan recommended by us, knows has less likely recovery of stroke symptoms, but has insight into this, able to recite this back to me, has capacity to make this decision.  -social work to hep arrange home care if patient wants it.  -medications sent to pharmacy patient gave.

## 2018-10-18 NOTE — DISCHARGE NOTE ADULT - HOME CARE AGENCY
[facility] [phone]. Initial visit will be day after discharge home. A nurse will call prior to home visit  accept  case as a financial assist based on a sliding scale until Medicaid becomes active.

## 2018-10-18 NOTE — PROGRESS NOTE ADULT - PROBLEM SELECTOR PLAN 1
target bg 100-180 mg/dl  c/w lantus to 26 units sq qhs as patient ate this morning before FS  Continue humalog to 11 units sq tid ac  c/w current crrection scale fro now    **Plan is tentative pending NP approval**  Dc planning- Anticipate Novolin 70 / 30 vials and syringes. DOSES TBD Please [facility] t [facility].  Patient also needs new glucomter, test strips, lancets and syringes.  Family taught. target bg 100-180 mg/dl  c/w lantus to 26 units sq qhs as patient ate this morning before FS  Continue humalog to 11 units sq tid ac  c/w current crrection scale fro now    **Plan is tentative pending NP approval**  Dc planning-  Novolin 70 / 30 vials and syringes. 30 units pre breakfast and 15 units pre dinner.    Please send scripts t [facility].  Patient also needs new glucomter, test strips, lancets and insulin syringes.  Family taught.    Follow up at [facility] endocrine clinic on 10/26 at 9am.  [phone]    - Saw patilarsn with NP colleague.  Agree with above plan.  Sherry

## 2018-10-18 NOTE — DISCHARGE NOTE ADULT - PATIENT PORTAL LINK FT
You can access the [facility] Patient Portal, offered by [facility], by registering with the following website: [email or web address]

## 2018-10-18 NOTE — DISCHARGE NOTE ADULT - MEDICATION SUMMARY - MEDICATIONS TO TAKE
I will START or STAY ON the medications listed below when I get home from the hospital:    walker  -- Use as directed  -- Indication: For To help with ambulation    Glucometer  -- PLease  Buy at [facility]  -- Indication: For Dm    test strip  -- use as directed 4 times aday  -- Indication: For Diabetes    insulin syringes  -- use as directed 2 times a day  Please Buy at  [facility]  -- Indication: For Diabetes    lancet  -- Test Blood sugar 4 times a day  You can purchase at [facility]  -- Indication: For Diabetes    alcohol swabs  -- use as directed  -- Indication: For Diabetes    aspirin 81 mg oral delayed release tablet  -- 1 tab(s) by mouth once a day  -- Indication: For CVA (cerebral vascular accident)    HumuLIN 70/30 subcutaneous suspension  -- 30 unit(s) subcutaneous once a day with breakfast  -- Do not drink alcoholic beverages when taking this medication.  It is very important that you take or use this exactly as directed.  Do not skip doses or discontinue unless directed by your doctor.  Keep in refrigerator.  Do not freeze.    -- Indication: For Diabetes    HumuLIN 70/30 subcutaneous suspension  -- 15 unit(s) subcutaneous once a day with Dinner  -- Do not drink alcoholic beverages when taking this medication.  It is very important that you take or use this exactly as directed.  Do not skip doses or discontinue unless directed by your doctor.  Keep in refrigerator.  Do not freeze.    -- Indication: For Diabetes    atorvastatin 80 mg oral tablet  -- 1 tab(s) by mouth once a day (at bedtime)  -- Indication: For Cholesterol    clopidogrel 75 mg oral tablet  -- 1 tab(s) by mouth once a day  -- Indication: For CVA (cerebral vascular accident) I will START or STAY ON the medications listed below when I get home from the hospital:    walker  -- Use as directed  -- Indication: For To help with ambulation    Glucometer  -- PLease  Buy at [facility]  -- Indication: For Dm    test strip  -- use as directed 4 times aday  -- Indication: For Diabetes    lancet  -- Test Blood sugar 4 times a day  You can purchase at [facility]  -- Indication: For Diabetes    alcohol swabs  -- use as directed  -- Indication: For Diabetes    insulin syringes  -- 0.3 milliliter(s) , Use as directed twice a day  -- Indication: For Diabetes    aspirin 81 mg oral delayed release tablet  -- 1 tab(s) by mouth once a day  -- Indication: For CVA (cerebral vascular accident)    NovoLIN 70/30 subcutaneous suspension  -- 30 unit(s) subcutaneous once a day with breakfast  -- Do not drink alcoholic beverages when taking this medication.  It is very important that you take or use this exactly as directed.  Do not skip doses or discontinue unless directed by your doctor.  Keep in refrigerator.  Do not freeze.    -- Indication: For Diabetes    NovoLIN 70/30 subcutaneous suspension  -- 15 unit(s) subcutaneous once a day with Dinner  -- Do not drink alcoholic beverages when taking this medication.  It is very important that you take or use this exactly as directed.  Do not skip doses or discontinue unless directed by your doctor.  Keep in refrigerator.  Do not freeze.    -- Indication: For Diabetes    atorvastatin 80 mg oral tablet  -- 1 tab(s) by mouth once a day (at bedtime)  -- Indication: For Cholesterol    clopidogrel 75 mg oral tablet  -- 1 tab(s) by mouth once a day  -- Indication: For CVA (cerebral vascular accident)

## 2018-10-18 NOTE — DISCHARGE NOTE ADULT - OTHER SIGNIFICANT FINDINGS
EKG: NSR at a rate of 83 with TWI in leads AVF and V5-V6 and QTc of 425  CE x 1: Negative   WBC: 17.50  Gluc: 344  Protein: 88  Alb: 5.1  Lactate: 3.4  Head CT: Age-indeterminate lacunar infarcts involve the right thalamus and right lentiform nucleus. Chronic lacunar infarcts and mild chronic white matter changes are noted as described. If the patient has a new and persistent  neurologic deficit, consider brain MRI imaging follow-up.  CTA NECK: Mild to moderate stenosis involves origin of the right internal carotid artery. Minimal stenosis involves origin of the left internal carotid artery. No evidence for vertebral artery stenosis.  CTA head: No evidence for significant intracranial stenosis or major vessel occlusion.  Prelim CXR: Linear opacities in the left lower lung which likely   represent subsegmental atelectasis.    10/11- Speech and swallow - SOFT WITH NECTAR THICK     MRI HEAD - 10/11 MRI head - An acute infarct involves the right pontomedullary junction, without hemorrhagic transformation    10/13 ECHO: EF 52%  1. Normal left ventricular internal dimensions and wall thicknesses.  2. Normal left ventricular systolic function. No segmental wall motion abnormalities.  3. Mild diastolic dysfunction (Stage I).  4. Normal right ventricular size and function.  5. Agitated saline injection demonstrates no evidence of a patent foramen ovale.

## 2018-10-18 NOTE — PROGRESS NOTE ADULT - SUBJECTIVE AND OBJECTIVE BOX
Patient is a 53y old  Male who presents with a chief complaint of Left sided numbness and weakness with headache (14 Oct 2018 14:25)      SUBJECTIVE / OVERNIGHT EVENTS:  No acute events overnight. Patient offers no complaints. Still wtih numbness on left side.     MEDICATIONS  (STANDING):  aspirin enteric coated 81 milliGRAM(s) Oral daily  atorvastatin 80 milliGRAM(s) Oral at bedtime  clopidogrel Tablet 75 milliGRAM(s) Oral daily  dextrose 5%. 1000 milliLiter(s) (50 mL/Hr) IV Continuous <Continuous>  dextrose 50% Injectable 12.5 Gram(s) IV Push once  dextrose 50% Injectable 25 Gram(s) IV Push once  dextrose 50% Injectable 25 Gram(s) IV Push once  heparin  Injectable 5000 Unit(s) SubCutaneous every 12 hours  influenza   Vaccine 0.5 milliLiter(s) IntraMuscular once  insulin glargine Injectable (LANTUS) 15 Unit(s) SubCutaneous at bedtime  insulin lispro (HumaLOG) corrective regimen sliding scale   SubCutaneous three times a day before meals  insulin lispro (HumaLOG) corrective regimen sliding scale   SubCutaneous at bedtime  insulin lispro Injectable (HumaLOG) 5 Unit(s) SubCutaneous three times a day before meals    MEDICATIONS  (PRN):  dextrose 40% Gel 15 Gram(s) Oral once PRN Blood Glucose LESS THAN 70 milliGRAM(s)/deciliter  glucagon  Injectable 1 milliGRAM(s) IntraMuscular once PRN Glucose LESS THAN 70 milligrams/deciliter  Vital Signs Last 24 Hrs  T(C): 36.7 (18 Oct 2018 14:34), Max: 36.7 (18 Oct 2018 14:34)  T(F): 98 (18 Oct 2018 14:34), Max: 98 (18 Oct 2018 14:34)  HR: 80 (18 Oct 2018 14:34) (69 - 80)  BP: 123/77 (18 Oct 2018 14:34) (123/77 - 136/83)  BP(mean): --  RR: 19 (18 Oct 2018 14:34) (18 - 19)  SpO2: 100% (18 Oct 2018 14:34) (99% - 100%)  CAPILLARY BLOOD GLUCOSE      CAPILLARY BLOOD GLUCOSE      POCT Blood Glucose.: 203 mg/dL (18 Oct 2018 12:39)  POCT Blood Glucose.: 246 mg/dL (18 Oct 2018 09:25)  POCT Blood Glucose.: 163 mg/dL (17 Oct 2018 21:16)  POCT Blood Glucose.: 136 mg/dL (17 Oct 2018 17:09)          PHYSICAL EXAM:  GENERAL: NAD, well-developed  HEAD:  Atraumatic, Normocephalic  EYES: EOMI, PERRLA, conjunctiva and sclera clear  NECK: Supple, No JVD  CHEST/LUNG: Clear to auscultation bilaterally; No wheeze  HEART: s1 s2, regular rhythm and rate   ABDOMEN: Soft, Nontender, Nondistended; Bowel sounds present  EXTREMITIES:  2+ Peripheral Pulses, No clubbing, cyanosis, or edema  PSYCH: AAOx3, calm   NEUROLOGY: Left side weaker than right, but strengh 5/5 through muscles groups .+subjective numbness on left.  SKIN: No rashes or lesions    LABS:                                               15.2   12.15 )-----------( 334      ( 18 Oct 2018 07:00 )             46.6   10-18    138  |  101  |  14  ----------------------------<  156<H>  4.0   |  22  |  0.72    Ca    9.6      18 Oct 2018 07:00  Mg     2.2     10-18            RADIOLOGY & ADDITIONAL TESTS:    Imaging Personally Reviewed:    Consultant(s) Notes Reviewed:     Care Discussed with Consultants/Other Providers: PM&R attending, Endocrine Team,

## 2018-10-18 NOTE — PHARMACOTHERAPY INTERVENTION NOTE - COMMENTS
Called provider to recommend switch from Humulin 70/30 to Novolin 70/30 (less expensive at [facility]). Also asked to send #100, 0.3mL insulin syringes to [facility] for patient as well.

## 2018-10-19 LAB — GAS PNL BLDMV: SIGNIFICANT CHANGE UP

## 2018-10-22 ENCOUNTER — APPOINTMENT (OUTPATIENT)
Dept: INTERNAL MEDICINE | Facility: HOSPITAL | Age: 53
End: 2018-10-22

## 2018-10-22 RX ORDER — ASPIRIN 81 MG/1
81 TABLET, CHEWABLE ORAL DAILY
Qty: 30 | Refills: 0 | Status: ACTIVE | COMMUNITY
Start: 2018-10-22

## 2018-10-22 RX ORDER — NEEDLES, SAFETY 22GX1 1/2"
31G X 5/16" NEEDLE, DISPOSABLE MISCELLANEOUS
Qty: 100 | Refills: 0 | Status: ACTIVE | COMMUNITY
Start: 2018-10-22

## 2018-10-22 RX ORDER — ATORVASTATIN CALCIUM 80 MG/1
80 TABLET, FILM COATED ORAL
Qty: 30 | Refills: 0 | Status: ACTIVE | COMMUNITY
Start: 2018-10-22

## 2018-10-26 ENCOUNTER — OUTPATIENT (OUTPATIENT)
Dept: OUTPATIENT SERVICES | Facility: HOSPITAL | Age: 53
LOS: 1 days | End: 2018-10-26

## 2018-10-26 ENCOUNTER — LABORATORY RESULT (OUTPATIENT)
Age: 53
End: 2018-10-26

## 2018-10-26 ENCOUNTER — APPOINTMENT (OUTPATIENT)
Dept: ENDOCRINOLOGY | Facility: HOSPITAL | Age: 53
End: 2018-10-26

## 2018-10-26 ENCOUNTER — RESULT REVIEW (OUTPATIENT)
Age: 53
End: 2018-10-26

## 2018-10-26 ENCOUNTER — RESULT CHARGE (OUTPATIENT)
Age: 53
End: 2018-10-26

## 2018-10-26 VITALS
BODY MASS INDEX: 26.12 KG/M2 | DIASTOLIC BLOOD PRESSURE: 83 MMHG | WEIGHT: 153 LBS | HEIGHT: 64 IN | HEART RATE: 89 BPM | SYSTOLIC BLOOD PRESSURE: 143 MMHG

## 2018-10-26 VITALS
DIASTOLIC BLOOD PRESSURE: 83 MMHG | HEIGHT: 64 IN | WEIGHT: 153.06 LBS | SYSTOLIC BLOOD PRESSURE: 143 MMHG | HEART RATE: 89 BPM | BODY MASS INDEX: 26.13 KG/M2

## 2018-10-26 VITALS
DIASTOLIC BLOOD PRESSURE: 80 MMHG | BODY MASS INDEX: 26.12 KG/M2 | WEIGHT: 153 LBS | SYSTOLIC BLOOD PRESSURE: 143 MMHG | HEIGHT: 64 IN

## 2018-10-26 VITALS — HEART RATE: 89 BPM

## 2018-10-26 DIAGNOSIS — E11.65 TYPE 2 DIABETES MELLITUS WITH HYPERGLYCEMIA: ICD-10-CM

## 2018-10-26 PROBLEM — Z78.9 ALCOHOL USE: Status: ACTIVE | Noted: 2018-10-26

## 2018-10-26 PROBLEM — Z86.73 HISTORY OF STROKE: Status: RESOLVED | Noted: 2018-10-26 | Resolved: 2018-10-26

## 2018-10-26 PROBLEM — E11.8 TYPE 2 DIABETES WITH COMPLICATION: Status: RESOLVED | Noted: 2018-10-26 | Resolved: 2018-10-26

## 2018-10-26 PROBLEM — Z80.0 FAMILY HISTORY OF PANCREATIC CANCER: Status: ACTIVE | Noted: 2018-10-26

## 2018-10-26 LAB
CREAT UR-MCNC: 142 MG/DL — SIGNIFICANT CHANGE UP
GLUCOSE BLDC GLUCOMTR-MCNC: 283
MICROALBUMIN UR-MCNC: 6.7 MG/DL — SIGNIFICANT CHANGE UP
MICROALBUMIN/CREAT UR-RTO: 47 MG/G — HIGH (ref 0–30)

## 2018-10-26 RX ORDER — HUMAN INSULIN 100 [IU]/ML
(70-30) 100 INJECTION, SUSPENSION SUBCUTANEOUS
Qty: 2 | Refills: 0 | Status: ACTIVE | COMMUNITY
Start: 2018-10-22 | End: 1900-01-01

## 2018-10-26 RX ORDER — LANCETS 33 GAUGE
EACH MISCELLANEOUS
Qty: 100 | Refills: 0 | Status: ACTIVE | COMMUNITY
Start: 2018-10-22 | End: 1900-01-01

## 2018-10-26 RX ORDER — BLOOD-GLUCOSE METER
KIT MISCELLANEOUS 3 TIMES DAILY
Qty: 100 | Refills: 0 | Status: ACTIVE | COMMUNITY
Start: 2018-10-22 | End: 1900-01-01

## 2018-10-30 ENCOUNTER — APPOINTMENT (OUTPATIENT)
Dept: NEUROLOGY | Facility: HOSPITAL | Age: 53
End: 2018-10-30

## 2018-10-30 ENCOUNTER — OUTPATIENT (OUTPATIENT)
Dept: OUTPATIENT SERVICES | Facility: HOSPITAL | Age: 53
LOS: 1 days | End: 2018-10-30
Payer: MEDICAID

## 2018-10-30 VITALS
HEIGHT: 64 IN | HEART RATE: 84 BPM | BODY MASS INDEX: 26.12 KG/M2 | SYSTOLIC BLOOD PRESSURE: 122 MMHG | RESPIRATION RATE: 14 BRPM | DIASTOLIC BLOOD PRESSURE: 81 MMHG | WEIGHT: 153 LBS

## 2018-10-30 DIAGNOSIS — R56.9 UNSPECIFIED CONVULSIONS: ICD-10-CM

## 2018-10-30 DIAGNOSIS — Z78.9 OTHER SPECIFIED HEALTH STATUS: ICD-10-CM

## 2018-10-30 DIAGNOSIS — Z80.0 FAMILY HISTORY OF MALIGNANT NEOPLASM OF DIGESTIVE ORGANS: ICD-10-CM

## 2018-10-30 DIAGNOSIS — Z86.73 PERSONAL HISTORY OF TRANSIENT ISCHEMIC ATTACK (TIA), AND CEREBRAL INFARCTION W/OUT RESIDUAL DEFICITS: ICD-10-CM

## 2018-10-30 DIAGNOSIS — E11.9 TYPE 2 DIABETES MELLITUS WITHOUT COMPLICATIONS: ICD-10-CM

## 2018-10-30 DIAGNOSIS — I63.9 CEREBRAL INFARCTION, UNSPECIFIED: ICD-10-CM

## 2018-10-30 DIAGNOSIS — E11.8 TYPE 2 DIABETES MELLITUS WITH UNSPECIFIED COMPLICATIONS: ICD-10-CM

## 2018-10-30 DIAGNOSIS — E11.9 TYPE 2 DIABETES MELLITUS W/OUT COMPLICATIONS: ICD-10-CM

## 2018-10-30 PROCEDURE — G0463: CPT

## 2018-10-30 RX ORDER — CLOPIDOGREL BISULFATE 75 MG/1
75 TABLET, FILM COATED ORAL
Qty: 21 | Refills: 0 | Status: DISCONTINUED | COMMUNITY
Start: 2018-10-22 | End: 2018-10-30

## 2019-01-08 ENCOUNTER — APPOINTMENT (OUTPATIENT)
Dept: NEUROLOGY | Facility: HOSPITAL | Age: 54
End: 2019-01-08

## 2020-08-07 NOTE — OCCUPATIONAL THERAPY INITIAL EVALUATION ADULT - EATING, PREVIOUS LEVEL OF FUNCTION, OT EVAL
independent Zygomaticofacial Flap Text: Given the location of the defect, shape of the defect and the proximity to free margins a zygomaticofacial flap was deemed most appropriate for repair.  Using a sterile surgical marker, the appropriate flap was drawn incorporating the defect and placing the expected incisions within the relaxed skin tension lines where possible. The area thus outlined was incised deep to adipose tissue with a #15 scalpel blade with preservation of a vascular pedicle.  The skin margins were undermined to an appropriate distance in all directions utilizing iris scissors.  The flap was then placed into the defect and anchored with interrupted buried subcutaneous sutures.

## 2022-01-01 NOTE — H&P ADULT - NSHPPHYSICALEXAM_GEN_ALL_CORE
Vital Signs Last 24 Hrs  T(C): 36.7 (11 Oct 2018 06:06), Max: 37.1 (10 Oct 2018 16:50)  T(F): 98 (11 Oct 2018 06:06), Max: 98.8 (10 Oct 2018 16:50)  HR: 78 (11 Oct 2018 06:06) (77 - 90)  BP: 131/63 (11 Oct 2018 06:06) (113/87 - 160/98)  BP(mean): --  RR: 18 (11 Oct 2018 06:06) (16 - 18)  SpO2: 99% (11 Oct 2018 06:06) (97% - 100%) Was done for at least one hour

## 2023-03-06 NOTE — ED ADULT TRIAGE NOTE - TEMPERATURE IN CELSIUS (DEGREES C)
37.1 Topical Clindamycin Pregnancy And Lactation Text: This medication is Pregnancy Category B and is considered safe during pregnancy. It is unknown if it is excreted in breast milk.

## 2024-01-04 ENCOUNTER — APPOINTMENT (OUTPATIENT)
Dept: ENDOCRINOLOGY | Facility: CLINIC | Age: 59
End: 2024-01-04

## 2024-03-20 ENCOUNTER — EMERGENCY (EMERGENCY)
Facility: HOSPITAL | Age: 59
LOS: 1 days | Discharge: HOME | End: 2024-03-20
Attending: STUDENT IN AN ORGANIZED HEALTH CARE EDUCATION/TRAINING PROGRAM | Admitting: STUDENT IN AN ORGANIZED HEALTH CARE EDUCATION/TRAINING PROGRAM
Payer: MEDICAID

## 2024-03-20 VITALS
SYSTOLIC BLOOD PRESSURE: 172 MMHG | RESPIRATION RATE: 18 BRPM | OXYGEN SATURATION: 98 % | HEART RATE: 70 BPM | DIASTOLIC BLOOD PRESSURE: 83 MMHG | TEMPERATURE: 98 F

## 2024-03-20 PROCEDURE — 99283 EMERGENCY DEPT VISIT LOW MDM: CPT

## 2024-03-20 PROCEDURE — 93010 ELECTROCARDIOGRAM REPORT: CPT

## 2024-03-20 NOTE — ED ADULT TRIAGE NOTE - CHIEF COMPLAINT QUOTE
Pt. c/o blurry vision and burning in eyes since Saturday. Seen by his eye doctor on Monday and told he had blood in the eyes. Pt. to return for follow up next Monday. Denies headache or dizziness. Phx: CVA, HTN, DM

## 2024-03-20 NOTE — ED PROVIDER NOTE - PATIENT PORTAL LINK FT
You can access the FollowMyHealth Patient Portal offered by [facility] by registering at the following website: [email or web address]. By joining [employer]’s FollowMyHealth portal, you will also be able to view your health information using other applications (apps) compatible with our system.

## 2024-03-20 NOTE — ED PROVIDER NOTE - NSFOLLOWUPINSTRUCTIONS_ED_ALL_ED_FT
Diabetic Retinopathy    Diabetic retinopathy is a disease of the retina. The retina is a light-sensitive membrane at the back of the eye. Retinopathy is a complication of diabetes and a common cause of bad eyesight. It can eventually cause blindness. Early detection and treatment of diabetic retinopathy is important in keeping your eyes healthy and preventing further damage to them.    What are the causes?  Diabetic retinopathy is caused by blood sugar (glucose) levels that are too high for a long period of time. Blood glucose levels that are too high for a long time can:  Damage small blood vessels in the retina, allowing blood to leak through the vessel walls.  Cause new, abnormal blood vessels to grow on the retina. This can scar the retina in the advanced stage of diabetic retinopathy.  What increases the risk?  You are more likely to develop this condition if:  You have had diabetes for a long time.  You have poorly controlled blood glucose.  You have high blood pressure.  What are the signs or symptoms?  In the early stages of diabetic retinopathy, there are often no symptoms. As the condition gets worse, symptoms may include:  Blurred vision. This is usually caused by swelling due to abnormal blood glucose levels. The blurriness may go away when blood glucose levels return to normal.  Moving specks or dark spots (floaters) in your vision. These can be caused by a small amount of bleeding (hemorrhage) from retinal blood vessels.  Missing parts of your field of vision, such as vision at the sides of the eyes. This can be caused by larger retinal hemorrhages.  Difficulty reading.  Double vision.  Pain in one or both eyes.  Feeling pressure in one or both eyes.  Trouble seeing straight lines. Straight lines may not look straight.  Redness of the eyes that does not go away.  How is this diagnosed?  This condition may be diagnosed with an eye exam. For this exam, your eye specialist puts drops in your eyes that enlarge your pupils. The retina is then checked for changes in its blood vessels.    How is this treated?  This condition may be treated by:  Keeping your blood glucose and blood pressure within a target range.  Using a type of laser beam to seal your retinal blood vessels. This stops them from bleeding and decreases pressure in your eye.  Getting shots of medicine in the eye to reduce swelling of the center of the retina (macula). You may be given:  Anti-VEGF medicine. This medicine can help slow vision loss, and may even improve vision.  Steroid medicine.  Follow these instructions at home:  Follow your diabetes management plan as directed by your health care provider. This may include exercising regularly and eating a healthy diet.  Keep your blood glucose level and your blood pressure in your target range. Your health care provider will tell you what your target is.  Check your blood glucose as often as directed.  Take over the counter and prescription medicines only as told by your health care provider. This includes insulin and oral diabetes medicine.  Get your eyes checked at least once every year. An eye specialist can usually see diabetic retinopathy developing long before it starts to cause problems. In many cases, it can be treated to prevent problems from starting in the first place.  Do not use any products that contain nicotine or tobacco, such as cigarettes, e-cigarettes, and chewing tobacco. If you need help quitting, ask your health care provider.  Keep all follow-up visits. This is important.  Contact a health care provider if:  You notice gradual blurring or other changes in your vision over time.  You notice that your glasses or contact lenses do not make things look as sharp as they once did.  You have trouble reading or seeing details at a distance with either eye.  You notice a change in your vision or notice that parts of your field of vision appear missing or hazy.  You suddenly see moving specks or dark spots in the field of vision of either eye.  Get help right away if:  You have sudden pain or pressure in one or both eyes.  You suddenly lose vision or a curtain or veil seems to come across your eyes.  You have a sudden burst of floaters in your vision.  Summary  Diabetic retinopathy is a disease of the retina. The retina is a light-sensitive membrane at the back of the eye. Retinopathy is a complication of diabetes.  Get your eyes checked at least once every year. An eye specialist can usually see diabetic retinopathy developing long before it starts to cause problems. In many cases, it can be treated to prevent problems from starting in the first place.  Keep your blood glucose and your blood pressure in your target range. Follow your diabetes management plan as directed by your health care provider.  Get help right away if you have sudden pain or sudden pressure in one or both eyes. Also, get help if you have a sudden burst of floaters in your vision.

## 2024-03-20 NOTE — ED PROVIDER NOTE - PHYSICAL EXAMINATION
VITALS: reviewed  GEN: NAD, A & O x 4  HEAD/EYES: NCAT, PERRL, EOMI, no fluorescin uptake b/l, OU 20/40, OD 20/70, OS 20/40, OD  anicteric sclerae,   ENT: mucus membranes moist, oropharynx WNL, trachea midline,   RESP: unlabored breathing  CV: distal pulses intact and symmetric bilaterally  MSK: extremities atraumatic and nontender, no edema, no asymmetry.   SKIN: warm, dry, no rash, no bruising, no cyanosis. color appropriate for ethnicity  NEURO: alert, mentating appropriately, no facial asymmetry.  PSYCH: Affect appropriate

## 2024-03-20 NOTE — ED PROVIDER NOTE - CLINICAL SUMMARY MEDICAL DECISION MAKING FREE TEXT BOX
59 yo M hx HTN, HLD, DM2 with diabetic retinopathy, presenting with complaints of blurred vision and floaters in R eye. SEen by his eye doctor on Monday and told that he has "bleeding from the eye because of his diabetes." No new symptoms, and patient has follow up on Monday, but he is nervous and does not feel he can wait until then. I called his ophthalmologist, Dr. Vigil, who states that patient can follow up with him in the office tomorrow. Will dc with recs to follow up in office.

## 2024-05-09 NOTE — PATIENT PROFILE ADULT - SURGICAL SITE INCISION
Received a call from [facility] requesting new scripts for One Touch Verio Test Strips and Meter. Contour Next is no longer covered by the patient's insurance. Patient is testing his blood sugar 8 times per day.     Medications pended to the requested pharmacy and will route to PCP for approval. No need to call pharmacy back unless medications cannot be approved.     Thank you,  Urvashi Watson RN     no

## 2024-09-11 ENCOUNTER — INPATIENT (INPATIENT)
Facility: HOSPITAL | Age: 59
LOS: 0 days | Discharge: LEFT BEFORE DISCHARGE | End: 2024-09-12
Attending: INTERNAL MEDICINE | Admitting: INTERNAL MEDICINE
Payer: MEDICAID

## 2024-09-11 VITALS
WEIGHT: 169.98 LBS | RESPIRATION RATE: 18 BRPM | OXYGEN SATURATION: 96 % | SYSTOLIC BLOOD PRESSURE: 150 MMHG | DIASTOLIC BLOOD PRESSURE: 81 MMHG | HEART RATE: 123 BPM | TEMPERATURE: 102 F

## 2024-09-11 LAB
ALBUMIN SERPL ELPH-MCNC: 4.3 G/DL — SIGNIFICANT CHANGE UP (ref 3.3–5)
ALP SERPL-CCNC: 55 U/L — SIGNIFICANT CHANGE UP (ref 40–120)
ALT FLD-CCNC: 34 U/L — SIGNIFICANT CHANGE UP (ref 4–41)
ANION GAP SERPL CALC-SCNC: 15 MMOL/L — HIGH (ref 7–14)
APPEARANCE UR: CLEAR — SIGNIFICANT CHANGE UP
AST SERPL-CCNC: 34 U/L — SIGNIFICANT CHANGE UP (ref 4–40)
B-OH-BUTYR SERPL-SCNC: <0 MMOL/L — SIGNIFICANT CHANGE UP (ref 0–0.4)
BACTERIA # UR AUTO: NEGATIVE /HPF — SIGNIFICANT CHANGE UP
BILIRUB SERPL-MCNC: 0.6 MG/DL — SIGNIFICANT CHANGE UP (ref 0.2–1.2)
BILIRUB UR-MCNC: NEGATIVE — SIGNIFICANT CHANGE UP
BUN SERPL-MCNC: 13 MG/DL — SIGNIFICANT CHANGE UP (ref 7–23)
CALCIUM SERPL-MCNC: 9.7 MG/DL — SIGNIFICANT CHANGE UP (ref 8.4–10.5)
CAST: 0 /LPF — SIGNIFICANT CHANGE UP (ref 0–4)
CHLORIDE SERPL-SCNC: 95 MMOL/L — LOW (ref 98–107)
CO2 SERPL-SCNC: 23 MMOL/L — SIGNIFICANT CHANGE UP (ref 22–31)
COLOR SPEC: YELLOW — SIGNIFICANT CHANGE UP
CREAT SERPL-MCNC: 1.1 MG/DL — SIGNIFICANT CHANGE UP (ref 0.5–1.3)
CRP SERPL-MCNC: 186.7 MG/L — HIGH
DIFF PNL FLD: ABNORMAL
EGFR: 77 ML/MIN/1.73M2 — SIGNIFICANT CHANGE UP
GAS PNL BLDV: SIGNIFICANT CHANGE UP
GLUCOSE SERPL-MCNC: 221 MG/DL — HIGH (ref 70–99)
GLUCOSE UR QL: 500 MG/DL
HCT VFR BLD CALC: 43.7 % — SIGNIFICANT CHANGE UP (ref 39–50)
HGB BLD-MCNC: 14.4 G/DL — SIGNIFICANT CHANGE UP (ref 13–17)
KETONES UR-MCNC: ABNORMAL MG/DL
LEUKOCYTE ESTERASE UR-ACNC: NEGATIVE — SIGNIFICANT CHANGE UP
MCHC RBC-ENTMCNC: 28.6 PG — SIGNIFICANT CHANGE UP (ref 27–34)
MCHC RBC-ENTMCNC: 33 GM/DL — SIGNIFICANT CHANGE UP (ref 32–36)
MCV RBC AUTO: 86.7 FL — SIGNIFICANT CHANGE UP (ref 80–100)
NITRITE UR-MCNC: NEGATIVE — SIGNIFICANT CHANGE UP
PH UR: 6 — SIGNIFICANT CHANGE UP (ref 5–8)
PLATELET # BLD AUTO: 326 K/UL — SIGNIFICANT CHANGE UP (ref 150–400)
POTASSIUM SERPL-MCNC: 4.3 MMOL/L — SIGNIFICANT CHANGE UP (ref 3.5–5.3)
POTASSIUM SERPL-SCNC: 4.3 MMOL/L — SIGNIFICANT CHANGE UP (ref 3.5–5.3)
PROT SERPL-MCNC: 8.5 G/DL — HIGH (ref 6–8.3)
PROT UR-MCNC: 100 MG/DL
RBC # BLD: 5.04 M/UL — SIGNIFICANT CHANGE UP (ref 4.2–5.8)
RBC # FLD: 13.2 % — SIGNIFICANT CHANGE UP (ref 10.3–14.5)
RBC CASTS # UR COMP ASSIST: 1 /HPF — SIGNIFICANT CHANGE UP (ref 0–4)
SODIUM SERPL-SCNC: 133 MMOL/L — LOW (ref 135–145)
SP GR SPEC: 1.02 — SIGNIFICANT CHANGE UP (ref 1–1.03)
SQUAMOUS # UR AUTO: 0 /HPF — SIGNIFICANT CHANGE UP (ref 0–5)
UROBILINOGEN FLD QL: 0.2 MG/DL — SIGNIFICANT CHANGE UP (ref 0.2–1)
WBC UR QL: 1 /HPF — SIGNIFICANT CHANGE UP (ref 0–5)

## 2024-09-11 PROCEDURE — 99285 EMERGENCY DEPT VISIT HI MDM: CPT

## 2024-09-11 PROCEDURE — 73590 X-RAY EXAM OF LOWER LEG: CPT | Mod: 26,LT

## 2024-09-11 PROCEDURE — 71046 X-RAY EXAM CHEST 2 VIEWS: CPT | Mod: 26

## 2024-09-11 RX ORDER — CLINDAMYCIN PHOSPHATE 150 MG/ML
600 VIAL (ML) INJECTION ONCE
Refills: 0 | Status: COMPLETED | OUTPATIENT
Start: 2024-09-11 | End: 2024-09-11

## 2024-09-11 RX ORDER — VANCOMYCIN/0.9 % SOD CHLORIDE 1.75G/25
1000 PLASTIC BAG, INJECTION (ML) INTRAVENOUS ONCE
Refills: 0 | Status: COMPLETED | OUTPATIENT
Start: 2024-09-11 | End: 2024-09-11

## 2024-09-11 RX ORDER — ACETAMINOPHEN 325 MG/1
1000 TABLET ORAL ONCE
Refills: 0 | Status: COMPLETED | OUTPATIENT
Start: 2024-09-11 | End: 2024-09-11

## 2024-09-11 RX ORDER — PIPERACILLIN SODIUM AND TAZOBACTAM SODIUM 3; .375 G/15ML; G/15ML
3.38 INJECTION, POWDER, FOR SOLUTION INTRAVENOUS ONCE
Refills: 0 | Status: COMPLETED | OUTPATIENT
Start: 2024-09-11 | End: 2024-09-11

## 2024-09-11 RX ORDER — SODIUM CHLORIDE 9 MG/ML
1000 INJECTION INTRAMUSCULAR; INTRAVENOUS; SUBCUTANEOUS ONCE
Refills: 0 | Status: COMPLETED | OUTPATIENT
Start: 2024-09-11 | End: 2024-09-11

## 2024-09-11 RX ADMIN — PIPERACILLIN SODIUM AND TAZOBACTAM SODIUM 200 GRAM(S): 3; .375 INJECTION, POWDER, FOR SOLUTION INTRAVENOUS at 23:40

## 2024-09-11 RX ADMIN — ACETAMINOPHEN 400 MILLIGRAM(S): 325 TABLET ORAL at 23:40

## 2024-09-11 NOTE — ED ADULT TRIAGE NOTE - CHIEF COMPLAINT QUOTE
Pt. c/o left foot swelling and pain since yesterday. Wound noticed to bottom of the foot. Pt. febrile in triage. hx of DM, HTN, CVA

## 2024-09-11 NOTE — ED ADULT NURSE NOTE - OBJECTIVE STATEMENT
Pt received to room 1, A&Ox4, ambulatory at baseline. Pt presenting to the ED today complaining of left foot pain. Pt states the pain began 4 days ago. Upon assessment, pt left foot is swollen and warm to touch. Pt denies trauma or injury to the site. Left foot skin is intact. Pt febrile on assessment. Pt endorsing fever and chills ongoing from home. Pt denies c/p, abd pain, SOB, headache, n/v/d, blurry vision, or dizziness. Respirations even and unlabored. NSR on cardiac monitor. 20G IV placed in the right AC, labs drawn and sent. Pt medicated as per MD orders. Comfort measures provided, safety maintained. Plan of care ongoing.

## 2024-09-12 ENCOUNTER — TRANSCRIPTION ENCOUNTER (OUTPATIENT)
Age: 59
End: 2024-09-12

## 2024-09-12 VITALS
HEART RATE: 99 BPM | TEMPERATURE: 98 F | RESPIRATION RATE: 18 BRPM | OXYGEN SATURATION: 99 % | SYSTOLIC BLOOD PRESSURE: 135 MMHG | DIASTOLIC BLOOD PRESSURE: 70 MMHG

## 2024-09-12 DIAGNOSIS — E11.9 TYPE 2 DIABETES MELLITUS WITHOUT COMPLICATIONS: ICD-10-CM

## 2024-09-12 DIAGNOSIS — L03.90 CELLULITIS, UNSPECIFIED: ICD-10-CM

## 2024-09-12 DIAGNOSIS — Z29.9 ENCOUNTER FOR PROPHYLACTIC MEASURES, UNSPECIFIED: ICD-10-CM

## 2024-09-12 DIAGNOSIS — A41.9 SEPSIS, UNSPECIFIED ORGANISM: ICD-10-CM

## 2024-09-12 DIAGNOSIS — L08.9 LOCAL INFECTION OF THE SKIN AND SUBCUTANEOUS TISSUE, UNSPECIFIED: ICD-10-CM

## 2024-09-12 DIAGNOSIS — I63.9 CEREBRAL INFARCTION, UNSPECIFIED: ICD-10-CM

## 2024-09-12 LAB
ANION GAP SERPL CALC-SCNC: 16 MMOL/L — HIGH (ref 7–14)
ANISOCYTOSIS BLD QL: SLIGHT — SIGNIFICANT CHANGE UP
APTT BLD: 33.6 SEC — SIGNIFICANT CHANGE UP (ref 24.5–35.6)
BASOPHILS # BLD AUTO: 0 K/UL — SIGNIFICANT CHANGE UP (ref 0–0.2)
BASOPHILS NFR BLD AUTO: 0 % — SIGNIFICANT CHANGE UP (ref 0–2)
BUN SERPL-MCNC: 13 MG/DL — SIGNIFICANT CHANGE UP (ref 7–23)
CALCIUM SERPL-MCNC: 9.1 MG/DL — SIGNIFICANT CHANGE UP (ref 8.4–10.5)
CHLORIDE SERPL-SCNC: 98 MMOL/L — SIGNIFICANT CHANGE UP (ref 98–107)
CO2 SERPL-SCNC: 17 MMOL/L — LOW (ref 22–31)
CREAT SERPL-MCNC: 0.99 MG/DL — SIGNIFICANT CHANGE UP (ref 0.5–1.3)
EGFR: 87 ML/MIN/1.73M2 — SIGNIFICANT CHANGE UP
EOSINOPHIL # BLD AUTO: 0 K/UL — SIGNIFICANT CHANGE UP (ref 0–0.5)
EOSINOPHIL NFR BLD AUTO: 0 % — SIGNIFICANT CHANGE UP (ref 0–6)
ERYTHROCYTE [SEDIMENTATION RATE] IN BLOOD: 53 MM/HR — HIGH (ref 1–15)
FLUAV AG NPH QL: SIGNIFICANT CHANGE UP
FLUBV AG NPH QL: SIGNIFICANT CHANGE UP
GAS PNL BLDV: SIGNIFICANT CHANGE UP
GIANT PLATELETS BLD QL SMEAR: PRESENT — SIGNIFICANT CHANGE UP
GLUCOSE BLDC GLUCOMTR-MCNC: 265 MG/DL — HIGH (ref 70–99)
GLUCOSE SERPL-MCNC: 190 MG/DL — HIGH (ref 70–99)
HCT VFR BLD CALC: 42 % — SIGNIFICANT CHANGE UP (ref 39–50)
HGB BLD-MCNC: 13.7 G/DL — SIGNIFICANT CHANGE UP (ref 13–17)
IANC: 21.41 K/UL — HIGH (ref 1.8–7.4)
INR BLD: 1.21 RATIO — HIGH (ref 0.85–1.18)
LYMPHOCYTES # BLD AUTO: 0.89 K/UL — LOW (ref 1–3.3)
LYMPHOCYTES # BLD AUTO: 3.5 % — LOW (ref 13–44)
MANUAL SMEAR VERIFICATION: SIGNIFICANT CHANGE UP
MCHC RBC-ENTMCNC: 28.5 PG — SIGNIFICANT CHANGE UP (ref 27–34)
MCHC RBC-ENTMCNC: 32.6 GM/DL — SIGNIFICANT CHANGE UP (ref 32–36)
MCV RBC AUTO: 87.5 FL — SIGNIFICANT CHANGE UP (ref 80–100)
MONOCYTES # BLD AUTO: 2.65 K/UL — HIGH (ref 0–0.9)
MONOCYTES NFR BLD AUTO: 10.4 % — SIGNIFICANT CHANGE UP (ref 2–14)
NEUTROPHILS # BLD AUTO: 21.7 K/UL — HIGH (ref 1.8–7.4)
NEUTROPHILS NFR BLD AUTO: 83.5 % — HIGH (ref 43–77)
NEUTS BAND # BLD: 1.7 % — SIGNIFICANT CHANGE UP (ref 0–6)
NRBC # BLD: 0 /100 WBCS — SIGNIFICANT CHANGE UP (ref 0–0)
NRBC # FLD: 0 K/UL — SIGNIFICANT CHANGE UP (ref 0–0)
PLAT MORPH BLD: ABNORMAL
PLATELET # BLD AUTO: 299 K/UL — SIGNIFICANT CHANGE UP (ref 150–400)
PLATELET COUNT - ESTIMATE: NORMAL — SIGNIFICANT CHANGE UP
POLYCHROMASIA BLD QL SMEAR: SLIGHT — SIGNIFICANT CHANGE UP
POTASSIUM SERPL-MCNC: 4.8 MMOL/L — SIGNIFICANT CHANGE UP (ref 3.5–5.3)
POTASSIUM SERPL-SCNC: 4.8 MMOL/L — SIGNIFICANT CHANGE UP (ref 3.5–5.3)
PROCALCITONIN SERPL-MCNC: 0.3 NG/ML — HIGH (ref 0.02–0.1)
PROTHROM AB SERPL-ACNC: 13.6 SEC — HIGH (ref 9.5–13)
RBC # BLD: 4.8 M/UL — SIGNIFICANT CHANGE UP (ref 4.2–5.8)
RBC # FLD: 13 % — SIGNIFICANT CHANGE UP (ref 10.3–14.5)
RBC BLD AUTO: SIGNIFICANT CHANGE UP
RSV RNA NPH QL NAA+NON-PROBE: SIGNIFICANT CHANGE UP
SARS-COV-2 RNA SPEC QL NAA+PROBE: SIGNIFICANT CHANGE UP
SODIUM SERPL-SCNC: 131 MMOL/L — LOW (ref 135–145)
VARIANT LYMPHS # BLD: 0.9 % — SIGNIFICANT CHANGE UP (ref 0–6)
WBC # BLD: 25.47 K/UL — HIGH (ref 3.8–10.5)
WBC # BLD: 27.73 K/UL — HIGH (ref 3.8–10.5)
WBC # FLD AUTO: 25.47 K/UL — HIGH (ref 3.8–10.5)
WBC # FLD AUTO: 27.73 K/UL — HIGH (ref 3.8–10.5)

## 2024-09-12 PROCEDURE — 73630 X-RAY EXAM OF FOOT: CPT | Mod: 26,LT

## 2024-09-12 PROCEDURE — 99223 1ST HOSP IP/OBS HIGH 75: CPT

## 2024-09-12 PROCEDURE — 73701 CT LOWER EXTREMITY W/DYE: CPT | Mod: 26,LT,MC

## 2024-09-12 RX ORDER — INSULIN GLARGINE 100 [IU]/ML
15 INJECTION, SOLUTION SUBCUTANEOUS EVERY MORNING
Refills: 0 | Status: DISCONTINUED | OUTPATIENT
Start: 2024-09-12 | End: 2024-09-12

## 2024-09-12 RX ORDER — AMOXICILLIN AND CLAVULANATE POTASSIUM 250; 125 MG/1; MG/1
875 TABLET, FILM COATED ORAL
Qty: 14 | Refills: 0
Start: 2024-09-12 | End: 2024-09-18

## 2024-09-12 RX ORDER — DEXTROSE 15 G/33 G
25 GEL IN PACKET (GRAM) ORAL ONCE
Refills: 0 | Status: DISCONTINUED | OUTPATIENT
Start: 2024-09-12 | End: 2024-09-12

## 2024-09-12 RX ORDER — DEXTROSE 15 G/33 G
12.5 GEL IN PACKET (GRAM) ORAL ONCE
Refills: 0 | Status: DISCONTINUED | OUTPATIENT
Start: 2024-09-12 | End: 2024-09-12

## 2024-09-12 RX ORDER — ACETAMINOPHEN 325 MG/1
1000 TABLET ORAL EVERY 8 HOURS
Refills: 0 | Status: DISCONTINUED | OUTPATIENT
Start: 2024-09-12 | End: 2024-09-12

## 2024-09-12 RX ORDER — VANCOMYCIN/0.9 % SOD CHLORIDE 1.75G/25
1000 PLASTIC BAG, INJECTION (ML) INTRAVENOUS EVERY 12 HOURS
Refills: 0 | Status: DISCONTINUED | OUTPATIENT
Start: 2024-09-12 | End: 2024-09-12

## 2024-09-12 RX ORDER — DEXTROSE 15 G/33 G
15 GEL IN PACKET (GRAM) ORAL ONCE
Refills: 0 | Status: DISCONTINUED | OUTPATIENT
Start: 2024-09-12 | End: 2024-09-12

## 2024-09-12 RX ORDER — CLINDAMYCIN PHOSPHATE 150 MG/ML
600 VIAL (ML) INJECTION EVERY 8 HOURS
Refills: 0 | Status: DISCONTINUED | OUTPATIENT
Start: 2024-09-12 | End: 2024-09-12

## 2024-09-12 RX ORDER — ASPIRIN 81 MG
81 TABLET, DELAYED RELEASE (ENTERIC COATED) ORAL DAILY
Refills: 0 | Status: DISCONTINUED | OUTPATIENT
Start: 2024-09-12 | End: 2024-09-12

## 2024-09-12 RX ORDER — SODIUM CHLORIDE 9 MG/ML
500 INJECTION INTRAMUSCULAR; INTRAVENOUS; SUBCUTANEOUS ONCE
Refills: 0 | Status: COMPLETED | OUTPATIENT
Start: 2024-09-12 | End: 2024-09-12

## 2024-09-12 RX ORDER — GLUCAGON INJECTION, SOLUTION 1 MG/.2ML
1 INJECTION, SOLUTION SUBCUTANEOUS ONCE
Refills: 0 | Status: DISCONTINUED | OUTPATIENT
Start: 2024-09-12 | End: 2024-09-12

## 2024-09-12 RX ORDER — ENOXAPARIN SODIUM 100 MG/ML
40 INJECTION SUBCUTANEOUS EVERY 24 HOURS
Refills: 0 | Status: DISCONTINUED | OUTPATIENT
Start: 2024-09-12 | End: 2024-09-12

## 2024-09-12 RX ORDER — PIPERACILLIN SODIUM AND TAZOBACTAM SODIUM 3; .375 G/15ML; G/15ML
3.38 INJECTION, POWDER, FOR SOLUTION INTRAVENOUS EVERY 8 HOURS
Refills: 0 | Status: DISCONTINUED | OUTPATIENT
Start: 2024-09-12 | End: 2024-09-12

## 2024-09-12 RX ADMIN — Medication 250 MILLIGRAM(S): at 02:16

## 2024-09-12 RX ADMIN — ACETAMINOPHEN 1000 MILLIGRAM(S): 325 TABLET ORAL at 07:26

## 2024-09-12 RX ADMIN — SODIUM CHLORIDE 1000 MILLILITER(S): 9 INJECTION INTRAMUSCULAR; INTRAVENOUS; SUBCUTANEOUS at 00:39

## 2024-09-12 RX ADMIN — Medication 100 MILLIGRAM(S): at 00:37

## 2024-09-12 NOTE — ED PROVIDER NOTE - OBJECTIVE STATEMENT
59-year-old male with past medical history of diabetes, on insulin, history of CVA with left-sided weakness, decree sensation in left leg, presenting with fever and left foot pain.  Patient reports that yesterday, he developed pain on the sole of his foot.  Today came in due to worsening pain in foot, now on dorsal aspect and going up into ankle and leg.  Has been having fevers, chills, nausea.  Denies any abdominal pain, chest pain, shortness of breath, dysuria, increased urinary frequency.

## 2024-09-12 NOTE — H&P ADULT - NSHPREVIEWOFSYSTEMS_GEN_ALL_CORE
REVIEW OF SYSTEMS:    CONSTITUTIONAL: (+) fever  EYES/ENT: No visual changes; No dysphagia; No sore throat; No rhinorrhea; No sinus pain/pressure  NECK: No pain or stiffness  RESPIRATORY: No cough, wheezing, hemoptysis; No shortness of breath  CARDIOVASCULAR: No chest pain or palpitations; No lower extremity edema  GASTROINTESTINAL: No abdominal or epigastric pain. No nausea, vomiting, or hematemesis; No diarrhea or constipation. No melena or hematochezia.  GENITOURINARY: No dysuria, frequency or hematuria  NEUROLOGICAL: No numbness, paresthesias, or weakness; No HA; No LH/dizziness  MSK: (+) pain in L forefoot  SKIN: No itching, burning, rashes, or lesions   All other review of systems is negative unless indicated above.

## 2024-09-12 NOTE — DISCHARGE NOTE PROVIDER - CARE PROVIDER_API CALL
Darvin Brice  Internal Medicine  [id number] [address] [place] [id number]  Phone: [phone]  Fax: [phone]  Established Patient  Follow Up Time: 1-3 days

## 2024-09-12 NOTE — ED ADULT NURSE REASSESSMENT NOTE - NS ED NURSE REASSESS COMMENT FT1
Pt refused medications, pt signing out AMA, ACP NP Izack aware
Pt received from night RN, respirations even and unlabored, neurologically intact, pt wanting to be discharged, ACP NP Karmen notified, denying any medication until speaking to doctor. Educated on importance of medication and will like to be discharged and take medication at home. Safety maintained.

## 2024-09-12 NOTE — H&P ADULT - PROBLEM SELECTOR PLAN 1
- podiatry consulted, appreciate recs  - vascular surgery consulted, appreciate recs  - c/w vanc/zosyn/clinda; monitor vanc trough  - follow up cx  - follow up repeat lactate  - trend wbc/fever curve - podiatry consulted, appreciate recs; patient refused bedside I&D for focal gas collection, opting to stay admitted for IV abx for 24hrs and then return next week for intervention  - vascular surgery consulted, appreciate recs  - c/w vanc/zosyn/clinda; monitor vanc trough  - follow up cx  - follow up repeat lactate  - trend wbc/fever curve

## 2024-09-12 NOTE — ED PROVIDER NOTE - CLINICAL SUMMARY MEDICAL DECISION MAKING FREE TEXT BOX
59-year-old male with past medical history of diabetes, on insulin, history of CVA with left-sided weakness, decree sensation in left leg, presenting with fever and left foot pain.      Vital signs in ED show tachycardia and fever with temperature to 102.    GENERAL: NAD, lying in bed comfortably  HEAD:  Atraumatic, Normocephalic  EYES: EOMI, conjunctiva and sclera clear  ENT: Moist mucous membranes  CHEST/LUNG: Unlabored respirations. Clear to auscultation bilaterally. No rales, rhonchi, wheezing, or rubs  HEART: Regular rate and rhythm. No murmurs, rubs, or gallops  ABDOMEN: Soft, nondistended, nontender  EXTREMITIES:  +swelling, erythema on dorsum of L foot, ttp, +hot to touch. Entire left leg hot to touch. No crepitus. Small old wound between toes 1 and 2. No purulence  NERVOUS SYSTEM:  No focal deficits.    ddx include but not limited to cellulitis vs nec fasc, consider other infectious etiology including uti, pna. Will assess for dka

## 2024-09-12 NOTE — CONSULT NOTE ADULT - ASSESSMENT
A 59 year old male with PMHx of HTN, DM on insulin, stroke presents to ED with left foot pain. CT scan and imaging shows a foreign body with air tracking at the base of the first hallux. Vascular surgery is consulted to rule out necrotiziing soft tissue infection.     Recommendations:  - Left foot is erythematous and swollen with an open wound where the air is tracking.  - Podiatry consult for foot wound    C-Team  [id number]

## 2024-09-12 NOTE — ED PROVIDER NOTE - PROGRESS NOTE DETAILS
Maye Rendon, PGY-3: pt seen by surgery and podiatry.     podiatry resident Desiree Wilhelm at bedside. She recommended to patient I&D of dorsum of foot, however patient does not want at this time. Would like time to think about it and discuss with family in the morning. explained to patient that the more he waits for intervention, the worse the infection could become, and the increased opportunity to lose foot. Patient reports he understands risks but still wants more time to think about procedure.    However, patient amenable for admission for antibiotics Maye Rendon, PGY-3: admission to Dr. Haywood requested by Dr. Waterhouse (podiatry) The patient is a 59y Male who has a past medical and surgery history of HTN HLD CVA Diabetes/Neuropathy PTED with fever and pain in left leg with swelling and erythema of the left foot Pt with neuropathy and thinks he might have gotten FB stuck in foot    Vital Signs Last 24 Hrs  T(F): 98.6 HR: 96 BP: 118/75 RR: 20 SpO2: 98% (12 Sep 2024 02:16)   PE: as described;   DATA:    LAB:   Blood Gas Profile w/Lytes - Venous: Performed In Lab (09-11-24 @ 23:03)  Blood Gas Venous - Lactate: 3.1 mmol/L (09-11-24 @ 23:03)                        14.4   25.47 )-----------( 326      ( 11 Sep 2024 23:25 )             43.7   PT: 13.6 sec;   INR: 1.21 ratio  PTT:33.6 [id number]    133<L>  |  95<L>  |  13  ----------------------------<  221<H>  4.3   |  23  |  1.10    Ca    9.7      11 Sep 2024 23:25    TPro  8.5<H>  /  Alb  4.3  /  TBili  0.6  /  DBili  x   /  AST  34  /  ALT  34  /  AlkPhos  55  09-11     Foot XR FB seen on Ap removed at bedside by podiatry   CT LE IMPRESSION:  1.   Mildly prominent left inguinal lymphadenopathy.  2.   Some subcutaneous edema is noted in the foot, no fluid collection   abscess  or other acute process identified.    IMPRESSION/RISK:  Dx= foreign body left foot c/b infection   Consideration include Pt unreliable to f/u outpt will require ID antibiotics. Permitted removal of foreign body wishes to "think about"complete  I and d of foot will require admission for same  Plan  continue vanc zosyn clinda/ would d/c clinda at this point but defer to inpt team   seen by both podiatry and vascular IVETTE at this time but do verify need for antibiotic therapy  Reassess

## 2024-09-12 NOTE — H&P ADULT - NSHPLABSRESULTS_GEN_ALL_CORE
14.4   25.47 )-----------( 326      ( 11 Sep 2024 23:25 )             43.7     133<L>  |  95<L>  |  13  ----------------------------<  221<H>       4.3   |  23  |  1.10    Ca    9.7      11 Sep 2024 23:25    TPro  8.5<H>  /  Alb  4.3  /  TBili  0.6  /  DBili  x   /  AST  34  /  ALT  34  /  AlkPhos  55      PT/INR: 13.6/1.21 (24 @ 23:25)  PTT: 33.6 (24 @ 23:25)      23:03 - VBG - pH: 7.38  | pCO2: 44    | pO2: 27    | Lactate: 3.1      Procalcitonin - 0.30 (24 @ 23:25)      Urinalysis Basic - ( 11 Sep 2024 23:00 )  Color: Yellow / Appearance: Clear / S.019 / pH: 6.0  Gluc: 500 mg/dL / Ketone: Trace mg/dL  / Bili: Negative / Urobili: 0.2 mg/dL   Blood: Trace / Protein: 100 mg/dL / Nitrite: Negative   Leuk Esterase: Negative / RBC: 1 /HPF / WBC 1 /HPF   Sq Epi: 0 /HPF / Bacteria: Negative /HPF  Hyaline Casts: x/WBC Casts: x      Urinalysis with Rflx Culture (collected 11 Sep 2024 23:00)    CT LLE (prelim radiology read):  IMPRESSION:  1.   Mildly prominent left inguinal lymphadenopathy.  2.   Some subcutaneous edema is noted in the foot, no fluid collection   abscess or other acute process identified.    XRay L foot (prelim radiology read):  IMPRESSION:  Linear Foreign body within the soft tissues of the first interdigit space.    Soft tissue infection of the hallux and interdigit space centered around   the foreign body with edema and focal gas tracking, highly suspicious for   an underlying abscess.    Xray Tib/fib (prelim radiology read):  IMPRESSION:    No acute fracture or dislocation. No subcutaneous tracking gas.    CXR (prelim radiology read):  IMPRESSION:    No focal consolidation.

## 2024-09-12 NOTE — H&P ADULT - NSHPPHYSICALEXAM_GEN_ALL_CORE
PHYSICAL EXAM:  GENERAL: NAD  HEAD:  Atraumatic, Normocephalic  EYES: EOMI, PERRL, conjunctiva and sclera clear  NECK: Supple, No JVD  CHEST/LUNG: Clear to auscultation bilaterally; No wheezes, rales or rhonchi; normal work of breathing, speaking in full sentences  HEART: tachycardic; No murmurs, rubs, or gallops, (+)S1, S2  ABDOMEN: Soft, Nontender, Nondistended; Normal Bowel sounds   EXTREMITIES:  L foot puncture wound on plantar aspect near first digit w/ mild ttp (sensation decreased in LLE), dorsal aspect of foot w/ erythema, no purulence or drainage.    PSYCH: normal mood and affect, A&Ox3  NEUROLOGY: no focal neuro deficits  SKIN: No rashes or lesions

## 2024-09-12 NOTE — DISCHARGE NOTE PROVIDER - NSDCCPCAREPLAN_GEN_ALL_CORE_FT
PRINCIPAL DISCHARGE DIAGNOSIS  Diagnosis: Left foot infection  Assessment and Plan of Treatment: You have a foot infection including abscess that you refused to get drained. Your infections is concerning that can lead to sepsis, bone infection, loss of limb and death. You refused to get treated in the hospital and left Against Medical Advice.   Take antibiotics as prescribed and follow up with your PCP right away.      SECONDARY DISCHARGE DIAGNOSES  Diagnosis: CVA (cerebrovascular accident)  Assessment and Plan of Treatment: Take medications as instructed on discharge  Follow up with your PCP within 1 week    Diagnosis: Type 2 diabetes mellitus  Assessment and Plan of Treatment: Take medications as instructed on discharge  Follow up with your PCP within 1 week

## 2024-09-12 NOTE — H&P ADULT - PROBLEM SELECTOR PLAN 3
- start glargine 15 this AM  - ISS  - monitor FSGs On novolin 70/30 30U w/ breakfast, 15U w/ dinner  - start glargine 15 this AM  - ISS  - monitor FSGs

## 2024-09-12 NOTE — H&P ADULT - HISTORY OF PRESENT ILLNESS
59M PMHx T2DM on insulin, CVA with residual LLE weakness and numbness presenting with L foot pain.      Reports that for past 3 days, he developed pain on the sole of his foot.  Patient does not recall an trauma to the area.  States he went to PCP who noted foreign body puncture wound and recommended presentation to hospital.  Patient denies any purulence or drainage from area. Has been applying a topical ointment to his foot and soaking it in water for pain relief.  Has been having fevers, chills, nausea.  Denies any abdominal pain, chest pain, shortness of breath, dysuria, increased urinary frequency.    ED Course:  Febrile to 102.2, sinus tach to 120s, BP stable.  Labs notable for wbc 25K, , lactate 3.1.  Imaging notable for Xray L foot - linear roreign body, soft tissue infection of hallux and interdigit space w/ edema and focal gas tracking, LLE CT - subcutaneous edema is noted in foot, no fluid collection abscess or other acute process identified.  Vascular surgery and Podiatry consulted.  Foreign body removed by podiatry, attempted bedside I&D for drainage of localized gas however patient refused.  States he will be admitted for 24hrs for abx and return next week for intervention.

## 2024-09-12 NOTE — CONSULT NOTE ADULT - SUBJECTIVE AND OBJECTIVE BOX
Patient is a 59y old  Male who presents with a chief complaint of left foot wound    HPI:      PAST MEDICAL & SURGICAL HISTORY:  Diabetes      No significant past surgical history          MEDICATIONS  (STANDING):    MEDICATIONS  (PRN):      Allergies    No Known Allergies    Intolerances        VITALS:    Vital Signs Last 24 Hrs  T(C): 37 (12 Sep 2024 02:16), Max: 39.1 (11 Sep 2024 18:27)  T(F): 98.6 (12 Sep 2024 02:16), Max: 102.3 (11 Sep 2024 18:27)  HR: 96 (12 Sep 2024 02:16) (96 - 123)  BP: 118/75 (12 Sep 2024 02:16) (118/75 - 151/85)  BP(mean): --  RR: 20 (12 Sep 2024 02:16) (18 - 20)  SpO2: 98% (12 Sep 2024 02:16) (96% - 98%)    Parameters below as of 12 Sep 2024 02:16  Patient On (Oxygen Delivery Method): room air        LABS:                          14.4   25.47 )-----------( 326      ( 11 Sep 2024 23:25 )             43.7       09-11    133<L>  |  95<L>  |  13  ----------------------------<  221<H>  4.3   |  23  |  1.10    Ca    9.7      11 Sep 2024 23:25    TPro  8.5<H>  /  Alb  4.3  /  TBili  0.6  /  DBili  x   /  AST  34  /  ALT  34  /  AlkPhos  55  09-11      CAPILLARY BLOOD GLUCOSE      POCT Blood Glucose.: 162 mg/dL (11 Sep 2024 18:31)      PT/INR - ( 11 Sep 2024 23:25 )   PT: 13.6 sec;   INR: 1.21 ratio         PTT - ( 11 Sep 2024 23:25 )  PTT:33.6 sec    LOWER EXTREMITY PHYSICAL EXAM:    Vascular: DP/PT _/4, B/L, CFT <_ seconds B/L, Temperature gradient _, B/L.   Neuro: Epicritic sensation _ to the level of _, B/L.  Musculoskeletal/Ortho:  Skin:  Wound #1:   Location:  Size:  Depth:  Wound bed:   Drainage:   Odor:   Periwound:  Etiology:     RADIOLOGY & ADDITIONAL STUDIES:     Patient is a 59y old  Male who presents with a chief complaint of left foot wound    HPI: Patient reports noticing a black dot on his left foot and feeling burning sensation of his foot for the past day. Patient does not recall an trauma to the area. Patient has been applying a topical ointment to his foot and soaking it in water for pain relief.       PAST MEDICAL & SURGICAL HISTORY:  Diabetes      No significant past surgical history          MEDICATIONS  (STANDING):    MEDICATIONS  (PRN):      Allergies    No Known Allergies    Intolerances        VITALS:    Vital Signs Last 24 Hrs  T(C): 37 (12 Sep 2024 02:16), Max: 39.1 (11 Sep 2024 18:27)  T(F): 98.6 (12 Sep 2024 02:16), Max: 102.3 (11 Sep 2024 18:27)  HR: 96 (12 Sep 2024 02:16) (96 - 123)  BP: 118/75 (12 Sep 2024 02:16) (118/75 - 151/85)  BP(mean): --  RR: 20 (12 Sep 2024 02:16) (18 - 20)  SpO2: 98% (12 Sep 2024 02:16) (96% - 98%)    Parameters below as of 12 Sep 2024 02:16  Patient On (Oxygen Delivery Method): room air        LABS:                          14.4   25.47 )-----------( 326      ( 11 Sep 2024 23:25 )             43.7       09-11    133<L>  |  95<L>  |  13  ----------------------------<  221<H>  4.3   |  23  |  1.10    Ca    9.7      11 Sep 2024 23:25    TPro  8.5<H>  /  Alb  4.3  /  TBili  0.6  /  DBili  x   /  AST  34  /  ALT  34  /  AlkPhos  55  09-11      CAPILLARY BLOOD GLUCOSE      POCT Blood Glucose.: 162 mg/dL (11 Sep 2024 18:31)      PT/INR - ( 11 Sep 2024 23:25 )   PT: 13.6 sec;   INR: 1.21 ratio         PTT - ( 11 Sep 2024 23:25 )  PTT:33.6 sec    LOWER EXTREMITY PHYSICAL EXAM:    Vascular: DP/PT 2/4, B/L, CFT <3 seconds B/L, Temperature gradient warm to cool, B/L.   Neuro: Epicritic sensation diminished to the level of digits, B/L.  Musculoskeletal/Ortho: unremarkable  Skin: Left foot puncture wound of the 1st interspace just proximal to the digital sulcus, erythema extending to the dorsal foot, no purulence, no drainage, no malodor. Right foot no open wound, no signs of infection.       RADIOLOGY & ADDITIONAL STUDIES:    < from: CT Lower Extremity w/ IV Cont, Left (09.12.24 @ 01:18) >    ******PRELIMINARY REPORT******      ******PRELIMINARY REPORT******         ACC: [id number] EXAM:  CT LWR EXT IC LT   ORDERED BY: IRINEO RICHARDSON     PROCEDURE DATE:  09/12/2024    ******PRELIMINARY REPORT******      ******PRELIMINARY REPORT******           INTERPRETATION:  [facility] RADIOLOGIST PRELIMINARY REPORT    PROCEDURE INFORMATION:  Exam: CT Left Lower Extremity  Exam date and time: 9/12/2024 12:54 AM  Age: 59 years old  Clinical indication: Leg swelling, heat, HX dm    TECHNIQUE:  Imaging protocol: CT of the left lower extremity with intravenous   contrast was  performed.  Contrast material: IV: OMNIPAQUE 350; Contrast volume: 93 ml; Contrast   route:  IV;    COMPARISON:  CR XR TIBIA FIBULA AP AND LATERAL 2 VIEWS LEFT 9/11/2024 11:18 PM    FINDINGS:  Bones/joints: Normal. No acute fracture or dislocation.  Soft tissues: There is some mild subcutaneous edema is seen in the foot.  Lymph nodes: Prominent left inguinal lymph nodes are identified measuring   up to  14.5 mm in short axis.    IMPRESSION:  1.   Mildly prominent left inguinal lymphadenopathy.  2.   Some subcutaneous edema is noted in the foot, no fluid collection   abscess  or other acute process identified.        ******PRELIMINARY REPORT******      ******PRELIMINARY REPORT******         DEVIN MOSQUERA M.D.;AD RADIOLOGIST  This document is a PRELIMINARY interpretation and is pending final   attending approval. Sep 12 2024  4:08AM    < end of copied text >     Patient is a 59y old  Male who presents with a chief complaint of left foot wound    HPI: 59-year-old male with past medical history of diabetes, on insulin, history of CVA with left-sided weakness, decree sensation in left leg, presenting with fever and left foot pain.  Patient reports that yesterday, he developed pain on the sole of his foot.  Today came in due to worsening pain in foot, now on dorsal aspect and going up into ankle and leg. Patient does not recall an trauma to the area. Patient has been applying a topical ointment to his foot and soaking it in water for pain relief.  Has been having fevers, chills, nausea.  Denies any abdominal pain, chest pain, shortness of breath, dysuria, increased urinary frequency.    PAST MEDICAL & SURGICAL HISTORY:  Diabetes      No significant past surgical history          MEDICATIONS  (STANDING):    MEDICATIONS  (PRN):      Allergies    No Known Allergies    Intolerances        VITALS:    Vital Signs Last 24 Hrs  T(C): 37 (12 Sep 2024 02:16), Max: 39.1 (11 Sep 2024 18:27)  T(F): 98.6 (12 Sep 2024 02:16), Max: 102.3 (11 Sep 2024 18:27)  HR: 96 (12 Sep 2024 02:16) (96 - 123)  BP: 118/75 (12 Sep 2024 02:16) (118/75 - 151/85)  BP(mean): --  RR: 20 (12 Sep 2024 02:16) (18 - 20)  SpO2: 98% (12 Sep 2024 02:16) (96% - 98%)    Parameters below as of 12 Sep 2024 02:16  Patient On (Oxygen Delivery Method): room air        LABS:                          14.4   25.47 )-----------( 326      ( 11 Sep 2024 23:25 )             43.7       09-11    133<L>  |  95<L>  |  13  ----------------------------<  221<H>  4.3   |  23  |  1.10    Ca    9.7      11 Sep 2024 23:25    TPro  8.5<H>  /  Alb  4.3  /  TBili  0.6  /  DBili  x   /  AST  34  /  ALT  34  /  AlkPhos  55  09-11      CAPILLARY BLOOD GLUCOSE      POCT Blood Glucose.: 162 mg/dL (11 Sep 2024 18:31)      PT/INR - ( 11 Sep 2024 23:25 )   PT: 13.6 sec;   INR: 1.21 ratio         PTT - ( 11 Sep 2024 23:25 )  PTT:33.6 sec    LOWER EXTREMITY PHYSICAL EXAM:    Vascular: DP/PT 2/4, B/L, CFT <3 seconds B/L, Temperature gradient warm to cool, B/L.   Neuro: Epicritic sensation diminished to the level of digits, B/L.  Musculoskeletal/Ortho: unremarkable  Skin: Left foot puncture wound of the 1st interspace just proximal to the digital sulcus, erythema extending to the dorsal foot, no purulence, no drainage, no malodor. Right foot no open wound, no signs of infection.       RADIOLOGY & ADDITIONAL STUDIES:    < from: CT Lower Extremity w/ IV Cont, Left (09.12.24 @ 01:18) >    ******PRELIMINARY REPORT******      ******PRELIMINARY REPORT******         ACC: [id number] EXAM:  CT LWR EXT IC LT   ORDERED BY: IRINEO RICHARDSON     PROCEDURE DATE:  09/12/2024    ******PRELIMINARY REPORT******      ******PRELIMINARY REPORT******           INTERPRETATION:  [facility] RADIOLOGIST PRELIMINARY REPORT    PROCEDURE INFORMATION:  Exam: CT Left Lower Extremity  Exam date and time: 9/12/2024 12:54 AM  Age: 59 years old  Clinical indication: Leg swelling, heat, HX dm    TECHNIQUE:  Imaging protocol: CT of the left lower extremity with intravenous   contrast was  performed.  Contrast material: IV: OMNIPAQUE 350; Contrast volume: 93 ml; Contrast   route:  IV;    COMPARISON:  CR XR TIBIA FIBULA AP AND LATERAL 2 VIEWS LEFT 9/11/2024 11:18 PM    FINDINGS:  Bones/joints: Normal. No acute fracture or dislocation.  Soft tissues: There is some mild subcutaneous edema is seen in the foot.  Lymph nodes: Prominent left inguinal lymph nodes are identified measuring   up to  14.5 mm in short axis.    IMPRESSION:  1.   Mildly prominent left inguinal lymphadenopathy.  2.   Some subcutaneous edema is noted in the foot, no fluid collection   abscess  or other acute process identified.        ******PRELIMINARY REPORT******      ******PRELIMINARY REPORT******         DEVIN MOSQUERA M.D.;[facility] RADIOLOGIST  This document is a PRELIMINARY interpretation and is pending final   attending approval. Sep 12 2024  4:08AM    < end of copied text >

## 2024-09-12 NOTE — CONSULT NOTE ADULT - ASSESSMENT
59M presents with left foot wound  - Patient seen and evaluated  - T 102.2, WBC 25, ESR 53, CRP 18.67  - Left foot plantar foot wound to ____.   - Left foot xray: pending  - Left foot CT: pending  - Discussed with attending 59M presents with left foot wound  - Patient seen and evaluated  - T 102.2, WBC 25, ESR 53, CRP 18.67  - Left foot puncture wound of the 1st interspace just proximal to the digital sulcus, erythema extending to the dorsal foot, no purulence, no drainage, no malodor. Right foot no open wound, no signs of infection.   - Left foot x-ray Localized gas and foreign body of the 1st interspace (resident read).    - Left foot CT: pending  - Using a 15 blade, the superficial callus surrounding puncture wound was debrided to the level of dermis and not beyond and foreign body was removed. Attempted to do an incision and drainage to release the localized gas observed on x-ray but patient refused.  - Extensive discussion held with patient regarding the importance and necessity of an incision and drainage for localized gas of the 1st interspace given the x-ray findings in correlation with high WBC, ESR, CRP, temperature, and heart rate. Pt states he wound not like an incision and drainage of his foot at this time and states he will come back next week and do it since he has plans for this week. Risks and benefits were explained to the pt including but not limited to the proximal spreading of infection and possible proximal procedures, limb loss, and life threatening conditions. Patient demonstrated verbal understanding, and maintained decisions above.  - Recommend admission to medicine   - Recommend continued IV antibiotics - Vancomycin, Zosyn, Clindamycin  - Recommend ID consult  - Discussed with attending 59M presents with left foot wound  - Patient seen and evaluated  - T 102.2, WBC 25, ESR 53, CRP 18.67  - Left foot puncture wound of the 1st interspace just proximal to the digital sulcus, erythema extending to the dorsal foot, no purulence, no drainage, no malodor. Right foot no open wound, no signs of infection.   - Left foot x-ray Localized gas and foreign body of the 1st interspace (resident read).    - Left foot CT: pending  - Using a 15 blade, the superficial callus surrounding puncture wound was debrided to the level of dermis and not beyond and foreign body was removed. Attempted to do an incision and drainage to release the localized gas observed on x-ray but patient refused.  - Extensive discussion held with patient regarding the importance and necessity of an incision and drainage for localized gas of the 1st interspace given the x-ray findings in correlation with high WBC, ESR, CRP, temperature, and heart rate. Pt states he wound not like an incision and drainage of his foot at this time and states he will come back next week and do it since he has plans for this week. Risks and benefits were explained to the pt including but not limited to the proximal spreading of infection and possible proximal procedures, limb loss, and life threatening conditions. Patient demonstrated verbal understanding, and maintained decisions above.  - Recommend admission to medicine   - Recommend continued IV antibiotics - Vancomycin, Zosyn, Clindamycin  - Recommend ID consult  - Discussed with attending        Patient told he is septic, if no incision and drainage he will likely lose a larger portion of the foot and potentially the foot as a whole, and worse could die from sepsis.    He says he will "come back Monday" and told patient this makes no sense and would be a very stupid decision considering he is sick and has a collection of pus aggressively spreading within his foot. Plans to leave [facility].

## 2024-09-12 NOTE — H&P ADULT - ASSESSMENT
59M PMHx T2DM on insulin, CVA with residual LLE weakness and numbness who presents with L foot infection.

## 2024-09-12 NOTE — DISCHARGE NOTE PROVIDER - HOSPITAL COURSE
HPI:  59M PMHx T2DM on insulin, CVA with residual LLE weakness and numbness presenting with L foot pain.      Reports that for past 3 days, he developed pain on the sole of his foot.  Patient does not recall an trauma to the area.  States he went to PCP who noted foreign body puncture wound and recommended presentation to hospital.  Patient denies any purulence or drainage from area. Has been applying a topical ointment to his foot and soaking it in water for pain relief.  Has been having fevers, chills, nausea.  Denies any abdominal pain, chest pain, shortness of breath, dysuria, increased urinary frequency.    ED Course:  Febrile to 102.2, sinus tach to 120s, BP stable.  Labs notable for wbc 25K, , lactate 3.1.  Imaging notable for Xray L foot - linear roreign body, soft tissue infection of hallux and interdigit space w/ edema and focal gas tracking, LLE CT - subcutaneous edema is noted in foot, no fluid collection abscess or other acute process identified.  Vascular surgery and Podiatry consulted.  Foreign body removed by podiatry, attempted bedside I&D for drainage of localized gas however patient refused.  States he will be admitted for 24hrs for abx and return next week for intervention.     (12 Sep 2024 05:55)      HOSPITAL COURSE:  Vital Signs Last 24 Hrs  T(C): 36.6 (12 Sep 2024 10:05), Max: 39.1 (11 Sep 2024 18:27)  T(F): 97.9 (12 Sep 2024 10:05), Max: 102.3 (11 Sep 2024 18:27)  HR: 99 (12 Sep 2024 10:05) (90 - 123)  BP: 135/70 (12 Sep 2024 10:05) (118/75 - 151/85)  BP(mean): 86 (12 Sep 2024 10:05) (86 - 86)  RR: 18 (12 Sep 2024 10:05) (18 - 20)  SpO2: 99% (12 Sep 2024 10:05) (96% - 99%)    Parameters below as of 12 Sep 2024 10:05  Patient On (Oxygen Delivery Method): room air      #Left foot infection.   - podiatry consulted, appreciate recs; patient refused bedside I&D for focal gas collection, opting to stay admitted for IV abx for 24hrs and then return next week for intervention  - vascular surgery consulted, appreciate recs  - c/w vanc/zosyn/clinda; monitor vanc trough  - follow up cx  - follow up repeat lactate  - trend wbc/fever curve.    Called by nurse to evaluate patient who wishes to leave the hospital against medical advice. Patient is A&O x4 and has full capacity to make his or her own medical decisions. Pt was informed of their medical conditions, benefits, and alternatives to treatment as well as the risks of refusing treatment and the seriousness of leaving against medical advice such as the risks of  SEPSIS leading to foot amputation, heart attack, stroke, seizure, and even death were fully explained to the patient.  After expressing full understanding, patient then signs out against medical advice witnessed by the nurse.  Attending made aware.

## 2024-09-12 NOTE — ED PROVIDER NOTE - ATTENDING CONTRIBUTION TO CARE
58 yo M hx DM2 on insulin, CVA with L residual weakness, LLE numbness. presenting for evaluation of pain to LLE and fevers/chills. Pt reports pain x 3 days with increased pain/swelling.     VITALS: reviewed  GEN: NAD, A & O x 4  HEAD/EYES: NCAT, EOMI, anicteric sclerae,   ENT: mucus membranes moist, oropharynx WNL, trachea midline,  RESP: lungs CTA with equal breath sounds bilaterally, chest wall nontender and atraumatic  CV: heart with reg rhythm S1, S2, distal pulses intact and symmetric bilaterally  ABDOMEN: normoactive bowel sounds, soft, nondistended, nontender, no palpable masses  : no CVAT  MSK: extremities atraumatic. Erythema, warmth to LLE, minimal tenderness 2/2 decreased sensation, no crepitus, no streaking   SKIN: warm, dry, no rash, no bruising, no cyanosis. color appropriate for ethnicity  NEURO: alert, mentating appropriately, no facial asymmetry.   PSYCH: Affect appropriate    No crepitus, but given systemic symptoms, concern for necrotizing fasciitis. Plan for sepsis labs, meds, fluids, CT LLE, xr, likely surgery c/s and admit
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
## 2024-09-12 NOTE — DISCHARGE NOTE PROVIDER - NSDCMRMEDTOKEN_GEN_ALL_CORE_FT
amoxicillin-clavulanate 875 mg-125 mg oral tablet: 875 milligram(s) orally 2 times a day  aspirin 81 mg oral delayed release tablet: 1 tab(s) orally once a day  atorvastatin 80 mg oral tablet: 1 tab(s) orally once a day (at bedtime)  clopidogrel 75 mg oral tablet: 1 tab(s) orally once a day  
no

## 2024-09-15 ENCOUNTER — INPATIENT (INPATIENT)
Facility: HOSPITAL | Age: 59
LOS: 11 days | Discharge: HOME HEALTH | End: 2024-09-27
Attending: INTERNAL MEDICINE | Admitting: INTERNAL MEDICINE
Payer: MEDICAID

## 2024-09-15 VITALS
SYSTOLIC BLOOD PRESSURE: 154 MMHG | HEIGHT: 64 IN | WEIGHT: 169.98 LBS | DIASTOLIC BLOOD PRESSURE: 77 MMHG | TEMPERATURE: 98 F | RESPIRATION RATE: 18 BRPM | OXYGEN SATURATION: 98 % | HEART RATE: 104 BPM

## 2024-09-15 LAB
ALBUMIN SERPL ELPH-MCNC: 3.6 G/DL — SIGNIFICANT CHANGE UP (ref 3.3–5)
ALP SERPL-CCNC: 68 U/L — SIGNIFICANT CHANGE UP (ref 40–120)
ALT FLD-CCNC: 65 U/L — HIGH (ref 4–41)
ANION GAP SERPL CALC-SCNC: 12 MMOL/L — SIGNIFICANT CHANGE UP (ref 7–14)
APTT BLD: 36.2 SEC — HIGH (ref 24.5–35.6)
AST SERPL-CCNC: 41 U/L — HIGH (ref 4–40)
BILIRUB SERPL-MCNC: 0.4 MG/DL — SIGNIFICANT CHANGE UP (ref 0.2–1.2)
BUN SERPL-MCNC: 14 MG/DL — SIGNIFICANT CHANGE UP (ref 7–23)
CALCIUM SERPL-MCNC: 9.4 MG/DL — SIGNIFICANT CHANGE UP (ref 8.4–10.5)
CHLORIDE SERPL-SCNC: 97 MMOL/L — LOW (ref 98–107)
CO2 SERPL-SCNC: 22 MMOL/L — SIGNIFICANT CHANGE UP (ref 22–31)
CREAT SERPL-MCNC: 0.95 MG/DL — SIGNIFICANT CHANGE UP (ref 0.5–1.3)
CRP SERPL-MCNC: 185.5 MG/L — HIGH
EGFR: 92 ML/MIN/1.73M2 — SIGNIFICANT CHANGE UP
GLUCOSE SERPL-MCNC: 198 MG/DL — HIGH (ref 70–99)
HCT VFR BLD CALC: 38.6 % — LOW (ref 39–50)
HGB BLD-MCNC: 12.7 G/DL — LOW (ref 13–17)
IANC: 16.97 K/UL — HIGH (ref 1.8–7.4)
INR BLD: 1.25 RATIO — HIGH (ref 0.85–1.18)
MCHC RBC-ENTMCNC: 27.9 PG — SIGNIFICANT CHANGE UP (ref 27–34)
MCHC RBC-ENTMCNC: 32.9 GM/DL — SIGNIFICANT CHANGE UP (ref 32–36)
MCV RBC AUTO: 84.6 FL — SIGNIFICANT CHANGE UP (ref 80–100)
PLATELET # BLD AUTO: 374 K/UL — SIGNIFICANT CHANGE UP (ref 150–400)
POTASSIUM SERPL-MCNC: 4.3 MMOL/L — SIGNIFICANT CHANGE UP (ref 3.5–5.3)
POTASSIUM SERPL-SCNC: 4.3 MMOL/L — SIGNIFICANT CHANGE UP (ref 3.5–5.3)
PROT SERPL-MCNC: 7.8 G/DL — SIGNIFICANT CHANGE UP (ref 6–8.3)
PROTHROM AB SERPL-ACNC: 13.9 SEC — HIGH (ref 9.5–13)
RBC # BLD: 4.56 M/UL — SIGNIFICANT CHANGE UP (ref 4.2–5.8)
RBC # FLD: 13.2 % — SIGNIFICANT CHANGE UP (ref 10.3–14.5)
SODIUM SERPL-SCNC: 131 MMOL/L — LOW (ref 135–145)
WBC # BLD: 21.93 K/UL — HIGH (ref 3.8–10.5)
WBC # FLD AUTO: 21.93 K/UL — HIGH (ref 3.8–10.5)

## 2024-09-15 PROCEDURE — 73630 X-RAY EXAM OF FOOT: CPT | Mod: 26,LT

## 2024-09-15 PROCEDURE — 99285 EMERGENCY DEPT VISIT HI MDM: CPT

## 2024-09-15 RX ORDER — CLINDAMYCIN PHOSPHATE 150 MG/ML
600 INJECTION, SOLUTION INTRAVENOUS ONCE
Refills: 0 | Status: COMPLETED | OUTPATIENT
Start: 2024-09-15 | End: 2024-09-15

## 2024-09-15 RX ORDER — VANCOMYCIN HCL-SODIUM CHLORIDE IV SOLN 1.5 GM/250ML-0.9% 1.5-0.9/25 GM/ML-%
1000 SOLUTION INTRAVENOUS ONCE
Refills: 0 | Status: COMPLETED | OUTPATIENT
Start: 2024-09-15 | End: 2024-09-15

## 2024-09-15 RX ORDER — PIPERACILLIN SODIUM AND TAZOBACTAM SODIUM 12; 1.5 G/60ML; G/60ML
3.38 INJECTION, POWDER, LYOPHILIZED, FOR SOLUTION INTRAVENOUS ONCE
Refills: 0 | Status: COMPLETED | OUTPATIENT
Start: 2024-09-15 | End: 2024-09-15

## 2024-09-15 RX ADMIN — PIPERACILLIN SODIUM AND TAZOBACTAM SODIUM 200 GRAM(S): 12; 1.5 INJECTION, POWDER, LYOPHILIZED, FOR SOLUTION INTRAVENOUS at 23:42

## 2024-09-15 RX ADMIN — CLINDAMYCIN PHOSPHATE 100 MILLIGRAM(S): 150 INJECTION, SOLUTION INTRAVENOUS at 23:11

## 2024-09-15 NOTE — ED ADULT NURSE NOTE - OBJECTIVE STATEMENT
Patient received in 29, A&Ox3 ambulatory with cane, walker pmh: CVA with residual LLE weakness and numbness, DM presenting to ED c/o left foot injury. Pt recently had foreign object removed from left foot and discharged with antibiotics. Pt now endorsing redness and swelling, yellowing around wound. Denies CP, SOB, n/v/d, fever chills, abdominal pain, visual changes, urinary symptoms. Right 20g placed, labs drawn and sent

## 2024-09-15 NOTE — ED PROVIDER NOTE - OBJECTIVE STATEMENT
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 Patient reports a lesion on the dorsum of his foot that appeared yesterday. It enlarged today and burst with blood drainage. He recently left [facility] on the 12th after having a foreign object removed from his left foot. he states his lesion was not present at that time. He was discharged with augmentin and instructed to follow up in a week. He states he has been compliant with his antibiotic regimen. He denies any fevers, chills, n/v and abdominal pain.

## 2024-09-15 NOTE — ED PROVIDER NOTE - PHYSICAL EXAMINATION
Physical Exam:  Gen:  Well appearing, awake, alert, non-toxic appearing  Head: NCAT  HEENT: Normal conjunctiva, trachea midline, moist mucous membranes  Lung:  no respiratory distress,  speaking in full sentences  CV: RRR, no murmurs, rubs or gallops  Abd: Soft, non-tender, non-distended,   MSK: No visible deformities,   Neuro: No focal motor deficits  Skin: well perfused, + warm left foot.>R w/ 1+ pitting edema. abscess on the dorsum of the left foot.  Psych: appropriate affect and mood

## 2024-09-15 NOTE — ED ADULT NURSE NOTE - CHIEF COMPLAINT QUOTE
pt  with aide coming from home with left lorelei t infected wound, pt treated at [facility] ED for wound 3 days ago, started on antibiotics noted yellowing and swelling with redness around wound. increased pain. past med hx cva, htn, hld, dm2 fs= 168

## 2024-09-15 NOTE — ED PROVIDER NOTE - ATTENDING CONTRIBUTION TO CARE
agree with above hpi  on my exam  GEN - NAD; well appearing; A+O x3   HEAD - NC/AT   EYES- PERRL, EOMI  ENT: Airway patent, mmm, Oral cavity and pharynx normal. No inflammation, swelling, exudate, or lesions.  NECK: Neck supple  PULMONARY - CTA b/l, symmetric breath sounds.   CARDIAC -s1s2, RRR, no M,G,R  ABDOMEN - +BS, ND, NT, soft, no guarding, no rebound, no masses   BACK - no CVA tenderness, Normal  spine   EXTREMITIES - distal left foot swollen/tender/portions macerated, anterior blistering present, no crepitus, no necrosis, extremity perfused, silt.   SKIN - no rash or bruising   NEUROLOGIC - alert, speech clear, no focal deficits  Plan for labs, xr foot, cultures, podiatry c/s, likely abx pending initial eval. Podiatry consulted, patient agreeable to plan.

## 2024-09-15 NOTE — ED PROVIDER NOTE - WR ORDER DATE AND TIME 1
Normal vision: sees adequately in most situations; can see medication labels, newsprint 15-Sep-2024 20:39

## 2024-09-15 NOTE — ED PROVIDER NOTE - PROGRESS NOTE DETAILS
Sangeetha Lazo DO, (PGY2): podiatry consulted Patient is to be admitted to the hospital and the case was discussed with the admitting physician.  Any changes in plan, additional imaging/labs, and further work up will be at the discretion of the admitting physician.

## 2024-09-15 NOTE — ED ADULT TRIAGE NOTE - CHIEF COMPLAINT QUOTE
pt  with aide coming from home with left lorelei t infected wound, pt treated at [facility] ED for wound 3 days ago, started on antibiotics noted yellowing and swelling with redness around wound. increased pain. past med hx cva, htn, hld, dm2 fs= 168

## 2024-09-15 NOTE — ED PROVIDER NOTE - CLINICAL SUMMARY MEDICAL DECISION MAKING FREE TEXT BOX
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 Patient reports a lesion on the dorsum of his foot that appeared yesterday. It enlarged today and burst with blood drainage. He recently left [facility] on the 12th after having a foreign object removed from his left foot. he states his lesion was not present at that time. He was discharged with augmentin and instructed to follow up in a week. He states he has been compliant with his antibiotic regimen. He denies any fevers, chills, n/v and abdominal pain.  + warm left foot.>R w/ 1+ pitting edema. abscess on the dorsum of the left foot.  concern for oseo vs abscess vs cellulitis. will eval with foot xray, labs including cbc and crp, consult podiatry and re-assess.

## 2024-09-15 NOTE — ED ADULT NURSE NOTE - NSFALLHARMRISKINTERV_ED_ALL_ED

## 2024-09-16 DIAGNOSIS — Z86.73 PERSONAL HISTORY OF TRANSIENT ISCHEMIC ATTACK (TIA), AND CEREBRAL INFARCTION WITHOUT RESIDUAL DEFICITS: ICD-10-CM

## 2024-09-16 DIAGNOSIS — R74.01 ELEVATION OF LEVELS OF LIVER TRANSAMINASE LEVELS: ICD-10-CM

## 2024-09-16 DIAGNOSIS — E87.1 HYPO-OSMOLALITY AND HYPONATREMIA: ICD-10-CM

## 2024-09-16 DIAGNOSIS — S91.332A PUNCTURE WOUND WITHOUT FOREIGN BODY, LEFT FOOT, INITIAL ENCOUNTER: ICD-10-CM

## 2024-09-16 DIAGNOSIS — M79.672 PAIN IN LEFT FOOT: ICD-10-CM

## 2024-09-16 DIAGNOSIS — L03.116 CELLULITIS OF LEFT LOWER LIMB: ICD-10-CM

## 2024-09-16 DIAGNOSIS — D64.9 ANEMIA, UNSPECIFIED: ICD-10-CM

## 2024-09-16 DIAGNOSIS — E11.9 TYPE 2 DIABETES MELLITUS WITHOUT COMPLICATIONS: ICD-10-CM

## 2024-09-16 DIAGNOSIS — A41.9 SEPSIS, UNSPECIFIED ORGANISM: ICD-10-CM

## 2024-09-16 DIAGNOSIS — Z29.9 ENCOUNTER FOR PROPHYLACTIC MEASURES, UNSPECIFIED: ICD-10-CM

## 2024-09-16 PROBLEM — I63.9 CEREBRAL INFARCTION, UNSPECIFIED: Chronic | Status: ACTIVE | Noted: 2024-09-12

## 2024-09-16 LAB
A1C WITH ESTIMATED AVERAGE GLUCOSE RESULT: 8 % — HIGH (ref 4–5.6)
ADD ON TEST-SPECIMEN IN LAB: SIGNIFICANT CHANGE UP
ALBUMIN SERPL ELPH-MCNC: 3.4 G/DL — SIGNIFICANT CHANGE UP (ref 3.3–5)
ALP SERPL-CCNC: 64 U/L — SIGNIFICANT CHANGE UP (ref 40–120)
ALT FLD-CCNC: 58 U/L — HIGH (ref 4–41)
ANION GAP SERPL CALC-SCNC: 11 MMOL/L — SIGNIFICANT CHANGE UP (ref 7–14)
ANISOCYTOSIS BLD QL: SLIGHT — SIGNIFICANT CHANGE UP
AST SERPL-CCNC: 26 U/L — SIGNIFICANT CHANGE UP (ref 4–40)
BASOPHILS # BLD AUTO: 0 K/UL — SIGNIFICANT CHANGE UP (ref 0–0.2)
BASOPHILS # BLD AUTO: 0.18 K/UL — SIGNIFICANT CHANGE UP (ref 0–0.2)
BASOPHILS NFR BLD AUTO: 0 % — SIGNIFICANT CHANGE UP (ref 0–2)
BASOPHILS NFR BLD AUTO: 0.9 % — SIGNIFICANT CHANGE UP (ref 0–2)
BILIRUB SERPL-MCNC: 0.6 MG/DL — SIGNIFICANT CHANGE UP (ref 0.2–1.2)
BUN SERPL-MCNC: 10 MG/DL — SIGNIFICANT CHANGE UP (ref 7–23)
CALCIUM SERPL-MCNC: 9.3 MG/DL — SIGNIFICANT CHANGE UP (ref 8.4–10.5)
CHLORIDE SERPL-SCNC: 97 MMOL/L — LOW (ref 98–107)
CO2 SERPL-SCNC: 23 MMOL/L — SIGNIFICANT CHANGE UP (ref 22–31)
CREAT SERPL-MCNC: 0.88 MG/DL — SIGNIFICANT CHANGE UP (ref 0.5–1.3)
CRP SERPL-MCNC: 179.8 MG/L — HIGH
EGFR: 98 ML/MIN/1.73M2 — SIGNIFICANT CHANGE UP
EOSINOPHIL # BLD AUTO: 0 K/UL — SIGNIFICANT CHANGE UP (ref 0–0.5)
EOSINOPHIL # BLD AUTO: 0 K/UL — SIGNIFICANT CHANGE UP (ref 0–0.5)
EOSINOPHIL NFR BLD AUTO: 0 % — SIGNIFICANT CHANGE UP (ref 0–6)
EOSINOPHIL NFR BLD AUTO: 0 % — SIGNIFICANT CHANGE UP (ref 0–6)
ERYTHROCYTE [SEDIMENTATION RATE] IN BLOOD: 93 MM/HR — HIGH (ref 1–15)
ERYTHROCYTE [SEDIMENTATION RATE] IN BLOOD: SIGNIFICANT CHANGE UP MM/HR (ref 1–15)
ESTIMATED AVERAGE GLUCOSE: 183 — SIGNIFICANT CHANGE UP
FERRITIN SERPL-MCNC: 396 NG/ML — SIGNIFICANT CHANGE UP (ref 30–400)
GIANT PLATELETS BLD QL SMEAR: PRESENT — SIGNIFICANT CHANGE UP
GIANT PLATELETS BLD QL SMEAR: PRESENT — SIGNIFICANT CHANGE UP
GLUCOSE BLDC GLUCOMTR-MCNC: 208 MG/DL — HIGH (ref 70–99)
GLUCOSE BLDC GLUCOMTR-MCNC: 232 MG/DL — HIGH (ref 70–99)
GLUCOSE BLDC GLUCOMTR-MCNC: 264 MG/DL — HIGH (ref 70–99)
GLUCOSE BLDC GLUCOMTR-MCNC: 270 MG/DL — HIGH (ref 70–99)
GLUCOSE SERPL-MCNC: 318 MG/DL — HIGH (ref 70–99)
GRAM STN FLD: ABNORMAL
HCT VFR BLD CALC: 36.9 % — LOW (ref 39–50)
HGB BLD-MCNC: 12.1 G/DL — LOW (ref 13–17)
HYPOCHROMIA BLD QL: SLIGHT — SIGNIFICANT CHANGE UP
IANC: 16.18 K/UL — HIGH (ref 1.8–7.4)
IRON SATN MFR SERPL: 12 % — LOW (ref 14–50)
IRON SATN MFR SERPL: 17 UG/DL — LOW (ref 45–165)
LYMPHOCYTES # BLD AUTO: 10.4 % — LOW (ref 13–44)
LYMPHOCYTES # BLD AUTO: 2.02 K/UL — SIGNIFICANT CHANGE UP (ref 1–3.3)
LYMPHOCYTES # BLD AUTO: 2.11 K/UL — SIGNIFICANT CHANGE UP (ref 1–3.3)
LYMPHOCYTES # BLD AUTO: 9.2 % — LOW (ref 13–44)
MAGNESIUM SERPL-MCNC: 2.5 MG/DL — SIGNIFICANT CHANGE UP (ref 1.6–2.6)
MANUAL SMEAR VERIFICATION: SIGNIFICANT CHANGE UP
MANUAL SMEAR VERIFICATION: SIGNIFICANT CHANGE UP
MCHC RBC-ENTMCNC: 28.1 PG — SIGNIFICANT CHANGE UP (ref 27–34)
MCHC RBC-ENTMCNC: 32.8 GM/DL — SIGNIFICANT CHANGE UP (ref 32–36)
MCV RBC AUTO: 85.6 FL — SIGNIFICANT CHANGE UP (ref 80–100)
MICROCYTES BLD QL: SIGNIFICANT CHANGE UP
MICROCYTES BLD QL: SLIGHT — SIGNIFICANT CHANGE UP
MONOCYTES # BLD AUTO: 2.11 K/UL — HIGH (ref 0–0.9)
MONOCYTES # BLD AUTO: 2.21 K/UL — HIGH (ref 0–0.9)
MONOCYTES NFR BLD AUTO: 10.1 % — SIGNIFICANT CHANGE UP (ref 2–14)
MONOCYTES NFR BLD AUTO: 10.4 % — SIGNIFICANT CHANGE UP (ref 2–14)
NEUTROPHILS # BLD AUTO: 15.37 K/UL — HIGH (ref 1.8–7.4)
NEUTROPHILS # BLD AUTO: 17.7 K/UL — HIGH (ref 1.8–7.4)
NEUTROPHILS NFR BLD AUTO: 73.1 % — SIGNIFICANT CHANGE UP (ref 43–77)
NEUTROPHILS NFR BLD AUTO: 79.8 % — HIGH (ref 43–77)
NEUTS BAND # BLD: 0.9 % — SIGNIFICANT CHANGE UP (ref 0–6)
NEUTS BAND # BLD: 2.6 % — SIGNIFICANT CHANGE UP (ref 0–6)
NRBC # BLD: 4 /100 WBCS — HIGH (ref 0–0)
PHOSPHATE SERPL-MCNC: 2.8 MG/DL — SIGNIFICANT CHANGE UP (ref 2.5–4.5)
PLAT MORPH BLD: NORMAL — SIGNIFICANT CHANGE UP
PLAT MORPH BLD: NORMAL — SIGNIFICANT CHANGE UP
PLATELET # BLD AUTO: 374 K/UL — SIGNIFICANT CHANGE UP (ref 150–400)
PLATELET COUNT - ESTIMATE: NORMAL — SIGNIFICANT CHANGE UP
PLATELET COUNT - ESTIMATE: NORMAL — SIGNIFICANT CHANGE UP
POIKILOCYTOSIS BLD QL AUTO: SLIGHT — SIGNIFICANT CHANGE UP
POTASSIUM SERPL-MCNC: 4.6 MMOL/L — SIGNIFICANT CHANGE UP (ref 3.5–5.3)
POTASSIUM SERPL-SCNC: 4.6 MMOL/L — SIGNIFICANT CHANGE UP (ref 3.5–5.3)
PROT SERPL-MCNC: 7.4 G/DL — SIGNIFICANT CHANGE UP (ref 6–8.3)
RBC # BLD: 4.31 M/UL — SIGNIFICANT CHANGE UP (ref 4.2–5.8)
RBC # FLD: 13 % — SIGNIFICANT CHANGE UP (ref 10.3–14.5)
RBC BLD AUTO: NORMAL — SIGNIFICANT CHANGE UP
RBC BLD AUTO: NORMAL — SIGNIFICANT CHANGE UP
SODIUM SERPL-SCNC: 131 MMOL/L — LOW (ref 135–145)
SPECIMEN SOURCE: SIGNIFICANT CHANGE UP
TARGETS BLD QL SMEAR: SLIGHT — SIGNIFICANT CHANGE UP
TIBC SERPL-MCNC: 138 UG/DL — LOW (ref 220–430)
UIBC SERPL-MCNC: 121 UG/DL — SIGNIFICANT CHANGE UP (ref 110–370)
VARIANT LYMPHS # BLD: 2.6 % — SIGNIFICANT CHANGE UP (ref 0–6)
VIT B12 SERPL-MCNC: 406 PG/ML — SIGNIFICANT CHANGE UP (ref 200–900)
WBC # BLD: 20.3 K/UL — HIGH (ref 3.8–10.5)
WBC # FLD AUTO: 20.3 K/UL — HIGH (ref 3.8–10.5)

## 2024-09-16 PROCEDURE — 99223 1ST HOSP IP/OBS HIGH 75: CPT

## 2024-09-16 PROCEDURE — 99254 IP/OBS CNSLTJ NEW/EST MOD 60: CPT | Mod: GC

## 2024-09-16 RX ORDER — INSULIN LISPRO 100/ML
VIAL (ML) SUBCUTANEOUS
Refills: 0 | Status: DISCONTINUED | OUTPATIENT
Start: 2024-09-16 | End: 2024-09-27

## 2024-09-16 RX ORDER — ASPIRIN 325 MG
81 TABLET ORAL DAILY
Refills: 0 | Status: DISCONTINUED | OUTPATIENT
Start: 2024-09-16 | End: 2024-09-27

## 2024-09-16 RX ORDER — HUMAN INSULIN 100 [USP'U]/ML
30 INJECTION, SUSPENSION SUBCUTANEOUS
Refills: 0 | DISCHARGE

## 2024-09-16 RX ORDER — TRAMADOL HYDROCHLORIDE 50 MG/1
50 TABLET, COATED ORAL EVERY 6 HOURS
Refills: 0 | Status: DISCONTINUED | OUTPATIENT
Start: 2024-09-16 | End: 2024-09-23

## 2024-09-16 RX ORDER — ACETAMINOPHEN 325 MG
1000 TABLET ORAL ONCE
Refills: 0 | Status: COMPLETED | OUTPATIENT
Start: 2024-09-16 | End: 2024-09-16

## 2024-09-16 RX ORDER — CLINDAMYCIN PHOSPHATE 150 MG/ML
INJECTION, SOLUTION INTRAVENOUS
Refills: 0 | Status: DISCONTINUED | OUTPATIENT
Start: 2024-09-16 | End: 2024-09-16

## 2024-09-16 RX ORDER — CLINDAMYCIN PHOSPHATE 150 MG/ML
900 INJECTION, SOLUTION INTRAVENOUS EVERY 8 HOURS
Refills: 0 | Status: DISCONTINUED | OUTPATIENT
Start: 2024-09-16 | End: 2024-09-16

## 2024-09-16 RX ORDER — GLUCAGON INJECTION, SOLUTION 0.5 MG/.1ML
1 INJECTION, SOLUTION SUBCUTANEOUS ONCE
Refills: 0 | Status: DISCONTINUED | OUTPATIENT
Start: 2024-09-16 | End: 2024-09-27

## 2024-09-16 RX ORDER — ALCOHOL ANTISEPTIC PADS
25 PADS, MEDICATED (EA) TOPICAL ONCE
Refills: 0 | Status: DISCONTINUED | OUTPATIENT
Start: 2024-09-16 | End: 2024-09-27

## 2024-09-16 RX ORDER — INFLUENZA VIRUS VACCINE 15; 15; 15; 15 UG/.5ML; UG/.5ML; UG/.5ML; UG/.5ML
0.5 SUSPENSION INTRAMUSCULAR ONCE
Refills: 0 | Status: DISCONTINUED | OUTPATIENT
Start: 2024-09-16 | End: 2024-09-27

## 2024-09-16 RX ORDER — PIPERACILLIN SODIUM AND TAZOBACTAM SODIUM 12; 1.5 G/60ML; G/60ML
3.38 INJECTION, POWDER, LYOPHILIZED, FOR SOLUTION INTRAVENOUS EVERY 8 HOURS
Refills: 0 | Status: COMPLETED | OUTPATIENT
Start: 2024-09-16 | End: 2024-09-23

## 2024-09-16 RX ORDER — SODIUM HYPOCHLORITE 0.057 %
1 GEL (GRAM) TOPICAL DAILY
Refills: 0 | Status: DISCONTINUED | OUTPATIENT
Start: 2024-09-16 | End: 2024-09-26

## 2024-09-16 RX ORDER — SODIUM CHLORIDE IRRIG SOLUTION 0.9 %
1000 SOLUTION, IRRIGATION IRRIGATION
Refills: 0 | Status: DISCONTINUED | OUTPATIENT
Start: 2024-09-16 | End: 2024-09-27

## 2024-09-16 RX ORDER — INSULIN GLARGINE 300 U/ML
25 INJECTION, SOLUTION SUBCUTANEOUS EVERY MORNING
Refills: 0 | Status: DISCONTINUED | OUTPATIENT
Start: 2024-09-17 | End: 2024-09-27

## 2024-09-16 RX ORDER — ALCOHOL ANTISEPTIC PADS
15 PADS, MEDICATED (EA) TOPICAL ONCE
Refills: 0 | Status: DISCONTINUED | OUTPATIENT
Start: 2024-09-16 | End: 2024-09-27

## 2024-09-16 RX ORDER — INSULIN LISPRO 100/ML
3 VIAL (ML) SUBCUTANEOUS
Refills: 0 | Status: DISCONTINUED | OUTPATIENT
Start: 2024-09-16 | End: 2024-09-27

## 2024-09-16 RX ORDER — INSULIN GLARGINE 300 U/ML
15 INJECTION, SOLUTION SUBCUTANEOUS ONCE
Refills: 0 | Status: COMPLETED | OUTPATIENT
Start: 2024-09-16 | End: 2024-09-16

## 2024-09-16 RX ORDER — ALCOHOL ANTISEPTIC PADS
12.5 PADS, MEDICATED (EA) TOPICAL ONCE
Refills: 0 | Status: DISCONTINUED | OUTPATIENT
Start: 2024-09-16 | End: 2024-09-27

## 2024-09-16 RX ORDER — CLINDAMYCIN PHOSPHATE 150 MG/ML
900 INJECTION, SOLUTION INTRAVENOUS ONCE
Refills: 0 | Status: COMPLETED | OUTPATIENT
Start: 2024-09-16 | End: 2024-09-16

## 2024-09-16 RX ORDER — INSULIN LISPRO 100/ML
VIAL (ML) SUBCUTANEOUS AT BEDTIME
Refills: 0 | Status: DISCONTINUED | OUTPATIENT
Start: 2024-09-16 | End: 2024-09-27

## 2024-09-16 RX ORDER — VANCOMYCIN HCL-SODIUM CHLORIDE IV SOLN 1.5 GM/250ML-0.9% 1.5-0.9/25 GM/ML-%
1250 SOLUTION INTRAVENOUS EVERY 12 HOURS
Refills: 0 | Status: DISCONTINUED | OUTPATIENT
Start: 2024-09-16 | End: 2024-09-16

## 2024-09-16 RX ORDER — HUMAN INSULIN 100 [USP'U]/ML
15 INJECTION, SUSPENSION SUBCUTANEOUS
Refills: 0 | DISCHARGE

## 2024-09-16 RX ORDER — ENOXAPARIN SODIUM 150 MG/ML
40 INJECTION SUBCUTANEOUS EVERY 24 HOURS
Refills: 0 | Status: DISCONTINUED | OUTPATIENT
Start: 2024-09-16 | End: 2024-09-26

## 2024-09-16 RX ORDER — ATORVASTATIN CALCIUM 10 MG/1
80 TABLET, FILM COATED ORAL AT BEDTIME
Refills: 0 | Status: DISCONTINUED | OUTPATIENT
Start: 2024-09-16 | End: 2024-09-27

## 2024-09-16 RX ORDER — ACETAMINOPHEN 325 MG
650 TABLET ORAL EVERY 6 HOURS
Refills: 0 | Status: DISCONTINUED | OUTPATIENT
Start: 2024-09-16 | End: 2024-09-27

## 2024-09-16 RX ORDER — TRAMADOL HYDROCHLORIDE 50 MG/1
25 TABLET, COATED ORAL EVERY 6 HOURS
Refills: 0 | Status: DISCONTINUED | OUTPATIENT
Start: 2024-09-16 | End: 2024-09-16

## 2024-09-16 RX ADMIN — PIPERACILLIN SODIUM AND TAZOBACTAM SODIUM 25 GRAM(S): 12; 1.5 INJECTION, POWDER, LYOPHILIZED, FOR SOLUTION INTRAVENOUS at 23:01

## 2024-09-16 RX ADMIN — Medication 81 MILLIGRAM(S): at 13:17

## 2024-09-16 RX ADMIN — CLINDAMYCIN PHOSPHATE 100 MILLIGRAM(S): 150 INJECTION, SOLUTION INTRAVENOUS at 14:59

## 2024-09-16 RX ADMIN — CLINDAMYCIN PHOSPHATE 100 MILLIGRAM(S): 150 INJECTION, SOLUTION INTRAVENOUS at 06:41

## 2024-09-16 RX ADMIN — TRAMADOL HYDROCHLORIDE 50 MILLIGRAM(S): 50 TABLET, COATED ORAL at 13:17

## 2024-09-16 RX ADMIN — Medication 3 UNIT(S): at 17:58

## 2024-09-16 RX ADMIN — ATORVASTATIN CALCIUM 80 MILLIGRAM(S): 10 TABLET, FILM COATED ORAL at 23:01

## 2024-09-16 RX ADMIN — Medication 650 MILLIGRAM(S): at 19:00

## 2024-09-16 RX ADMIN — PIPERACILLIN SODIUM AND TAZOBACTAM SODIUM 25 GRAM(S): 12; 1.5 INJECTION, POWDER, LYOPHILIZED, FOR SOLUTION INTRAVENOUS at 06:29

## 2024-09-16 RX ADMIN — Medication 3: at 12:17

## 2024-09-16 RX ADMIN — Medication 650 MILLIGRAM(S): at 18:00

## 2024-09-16 RX ADMIN — VANCOMYCIN HCL-SODIUM CHLORIDE IV SOLN 1.5 GM/250ML-0.9% 166.67 MILLIGRAM(S): 1.5-0.9/25 SOLUTION at 13:09

## 2024-09-16 RX ADMIN — PIPERACILLIN SODIUM AND TAZOBACTAM SODIUM 25 GRAM(S): 12; 1.5 INJECTION, POWDER, LYOPHILIZED, FOR SOLUTION INTRAVENOUS at 15:40

## 2024-09-16 RX ADMIN — Medication 3: at 17:58

## 2024-09-16 RX ADMIN — VANCOMYCIN HCL-SODIUM CHLORIDE IV SOLN 1.5 GM/250ML-0.9% 250 MILLIGRAM(S): 1.5-0.9/25 SOLUTION at 01:12

## 2024-09-16 RX ADMIN — TRAMADOL HYDROCHLORIDE 50 MILLIGRAM(S): 50 TABLET, COATED ORAL at 14:20

## 2024-09-16 RX ADMIN — ENOXAPARIN SODIUM 40 MILLIGRAM(S): 150 INJECTION SUBCUTANEOUS at 13:17

## 2024-09-16 RX ADMIN — INSULIN GLARGINE 15 UNIT(S): 300 INJECTION, SOLUTION SUBCUTANEOUS at 11:07

## 2024-09-16 RX ADMIN — Medication 400 MILLIGRAM(S): at 03:01

## 2024-09-16 NOTE — PATIENT PROFILE ADULT - FUNCTIONAL ASSESSMENT - DAILY ACTIVITY 4.
Consent: The patient's consent was obtained including but not limited to risks of crusting, scabbing, blistering, scarring, darker or lighter pigmentary change, recurrence, incomplete removal and infection. Show Topical Anesthesia Variable?: Yes Detail Level: Detailed Post-Care Instructions: I reviewed with the patient in detail post-care instructions. Patient is to wear sunprotection, and avoid picking at any of the treated lesions. Pt may apply Vaseline to crusted or scabbing areas. Include Z78.9 (Other Specified Conditions Influencing Health Status) As An Associated Diagnosis?: No 4 = No assist / stand by assistance Spray Paint Text: The liquid nitrogen was applied to the skin utilizing a spray paint frosting technique. Medical Necessity Clause: This procedure was medically necessary because the lesions that were treated were: Medical Necessity Information: It is in your best interest to select a reason for this procedure from the list below. All of these items fulfill various CMS LCD requirements except the new and changing color options.

## 2024-09-16 NOTE — H&P ADULT - PROBLEM SELECTOR PLAN 3
Pt with left foot and lower leg erythema and edema, also induration. Concerning for cellulitis.  - Plan as above for infected left foot wound

## 2024-09-16 NOTE — H&P ADULT - NSICDXPASTMEDICALHX_GEN_ALL_CORE_FT
PAST MEDICAL HISTORY:  CVA (cerebrovascular accident)     Type 2 diabetes mellitus treated with insulin

## 2024-09-16 NOTE — PROGRESS NOTE ADULT - ASSESSMENT
59 M presents for left foot wound  - Pt was seen and evaluated  - Afebrile, WBC 20.30  - 9/15 s/p Left foot incision and drainage: Left foot plantar 1st interspace wound to subQ, blister with serous drainage and fluctuance of the dorsal 1st interspace, erythema of the foot globally, strong malodor, right foot no signs of infection  - Left foot X-ray: 1st interspace soft tissue emphysema, no OM   - Left foot wound culture pending  - ID recs appreciated   - Possible veraflow vac application on Wed 9/18 pending appearance   - Pod plan local wound care pending appearance   - Please document medial clearance for possible podiatric surgery under anesthesia  - Seen with attending    59M s/p bedside Left foot incision and drainage (DOS 9/15)   - Pt was seen and evaluated  - Afebrile, WBC 20.30  - 9/15 s/p bedside Left foot incision and drainage: Left foot plantar 1st interspace wound to subQ, blister with serous drainage and fluctuance of the dorsal 1st interspace, erythema of the foot globally, strong malodor, right foot no signs of infection  - Left foot X-ray: 1st interspace soft tissue emphysema, no OM   - Left foot wound culture pending  - Ordered dakins   - ID recs appreciated   - Possible veraflow vac application on Wed 9/18 pending appearance   - Pod plan local wound care pending appearance   - Please document medial clearance for possible podiatric surgery under anesthesia  - Seen with attending

## 2024-09-16 NOTE — H&P ADULT - ASSESSMENT
60 yo man with history of type 2 DM (on insulin) and CVA (~2 years ago) with residual left-sided weakness and numbness presents with increasing redness and swelling of his left foot and leg after pt sustained a left foot foreign body puncture wound, admitted with sepsis likely 2/2 infected left foot wound with superimposed cellulitis.

## 2024-09-16 NOTE — H&P ADULT - PROBLEM SELECTOR PLAN 5
Mild, with AST 41 and ALT 65. Unclear etiology. Possibly in setting of sepsis. Pt wit no abdominal pain or tenderness on exam.   - Trend LFTs for now

## 2024-09-16 NOTE — CONSULT NOTE ADULT - SUBJECTIVE AND OBJECTIVE BOX
Patient is a 59y old  Male who presents with a chief complaint of Left foot wound    HPI:  59M PMHx T2DM on insulin, CVA with residual LLE weakness and numbness presents to the ed for left foot injury. Patient reports a lesion on the dorsum of his foot that appeared yesterday. It enlarged today and burst with blood drainage. He was previously seen in the ED on 9/12/24 after CT scan found foreign body in the first web space with surrounding soft tissue gas and ovoid soft tissue density measuring 2.1 cm with in the mid to distal 3rd left femur with WBC of 27.73. Podiatry was consulted at that time who performed I&D and removed the foreign body, after which patient left [facility] and was sent home on amoxicillin/clavulanate which he has been taking. Upon return to the ED patient afebrile but tachycardic. Labs showing WBC 21.93, ESR 93, .5, a1c 8.0 and repeat XR shows no foreign body but residual small amount of localized mottled air collections without tracking, bone and fragmentation of the hallux distal phalanx tuft. Again podiatry was consulted who performed I&D which expressed purulence and cultures were taken. ID consulted.     REVIEW OF SYSTEMS  pending full examination    prior hospital charts reviewed [V]  primary team notes reviewed [V]  other consultant notes reviewed [V]    PAST MEDICAL & SURGICAL HISTORY:  Diabetes      CVA (cerebrovascular accident)      No significant past surgical history          SOCIAL HISTORY:  Denied smoking/vaping/alcohol/recreational drug use    FAMILY HISTORY:  No pertinent family history in first degree relatives        Allergies  No Known Allergies        ANTIMICROBIALS:  clindamycin IVPB 900 every 8 hours  clindamycin IVPB    piperacillin/tazobactam IVPB.. 3.375 every 8 hours  vancomycin  IVPB 1250 every 12 hours      ANTIMICROBIALS (past 90 days):  MEDICATIONS  (STANDING):    clindamycin IVPB   100 mL/Hr IV Intermittent (09-15-24 @ 23:11)    clindamycin IVPB   100 mL/Hr IV Intermittent (09-16-24 @ 06:41)    piperacillin/tazobactam IVPB..   25 mL/Hr IV Intermittent (09-16-24 @ 06:29)    piperacillin/tazobactam IVPB...   200 mL/Hr IV Intermittent (09-15-24 @ 23:42)    vancomycin  IVPB.   250 mL/Hr IV Intermittent (09-16-24 @ 01:12)        OTHER MEDS:   MEDICATIONS  (STANDING):      VITALS:  Vital Signs Last 24 Hrs  T(F): 98.2 (09-16-24 @ 08:42), Max: 102.3 (09-11-24 @ 18:27)    Vital Signs Last 24 Hrs  HR: 81 (09-16-24 @ 08:42) (80 - 104)  BP: 140/62 (09-16-24 @ 08:42) (118/59 - 154/77)  RR: 18 (09-16-24 @ 08:42)  SpO2: 100% (09-16-24 @ 08:42) (98% - 100%)  Wt(kg): --    EXAM:  pending full examination      Labs:                        12.7   21.93 )-----------( 374      ( 15 Sep 2024 21:15 )             38.6     09-15    131<L>  |  97<L>  |  14  ----------------------------<  198<H>  4.3   |  22  |  0.95    Ca    9.4      15 Sep 2024 21:15    TPro  7.8  /  Alb  3.6  /  TBili  0.4  /  DBili  x   /  AST  41<H>  /  ALT  65<H>  /  AlkPhos  68  09-15      WBC Trend:  WBC Count: 21.93 (09-15-24 @ 21:15)  WBC Count: 27.73 (09-12-24 @ 05:45)  WBC Count: 25.47 (09-11-24 @ 23:25)      Auto Neutrophil #: 17.70 K/uL (09-15-24 @ 21:15)  Band Neutrophils %: 0.9 % (09-15-24 @ 21:15)  Auto Neutrophil #: 21.70 K/uL (09-11-24 @ 23:25)  Band Neutrophils %: 1.7 % (09-11-24 @ 23:25)      Creatine Trend:  Creatinine: 0.95 (09-15)  Creatinine: 0.99 (09-12)  Creatinine: 1.10 (09-11)      Liver Biochemical Testing Trend:  Alanine Aminotransferase (ALT/SGPT): 65 *H* (09-15)  Alanine Aminotransferase (ALT/SGPT): 34 (09-11)  Aspartate Aminotransferase (AST/SGOT): 41 (09-15-24 @ 21:15)  Aspartate Aminotransferase (AST/SGOT): 34 (09-11-24 @ 23:25)  Bilirubin Total: 0.4 (09-15)  Bilirubin Total: 0.6 (09-11)    Auto Eosinophil %: 0.0 % (09-15-24 @ 21:15)    MICROBIOLOGY:  Culture - Blood (collected 11 Sep 2024 23:43)  Source: .Blood Blood-Peripheral  Preliminary Report:    No growth at 4 days    Culture - Blood (collected 11 Sep 2024 23:43)  Source: .Blood Blood-Peripheral  Preliminary Report:    No growth at 4 days    Urinalysis with Rflx Culture (collected 11 Sep 2024 23:00)    Procalcitonin: 0.30 (09-11)    C-Reactive Protein: 185.5 (09-15)  C-Reactive Protein: 186.7 (09-11)    A1C with Estimated Average Glucose Result: 8.0 % (09-16-24 @ 02:58)    RADIOLOGY:  < from: Xray Foot AP + Lateral + Oblique, Left (09.15.24 @ 20:56) >  IMPRESSION:  Previously seen thin linear metallic foreign body in the 1st webspace   toes tissues is no longer identified. Residual small amount of localized   mottled air collections in 1st websoft tissues without appreciated   additional tracking. No definite radiographic evidence for osteomyelitis   however if concern remains MRI suggested to further assess.    Suggestion of bone fragmentation of hallux distal phalanx tuft. Intact   aligned remaining imaged osteoarticular structures.    Tarsometatarsal alignment maintained without evidence for a Lisfranc   injury.  Preserved joint spaces and no joint margin erosions.    Posterior calcaneal enthesophyte.    No discrete lytic or blastic lesions.    < from: CT Lower Extremity w/ IV Cont, Left (09.12.24 @ 01:18) >  IMPRESSION:  1.  Linear metallic foreign body of the plantar 1st webspacewith   surrounding soft tissue gas as noted on subsequent x-ray. This may be   related to open wound or gangrene.  2.  No discrete fluid collection is seen.  3.  Borderline left pelvic/groin adenopathy.  4.  Ovoid soft tissue density measuring 2.1 cmwithin the mid to distal   3rd of the left femur. This could reflect focus of red marrow, however,   if there is a history of cancer or concern for metastatic disease   consider follow-up MRI of the left femur for further evaluation.  5.  Preliminaryreport was provided by [facility]. This represents a change in   interpretation from the preliminary report.    < from: Xray Chest 2 Views PA/Lat (09.11.24 @ 23:23) >  IMPRESSION:    No focal consolidation.       Patient is a 59y old  Male who presents with a chief complaint of Left foot wound    HPI:  59M PMHx T2DM on insulin, CVA with residual LLE weakness and numbness presents to the ed for left foot injury. Patient reports a lesion on the dorsum of his foot that appeared yesterday. It enlarged today and burst with blood drainage. He was previously seen in the ED on 9/12/24 after CT scan found foreign body in the first web space with surrounding soft tissue gas and ovoid soft tissue density measuring 2.1 cm with in the mid to distal 3rd left femur with WBC of 27.73. Podiatry was consulted at that time who performed I&D and removed the foreign body, after which patient left [facility] and was sent home on amoxicillin/clavulanate which he has been taking. Upon return to the ED patient afebrile but tachycardic. Labs showing WBC 21.93, ESR 93, .5, a1c 8.0 and repeat XR shows no foreign body but residual small amount of localized mottled air collections without tracking, bone and fragmentation of the hallux distal phalanx tuft. Again podiatry was consulted who performed I&D which expressed purulence and cultures were taken. ID consulted. Currently patient states that his pain is much improved since he has come to the hospital. He never had fevers at home just pain and noticed that the toe was swollen and draining.     REVIEW OF SYSTEMS  Constitutional: No fevers, No chills, No weight loss, No fatigue   Skin: No rash, no phlebitis	  Eyes: No discharge, No change in vision	  ENMT: No sore throat, No ulcers  Respiratory: No cough, no SOB  Cardiovascular:  No chest pain, No palpitations   Gastrointestinal: No pain, No nausea, No vomiting, No diarrhea, No constipation	  Genitourinary: No dysuria, No frequency, No hesitancy, No flank pain  MSK: positive LLE pain, No back pain, No edema  Neurological: No HA, no weakness, no seizures, no AMS     prior hospital charts reviewed [V]  primary team notes reviewed [V]  other consultant notes reviewed [V]    PAST MEDICAL & SURGICAL HISTORY:  Diabetes      CVA (cerebrovascular accident)      No significant past surgical history          SOCIAL HISTORY:  Born in [facility] came to the  in 1993, stopped drinking approx 2000, stray cats outside his house without bites or scratches, last travel 2019 to [place], no sick contacts     FAMILY HISTORY:  No pertinent family history in first degree relatives        Allergies  No Known Allergies        ANTIMICROBIALS:  clindamycin IVPB 900 every 8 hours  clindamycin IVPB    piperacillin/tazobactam IVPB.. 3.375 every 8 hours  vancomycin  IVPB 1250 every 12 hours      ANTIMICROBIALS (past 90 days):  MEDICATIONS  (STANDING):    clindamycin IVPB   100 mL/Hr IV Intermittent (09-15-24 @ 23:11)    clindamycin IVPB   100 mL/Hr IV Intermittent (09-16-24 @ 06:41)    piperacillin/tazobactam IVPB..   25 mL/Hr IV Intermittent (09-16-24 @ 06:29)    piperacillin/tazobactam IVPB...   200 mL/Hr IV Intermittent (09-15-24 @ 23:42)    vancomycin  IVPB.   250 mL/Hr IV Intermittent (09-16-24 @ 01:12)        OTHER MEDS:   MEDICATIONS  (STANDING):      VITALS:  Vital Signs Last 24 Hrs  T(F): 98.2 (09-16-24 @ 08:42), Max: 102.3 (09-11-24 @ 18:27)    Vital Signs Last 24 Hrs  HR: 81 (09-16-24 @ 08:42) (80 - 104)  BP: 140/62 (09-16-24 @ 08:42) (118/59 - 154/77)  RR: 18 (09-16-24 @ 08:42)  SpO2: 100% (09-16-24 @ 08:42) (98% - 100%)  Wt(kg): --    EXAM:  General: Patient appears comfortable, no acute distress  HEENT: NCAT, PERRL, anicteric sclera, mucous membranes moist and intact, poor dentition   Neck: Supple, No lymphadenopathy  CV: +S1/S2, RRR, systolic murmur  Lungs: No respiratory distress, CTA b/l, no wheezing, rales or rhonchi  Abd:  BS4+, Soft, slightly distended, no pain with palpation   : No suprapubic tenderness  Neuro: AAOx3. decreased sensation in the LLE   Ext: No cyanosis, L foot with packed wound on the dorsal aspect without purulence, no pain with examination    Skin: No rash, no phlebitis, No erythema       Labs:                        12.7   21.93 )-----------( 374      ( 15 Sep 2024 21:15 )             38.6     09-15    131<L>  |  97<L>  |  14  ----------------------------<  198<H>  4.3   |  22  |  0.95    Ca    9.4      15 Sep 2024 21:15    TPro  7.8  /  Alb  3.6  /  TBili  0.4  /  DBili  x   /  AST  41<H>  /  ALT  65<H>  /  AlkPhos  68  09-15      WBC Trend:  WBC Count: 21.93 (09-15-24 @ 21:15)  WBC Count: 27.73 (09-12-24 @ 05:45)  WBC Count: 25.47 (09-11-24 @ 23:25)      Auto Neutrophil #: 17.70 K/uL (09-15-24 @ 21:15)  Band Neutrophils %: 0.9 % (09-15-24 @ 21:15)  Auto Neutrophil #: 21.70 K/uL (09-11-24 @ 23:25)  Band Neutrophils %: 1.7 % (09-11-24 @ 23:25)      Creatine Trend:  Creatinine: 0.95 (09-15)  Creatinine: 0.99 (09-12)  Creatinine: 1.10 (09-11)      Liver Biochemical Testing Trend:  Alanine Aminotransferase (ALT/SGPT): 65 *H* (09-15)  Alanine Aminotransferase (ALT/SGPT): 34 (09-11)  Aspartate Aminotransferase (AST/SGOT): 41 (09-15-24 @ 21:15)  Aspartate Aminotransferase (AST/SGOT): 34 (09-11-24 @ 23:25)  Bilirubin Total: 0.4 (09-15)  Bilirubin Total: 0.6 (09-11)    Auto Eosinophil %: 0.0 % (09-15-24 @ 21:15)    MICROBIOLOGY:  Culture - Blood (collected 11 Sep 2024 23:43)  Source: .Blood Blood-Peripheral  Preliminary Report:    No growth at 4 days    Culture - Blood (collected 11 Sep 2024 23:43)  Source: .Blood Blood-Peripheral  Preliminary Report:    No growth at 4 days    Urinalysis with Rflx Culture (collected 11 Sep 2024 23:00)    Procalcitonin: 0.30 (09-11)    C-Reactive Protein: 185.5 (09-15)  C-Reactive Protein: 186.7 (09-11)    A1C with Estimated Average Glucose Result: 8.0 % (09-16-24 @ 02:58)    RADIOLOGY:  < from: Xray Foot AP + Lateral + Oblique, Left (09.15.24 @ 20:56) >  IMPRESSION:  Previously seen thin linear metallic foreign body in the 1st webspace   toes tissues is no longer identified. Residual small amount of localized   mottled air collections in 1st websoft tissues without appreciated   additional tracking. No definite radiographic evidence for osteomyelitis   however if concern remains MRI suggested to further assess.    Suggestion of bone fragmentation of hallux distal phalanx tuft. Intact   aligned remaining imaged osteoarticular structures.    Tarsometatarsal alignment maintained without evidence for a Lisfranc   injury.  Preserved joint spaces and no joint margin erosions.    Posterior calcaneal enthesophyte.    No discrete lytic or blastic lesions.    < from: CT Lower Extremity w/ IV Cont, Left (09.12.24 @ 01:18) >  IMPRESSION:  1.  Linear metallic foreign body of the plantar 1st webspacewith   surrounding soft tissue gas as noted on subsequent x-ray. This may be   related to open wound or gangrene.  2.  No discrete fluid collection is seen.  3.  Borderline left pelvic/groin adenopathy.  4.  Ovoid soft tissue density measuring 2.1 cmwithin the mid to distal   3rd of the left femur. This could reflect focus of red marrow, however,   if there is a history of cancer or concern for metastatic disease   consider follow-up MRI of the left femur for further evaluation.  5.  Preliminaryreport was provided by [facility]. This represents a change in   interpretation from the preliminary report.    < from: Xray Chest 2 Views PA/Lat (09.11.24 @ 23:23) >  IMPRESSION:    No focal consolidation.

## 2024-09-16 NOTE — PATIENT PROFILE ADULT - FALL HARM RISK - HARM RISK INTERVENTIONS

## 2024-09-16 NOTE — PHYSICAL THERAPY INITIAL EVALUATION ADULT - LEVEL OF INDEPENDENCE: SIT/SUPINE, REHAB EVAL
Patient pants and underwear was wet with urine. Place in bag for taking home- daugher at bedside.  ! lilter of fluid given per md order     Karuna Peña, RN  02/14/22 [id number]     supervision

## 2024-09-16 NOTE — H&P ADULT - PROBLEM SELECTOR PLAN 1
Pt met SIRS criteria on admission with WBC 21.93 and HR >90. Suspect sepsis 2/2 infected left foot wound with superimposed cellulitis. Official read of Xray of left foot: Residual small amount of localized mottled air collections in 1st web soft tissues without appreciated additional tracking and no definite radiographic evidence for osteomyelitis.   - Podiatry consult obtained on admission, appreciate recs. S/p bedside I&D by podiatry, with 15 cc of malodorous, purulent drainage expressed.  - S/p IV Vanc 1 g x1, Zosyn 3.375 g x1, and Clinda 600 mg x1 in ED. C/w IV Vanc 1250 mg q12hrs, Zosyn 3.375 g q8hrs, and Clinda 900 mg q8hrs for now as per podiatry recs.  - F/u blood cxs and wound cx  - Pt currently HD stable, will encourage PO hydration  - Monitor VS q4hrs

## 2024-09-16 NOTE — H&P ADULT - NSHPREVIEWOFSYSTEMS_GEN_ALL_CORE
Constitutional: +Fever and chills. No generalized weakness or weight loss.  Eyes: No visual changes, double vision, or eye pain  Ears, Nose, Mouth, Throat: No runny nose, sinus pain, ear pain, tinnitus, sore throat, dysphagia, or odynophagia  Cardiovascular: No chest pain, palpitations, or pitting LE edema  Respiratory: No cough, wheezing, hemoptysis, or shortness of breath  Gastrointestinal: No abdominal pain, nausea/vomiting, diarrhea/constipation, hematemesis, melena, or BRBPR  Genitourinary: No dysuria, frequency, urgency, or hematuria  Musculoskeletal: No neck pain or back pain. No joint pain, swelling, or decreased ROM.  Skin: +L foot redness and swelling. No pruritus or rashes.   Neurologic: No syncope, seizures, headache, paresthesias, numbness, or limb weakness  Psychiatric: No depression, anxiety, or agitation  Endocrine: No heat/cold intolerance, mood swings, sweats, polydipsia, or polyuria  Hematologic/lymphatic: No purpura, petechia, or prolonged or excessive bleeding after dental extraction / injury  Allergic/Immunologic: No anaphylaxis or allergic response to materials, foods, animals    Positives and pertinent negatives noted and all other systems negative.

## 2024-09-16 NOTE — CONSULT NOTE ADULT - SUBJECTIVE AND OBJECTIVE BOX
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 Patient reports a lesion on the dorsum of his foot that appeared yesterday. It enlarged today and burst with blood drainage. He recently left ama on the 12th after having a foreign object removed from his left foot. he states his lesion was not present at that time. He was discharged with augmentin and instructed to follow up in a week. He states he has been compliant with his antibiotic regimen. He denies any fevers, chills, n/v and abdominal pain.      PAST MEDICAL & SURGICAL HISTORY:  Diabetes      CVA (cerebrovascular accident)      No significant past surgical history          MEDICATIONS  (STANDING):  vancomycin  IVPB. 1000 milliGRAM(s) IV Intermittent once    MEDICATIONS  (PRN):      Allergies    No Known Allergies    Intolerances        VITALS:    Vital Signs Last 24 Hrs  T(C): 36.8 (16 Sep 2024 00:13), Max: 36.9 (15 Sep 2024 19:46)  T(F): 98.2 (16 Sep 2024 00:13), Max: 98.4 (15 Sep 2024 19:46)  HR: 88 (16 Sep 2024 00:13) (88 - 104)  BP: 133/65 (16 Sep 2024 00:13) (133/65 - 154/77)  BP(mean): --  RR: 18 (16 Sep 2024 00:13) (18 - 18)  SpO2: 100% (16 Sep 2024 00:13) (98% - 100%)    Parameters below as of 15 Sep 2024 19:46  Patient On (Oxygen Delivery Method): room air        LABS:                          12.7   21.93 )-----------( 374      ( 15 Sep 2024 21:15 )             38.6       09-15    131<L>  |  97<L>  |  14  ----------------------------<  198<H>  4.3   |  22  |  0.95    Ca    9.4      15 Sep 2024 21:15    TPro  7.8  /  Alb  3.6  /  TBili  0.4  /  DBili  x   /  AST  41<H>  /  ALT  65<H>  /  AlkPhos  68  09-15      CAPILLARY BLOOD GLUCOSE      POCT Blood Glucose.: 168 mg/dL (15 Sep 2024 19:50)      PT/INR - ( 15 Sep 2024 21:15 )   PT: 13.9 sec;   INR: 1.25 ratio         PTT - ( 15 Sep 2024 21:15 )  PTT:36.2 sec    LOWER EXTREMITY PHYSICAL EXAM:    Vascular: DP/PT 1/4, B/L, CFT <3 seconds B/L, Temperature gradient warm to warm, left, warm to cool, right  Neuro: Epicritic sensation intact to the level of digits, B/L.  Musculoskeletal/Ortho: unremarkable  Skin: left foot plantar 1st interspace wound to subQ, blister with serous drainage and fluctuance of the dorsal 1st interspace, erythema of the foot globally, strong malodor, right foot no signs of infection      RADIOLOGY & ADDITIONAL STUDIES:    < from: Xray Foot AP + Lateral + Oblique, Left (09.15.24 @ 20:56) >  ******PRELIMINARY REPORT******      ******PRELIMINARY REPORT******         ACC: [id number] EXAM:  XR FOOT COMP MIN 3 VIEWS LT   ORDERED BY: EFRAIN MANJARREZ     PROCEDURE DATE:  09/15/2024    ******PRELIMINARY REPORT******      ******PRELIMINARY REPORT******           INTERPRETATION:  CLINICAL INDICATION: Diabetes, eval for ostia.  TECHNIQUE: 3 views left foot.    COMPARISON: Foot x-ray 9/12/2024.    FINDINGS:    No acute fracture. No definitive cortical erosion or periosteal new bone   formation.  No dislocation.  Joint spaces are maintained.  Edema and focal subcutaneous gas centered around the first and second toe   interdigital space, similar to prior.      IMPRESSION:  Edema and focal subcutaneous gas centered around the first and second toe   interdigital space, similar to prior.    No definitive cortical erosion or periosteal new bone formation to   suggest osteomyelitis, although MR is more sensitive.        ******PRELIMINARY REPORT******      ******PRELIMINARY REPORT******       RIA APONTE MD; Resident Radiologist  This document is a PRELIMINARY interpretation and is pending final   attending approval. Sep 15 2024 11:00PM    < end of copied text >

## 2024-09-16 NOTE — H&P ADULT - PROBLEM SELECTOR PLAN 4
Serum Na 131, corrected Na for hyperglycemia is 133. Unclear etiology. Possibly 2/2 poor PO/solute intake due to active infection vs. SIADH.  - Pt without any IVF overnight. F/u AM CMP.  - Monitor serum Na for now. If hyponatremia worsens, check serum osm, urine osm, and urine Na.

## 2024-09-16 NOTE — CONSULT NOTE ADULT - ATTENDING COMMENTS
This is a 58 y/o M w/ PMHx of DM2, CVA w/ LLE residual WA presenting for L foot wound, seen initially on 9/12, findings of foreign body at that time w/ soft tissue gas, s/p I&D with removal of foreign. Pt then left AMA, sent out on Augmentin, however now returning to the ER with new lesion on dorsum, enlarging w/ bloody drainage.   Pt afebrile, WBC 21 (was 27 on 9/12).  XR L foot w/ mottled air collection in 1st web soft tissues, no OM.  Now s/p bedside I&D w/ podiatry within 1st interspace, then plantar 2nd digit w/ purulence.    #L foot 1st interspace soft tissue infection   #Leukocytosis     Plan:   1. D/c Clindamycin c/w Vanco, f/u level, goal -600. Zosyn 3.375 g q8 via extended infusion    2. F/u BCx, wound Cx   3. Close podiatry f/u, if any other intervention needed  4. Would obtain MRI of L foot given c/f possible OM    Thank you for this consult. Inpatient ID team will follow.    Michel Aburto M.D.  Attending Physician  Division of Infectious Diseases  Department of Medicine    Please contact through MS Teams message.  Office: [phone] (after 5 PM or weekend)

## 2024-09-16 NOTE — H&P ADULT - NSHPSOCIALHISTORY_GEN_ALL_CORE
Denies any tobacco or illicit drug use.  Occasionally drinks 1-2 shots of liquor, not a daily drinker.

## 2024-09-16 NOTE — H&P ADULT - PROBLEM SELECTOR PLAN 8
- DVT ppx: Lovenox SQ 40 mg daily  - Diet: CC/DASH/TLC Pt with history of CVA ~2 years ago with residual left-sided weakness and numbness.  - C/w ASA 81 mg daily   - Hold Plavix 75 mg daily for now pending further podiatry recs regarding possible podiatric surgery under anesthesia. Resume Plavix if not undergoing surgery.   - C/w atorvastatin 80 mg qHS

## 2024-09-16 NOTE — H&P ADULT - PROBLEM SELECTOR PLAN 2
Pt with increasing redness and swelling of his left foot and leg since Friday after pt recently sustained a left foot foreign body puncture wound. Official read of Xray of left foot: Residual small amount of localized mottled air collections in 1st web soft tissues without appreciated additional tracking and no definite radiographic evidence for osteomyelitis.  S/p bedside I&D by podiatry, with 15 cc of malodorous, purulent drainage expressed.  ESR 93, .5  - Plan as above for sepsis  - Local wound care per podiatry vs. possible podiatric surgery under anesthesia  - Pain control with PO Tylenol PRN for mild pain, Tramadol 25 mg q6hrs PRN for moderate pain, and Tramadol 50 mg q6hrs PRN for severe pain  - Trend ESR and CRP  - ID consult to be called in AM  - Fall risk protocol Pt with increasing redness and swelling of his left foot and leg since Friday after pt recently sustained a left foot foreign body puncture wound. Official read of Xray of left foot: Residual small amount of localized mottled air collections in 1st web soft tissues without appreciated additional tracking and no definite radiographic evidence for osteomyelitis.  S/p bedside I&D by podiatry, with 15 cc of malodorous, purulent drainage expressed.  ESR 93, .5  - Plan as above for sepsis  - Local wound care per podiatry vs. possible podiatric surgery under anesthesia --> will need preop eval if pt having podiatric surgery under anesthesia  - Pain control with PO Tylenol PRN for mild pain, Tramadol 25 mg q6hrs PRN for moderate pain, and Tramadol 50 mg q6hrs PRN for severe pain  - Trend ESR and CRP  - ID consult to be called in AM  - Fall risk protocol

## 2024-09-16 NOTE — CONSULT NOTE ADULT - ASSESSMENT
59 M presents for left foot wound  -Pt was seen and evaluated  -Afebrile, WBC 21.93, ESR pending, CRP 18.55  -Left foot plantar 1st interspace wound to subQ, blister with serous drainage and fluctuance of the dorsal 1st interspace, erythema of the foot globally, strong malodor, right foot no signs of infection  -Left foot X-ray: 1st interspace soft tissue emphysema, no OM (resident read)  -Obtained left foot wound culture  -After obtaining verbal consent, the left foot was anesthetized in a proximal [facility] fashion using  15 cc of 1% lidocaine plain. Using a 15 blade, an incision was made within the 1st interspace and then extended to the plantar 2nd digit and proximally to the level of the navicular. 25 cc of malodorous, purulent drainage was expressed. The wound was then flushed with copious amounts of sterile saline. The wound was then packed using 1/2 inch Iodoform packing and dressed with 4x4 gauze, ABD pads, and margaret. Pt tolerated well. neurovascular status of the left foot intact.  -Recommend admit to Dr. Haywood  -Recommend Vancomycin, Zosyn, and Clindamycin  -Recommend ID consult  -Pod Plan: local wound care vs. incision and drainage pending appearance  -Please document medial clearance for possible podiatric surgery under anesthesia  -Discussed with attending  59 M presents for left foot wound  -Pt was seen and evaluated  -Afebrile, WBC 21.93, ESR pending, CRP 18.55  -Left foot plantar 1st interspace wound to subQ, blister with serous drainage and fluctuance of the dorsal 1st interspace, erythema of the foot globally, strong malodor, right foot no signs of infection  -Left foot X-ray: 1st interspace soft tissue emphysema, no OM (resident read)  -Obtained left foot wound culture  -After obtaining verbal consent, the left foot was anesthetized in a proximal [facility] fashion using  15 cc of 1% lidocaine plain. Using a 15 blade, an incision was made within the 1st interspace and then extended to the plantar 2nd digit and proximally to the level of the navicular. 25 cc of malodorous, purulent drainage was expressed. The wound was then flushed with copious amounts of sterile saline. The wound was then packed using 1/2 inch Iodoform packing and dressed with 4x4 gauze, ABD pads, and margaret. Pt tolerated well, neurovascular status of the left foot intact.  -Recommend admit to Dr. Haywood  -Recommend Vancomycin, Zosyn, and Clindamycin  -Recommend ID consult  -Pod Plan: local wound care vs. incision and drainage pending appearance  -Please document medial clearance for possible podiatric surgery under anesthesia  -Discussed with attending  59 M presents for left foot wound  -Pt was seen and evaluated  -Afebrile, WBC 21.93, ESR pending, CRP 18.55  -Left foot plantar 1st interspace wound to subQ, blister with serous drainage and fluctuance of the dorsal 1st interspace, erythema of the foot globally, strong malodor, right foot no signs of infection  -Left foot X-ray: 1st interspace soft tissue emphysema, no OM (resident read)  -Obtained left foot wound culture  -After obtaining verbal consent, the left foot was anesthetized in a proximal [facility] fashion using  15 cc of 1% lidocaine plain. Using a 15 blade, an incision was made within the 1st interspace and then extended to the plantar 2nd digit and proximally to the level of the navicular. 15 cc of malodorous, purulent drainage was expressed. The wound was then flushed with copious amounts of sterile saline. The wound was then packed using 1/2 inch Iodoform packing and dressed with 4x4 gauze, ABD pads, and margaret. Pt tolerated well, neurovascular status of the left foot intact.  -Recommend admit to Dr. Haywood  -Recommend Vancomycin, Zosyn, and Clindamycin  -Recommend ID consult  -Pod Plan: local wound care vs. incision and drainage pending appearance  -Please document medial clearance for possible podiatric surgery under anesthesia  -Discussed with attending

## 2024-09-16 NOTE — PHYSICAL THERAPY INITIAL EVALUATION ADULT - ACTIVE RANGE OF MOTION EXAMINATION, REHAB EVAL
except left ankle / foot/bilateral upper extremity Active ROM was WFL (within functional limits)/bilateral  lower extremity Active ROM was WFL (within functional limits)

## 2024-09-16 NOTE — PROGRESS NOTE ADULT - SUBJECTIVE AND OBJECTIVE BOX
Patient is a 59y old  Male who presents with a chief complaint of Left foot wound (16 Sep 2024 10:29)       INTERVAL HPI/OVERNIGHT EVENTS:  Patient seen and evaluated at bedside.  Pt is resting comfortable in NAD. Denies N/V/F/C.    Allergies    No Known Allergies    Intolerances        Vital Signs Last 24 Hrs  T(C): 36.8 (16 Sep 2024 10:35), Max: 37.8 (16 Sep 2024 03:06)  T(F): 98.3 (16 Sep 2024 10:35), Max: 100 (16 Sep 2024 03:06)  HR: 87 (16 Sep 2024 10:35) (80 - 104)  BP: 134/68 (16 Sep 2024 10:35) (118/59 - 154/77)  BP(mean): --  RR: 18 (16 Sep 2024 10:35) (18 - 18)  SpO2: 100% (16 Sep 2024 10:35) (98% - 100%)    Parameters below as of 16 Sep 2024 10:35  Patient On (Oxygen Delivery Method): room air        LABS:                        12.1   20.30 )-----------( 374      ( 16 Sep 2024 12:57 )             36.9     09-16    131<L>  |  97<L>  |  10  ----------------------------<  318<H>  4.6   |  23  |  0.88    Ca    9.3      16 Sep 2024 12:57  Phos  2.8     09-16  Mg     2.50     09-16    TPro  7.4  /  Alb  3.4  /  TBili  0.6  /  DBili  x   /  AST  26  /  ALT  58<H>  /  AlkPhos  64  09-16    PT/INR - ( 15 Sep 2024 21:15 )   PT: 13.9 sec;   INR: 1.25 ratio         PTT - ( 15 Sep 2024 21:15 )  PTT:36.2 sec  Urinalysis Basic - ( 16 Sep 2024 12:57 )    Color: x / Appearance: x / SG: x / pH: x  Gluc: 318 mg/dL / Ketone: x  / Bili: x / Urobili: x   Blood: x / Protein: x / Nitrite: x   Leuk Esterase: x / RBC: x / WBC x   Sq Epi: x / Non Sq Epi: x / Bacteria: x      CAPILLARY BLOOD GLUCOSE      POCT Blood Glucose.: 270 mg/dL (16 Sep 2024 12:02)  POCT Blood Glucose.: 208 mg/dL (16 Sep 2024 10:46)  POCT Blood Glucose.: 168 mg/dL (15 Sep 2024 19:50)      Lower Extremity Physical Exam:  Vascular: DP/PT 1/4, B/L, CFT <3 seconds B/L, Temperature gradient warm to warm, left, warm to cool, right  Neuro: Epicritic sensation intact to the level of digits, B/L.  Musculoskeletal/Ortho: unremarkable  Skin: left foot plantar 1st interspace wound to subQ, blister with serous drainage and fluctuance of the dorsal 1st interspace, erythema of the foot globally, strong malodor, right foot no signs of infection  RADIOLOGY & ADDITIONAL TESTS:   Patient is a 59y old  Male who presents with a chief complaint of Left foot wound (16 Sep 2024 10:29)       INTERVAL HPI/OVERNIGHT EVENTS:  Patient seen and evaluated at bedside.  Pt is resting comfortable in NAD. Denies N/V/F/C.    Allergies    No Known Allergies    Intolerances        Vital Signs Last 24 Hrs  T(C): 36.8 (16 Sep 2024 10:35), Max: 37.8 (16 Sep 2024 03:06)  T(F): 98.3 (16 Sep 2024 10:35), Max: 100 (16 Sep 2024 03:06)  HR: 87 (16 Sep 2024 10:35) (80 - 104)  BP: 134/68 (16 Sep 2024 10:35) (118/59 - 154/77)  BP(mean): --  RR: 18 (16 Sep 2024 10:35) (18 - 18)  SpO2: 100% (16 Sep 2024 10:35) (98% - 100%)    Parameters below as of 16 Sep 2024 10:35  Patient On (Oxygen Delivery Method): room air        LABS:                        12.1   20.30 )-----------( 374      ( 16 Sep 2024 12:57 )             36.9     09-16    131<L>  |  97<L>  |  10  ----------------------------<  318<H>  4.6   |  23  |  0.88    Ca    9.3      16 Sep 2024 12:57  Phos  2.8     09-16  Mg     2.50     09-16    TPro  7.4  /  Alb  3.4  /  TBili  0.6  /  DBili  x   /  AST  26  /  ALT  58<H>  /  AlkPhos  64  09-16    PT/INR - ( 15 Sep 2024 21:15 )   PT: 13.9 sec;   INR: 1.25 ratio         PTT - ( 15 Sep 2024 21:15 )  PTT:36.2 sec  Urinalysis Basic - ( 16 Sep 2024 12:57 )    Color: x / Appearance: x / SG: x / pH: x  Gluc: 318 mg/dL / Ketone: x  / Bili: x / Urobili: x   Blood: x / Protein: x / Nitrite: x   Leuk Esterase: x / RBC: x / WBC x   Sq Epi: x / Non Sq Epi: x / Bacteria: x      CAPILLARY BLOOD GLUCOSE      POCT Blood Glucose.: 270 mg/dL (16 Sep 2024 12:02)  POCT Blood Glucose.: 208 mg/dL (16 Sep 2024 10:46)  POCT Blood Glucose.: 168 mg/dL (15 Sep 2024 19:50)      Lower Extremity Physical Exam:  Vascular: DP/PT 1/4, B/L, CFT <3 seconds B/L, Temperature gradient warm to warm, left, warm to cool, right  Neuro: Epicritic sensation intact to the level of digits, B/L.  Musculoskeletal/Ortho: unremarkable  Skin: 9/15 s/p bedside Left foot incision and drainage: Left foot plantar 1st interspace wound to subQ, blister with serous drainage and fluctuance of the dorsal 1st interspace, erythema of the foot globally, strong malodor, right foot no signs of infection  RADIOLOGY & ADDITIONAL TESTS:

## 2024-09-16 NOTE — H&P ADULT - NSHPPHYSICALEXAM_GEN_ALL_CORE
Vital Signs Last 24 Hrs  T(C): 36.8 (16 Sep 2024 10:35), Max: 37.8 (16 Sep 2024 03:06)  T(F): 98.3 (16 Sep 2024 10:35), Max: 100 (16 Sep 2024 03:06)  HR: 87 (16 Sep 2024 10:35) (80 - 104)  BP: 134/68 (16 Sep 2024 10:35) (118/59 - 154/77)  BP(mean): --  RR: 18 (16 Sep 2024 10:35) (18 - 18)  SpO2: 100% (16 Sep 2024 10:35) (98% - 100%)    PHYSICAL EXAM:  General: Awake and alert.  No acute distress.  Head: Normocephalic, atraumatic.    Eyes: PERRL.  EOMI.  No scleral icterus.  No conjunctival pallor.  Mouth: Moist MM.  No oropharyngeal exudates.    Neck: Supple.  Full range of motion.  No JVD.  No LAD.  No thyromegaly.  Trachea midline.    Heart: RRR.  Normal S1 and S2.  No murmurs, rubs, or gallops.  No pitting LE edema b/l.   Lungs: Nonlabored breathing.  Good inspiratory effort.  CTAB.  No wheezes, crackles, or rhonchi.    Abdomen: BS+, soft, nontender with no rebound or guarding, nondistended.  No hepatomegaly.   Skin: Warm and dry.  No rashes.  L foot and lower leg with erythema and warmth.  Left foot plantar 1st interspace wound to subQ, blister with serous drainage and fluctuance of the dorsal 1st interspace, erythema and warmth of the left foot and lower leg, no crepitus.  Extremities: No cyanosis.  2+ peripheral pulses b/l.  Musculoskeletal: No joint deformities.  No spinal or paraspinal tenderness.  Neuro: A&Ox3.  CN II-XII intact.  5/5 motor strength in UE and LE b/l.  Tactile sensation intact in UE and LE b/l.  No focal deficits. Vital Signs Last 24 Hrs  T(C): 36.8 (16 Sep 2024 10:35), Max: 37.8 (16 Sep 2024 03:06)  T(F): 98.3 (16 Sep 2024 10:35), Max: 100 (16 Sep 2024 03:06)  HR: 87 (16 Sep 2024 10:35) (80 - 104)  BP: 134/68 (16 Sep 2024 10:35) (118/59 - 154/77)  BP(mean): --  RR: 18 (16 Sep 2024 10:35) (18 - 18)  SpO2: 100% (16 Sep 2024 10:35) (98% - 100%)    PHYSICAL EXAM:  General: Awake and alert.  No acute distress.  Head: Normocephalic, atraumatic.    Eyes: PERRL.  EOMI.  No scleral icterus.  No conjunctival pallor.  Mouth: Moist MM.  No oropharyngeal exudates.    Neck: Supple.  Full range of motion.  No JVD.  No LAD.  No thyromegaly.  Trachea midline.    Heart: RRR.  Normal S1 and S2.  No murmurs, rubs, or gallops.  No pitting LE edema b/l.   Lungs: Nonlabored breathing.  Good inspiratory effort.  CTAB.  No wheezes, crackles, or rhonchi.    Abdomen: BS+, soft, nontender with no rebound or guarding, nondistended.  No hepatomegaly.   Skin: Warm and dry.  No rashes.  L foot and lower leg with erythema and warmth.  Left foot plantar 1st interspace wound to subQ, blister with serous drainage and fluctuance of the dorsal 1st interspace, erythema, warmth, and edema/induration of the left foot and lower leg, no crepitus.  Extremities: No cyanosis.  2+ peripheral pulses b/l.  Musculoskeletal: No joint deformities.  No spinal or paraspinal tenderness.  Neuro: A&Ox3.  CN II-XII intact.  5/5 motor strength in UE and LE b/l.  Tactile sensation intact in UE and LE b/l.  No focal deficits.

## 2024-09-16 NOTE — PATIENT PROFILE ADULT - NSPROREFERSVCHOMEDIABETES_GEN_A_NUR
[Time Spent: ___ minutes] : I have spent [unfilled] minutes of time on the encounter. [>50% of the face to face encounter time was spent on counseling and/or coordination of care for ___] : Greater than 50% of the face to face encounter time was spent on counseling and/or coordination of care for [unfilled] yes

## 2024-09-16 NOTE — CONSULT NOTE ADULT - ASSESSMENT
Impression/Hospital Course:  59M PMHx T2DM on insulin, CVA with residual LLE weakness and numbness presents to the ed for left foot injury. Patient reports a lesion on the dorsum of his foot that appeared yesterday. It enlarged today and burst with blood drainage. He was previously seen in the ED on 9/12/24 after CT scan found foreign body in the first web space with surrounding soft tissue gas and ovoid soft tissue density measuring 2.1 cm with in the mid to distal 3rd left femur with WBC of 27.73. Podiatry was consulted at that time who performed I&D and removed the foreign body, after which patient left ama and was sent home on amoxicillin/clavulanate which he has been taking. Upon return to the ED patient afebrile but tachycardic. Labs showing WBC 21.93, ESR 93, .5, a1c 8.0 and repeat XR shows no foreign body but residual small amount of localized mottled air collections without tracking, bone and fragmentation of the hallux distal phalanx tuft. Again podiatry was consulted who performed I&D which expressed purulence and cultures were taken.    Antimicrobials:  vancomycin 9/15 - current  piperacillin/tazobactam 9/15 - current  clindamycin 9/15 - current    Assessment:  *LLE wound with previously foreign body in the 1st web space s/p removal on 9/12 and recurrent I&D 9/16   *Sepsis as evidenced by leukocytosis and tachycardia secondary to above  *Previous leaving AMA  *Elevated ESR 93 and .5  *DM  *CVA with residual LLE weakness     Recommendations: PLEASE DEFER ALL CHANGES IN PLAN UNTIL SIGNED BY ATTENDING. All recommendations are tentative pending Attending Attestation.  - d/c clindamycin no signs of toxin induced process  - continue piperacillin/tazobactam  - continue vancomycin  - obtain MRSA nares  - trend temperature, ESR/CRP and WBC   - follow blood and wound cultures (received by lab with results pending), adjust antimicrobial therapy based off of culture and sensitivity     Jeison Santos DO, PGY-5   Infectious Disease Fellow  [employer] Teams Preferred  After 5pm/weekends call [phone]  Impression/Hospital Course:  59M PMHx T2DM on insulin, CVA with residual LLE weakness and numbness presents to the ed for left foot injury. Patient reports a lesion on the dorsum of his foot that appeared yesterday. It enlarged today and burst with blood drainage. He was previously seen in the ED on 9/12/24 after CT scan found foreign body in the first web space with surrounding soft tissue gas and ovoid soft tissue density measuring 2.1 cm with in the mid to distal 3rd left femur with WBC of 27.73. Podiatry was consulted at that time who performed I&D and removed the foreign body, after which patient left ama and was sent home on amoxicillin/clavulanate which he has been taking. Upon return to the ED patient afebrile but tachycardic. Labs showing WBC 21.93, ESR 93, .5, a1c 8.0 and repeat XR shows no foreign body but residual small amount of localized mottled air collections without tracking, bone and fragmentation of the hallux distal phalanx tuft. Again podiatry was consulted who performed I&D which expressed purulence and cultures were taken.    Antimicrobials:  vancomycin 9/15 - current  piperacillin/tazobactam 9/15 - current  clindamycin 9/15 - current    Assessment:  *LLE wound with previously foreign body in the 1st web space s/p removal on 9/12 and recurrent I&D 9/16   *Sepsis as evidenced by leukocytosis and tachycardia secondary to above  *Previous leaving AMA  *Elevated ESR 93 and .5  *DM  *CVA with residual LLE weakness     Recommendations:   - d/c clindamycin no signs of toxin induced process  - continue piperacillin/tazobactam  - continue vancomycin  - obtain MRI as patient is at risk for osteomyelitis   - trend temperature, ESR/CRP and WBC   - follow blood and wound cultures (received by lab with results pending), adjust antimicrobial therapy based off of culture and sensitivity     Jeison Santos DO, PGY-5   Infectious Disease Fellow  [employer] Teams Preferred  After 5pm/weekends call [phone]

## 2024-09-16 NOTE — H&P ADULT - PROBLEM SELECTOR PLAN 6
Pt on Novolin 70/30 30 units for breakfast and 15 units for dinner at home.  - Hold Novolin while inpatient. Start Lantus 25 units qAM from Tuesday, will give Lantus 15 units this AM, as pt with poor PO intake on Sunday.   - Start premeal Admelog 3 units qAC TID  - Start low-dose ISS and FS checks qAC TID and qHS  - Possible Endocrine consult if pt with poor glycemic control Hgb 12.7 on admission, was 13.7 on 9/12/24. No S/S of active bleed. Likely from anemia of chronic disease, exacerbated by active infection.  - Monitor H/H   - Check iron studies, folate, and vitamin B12

## 2024-09-16 NOTE — H&P ADULT - HISTORY OF PRESENT ILLNESS
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 Pt was admitted to [facility] on 9/12/24 for a left foot foreign body puncture wound. Xray of the left foot revealed soft tissue infection of the hallux and interdigit space centered around the foreign body with edema and focal gas tracking, highly suspicious for an underlying abscess. Pt was seen by both podiatry and vascular surgery during that admission and had the foreign body, a tiny piece of metal, removed by podiatry. When podiatry attempted to perform a bedside I&D to release the localized gas seen on Xray, pt refused the I&D, stating that he would only want to stay in the hospital for 24 hours for IV antibiotics but would return next week for additional treatment. Pt subsequently left [place] the same day he was admitted, being sent home on Augmentin. The day after pt returned home from [facility], pt noticed that he was having redness and swelling around the left foot wound site that then progressively worsened over the weekend. Pt also had subjective fever for the past couple days and chills upon arrival to the ED. Pt reports that he was taking the Augmentin as prescribed after he left the hospital. Pt denies any chest pain, shortness of breath, palpitations, abdominal pain, nausea, vomiting, diarrhea, constipation, melena, BRBPR, or urinary symptoms.    In the ED,  T 97.3-100F oral, HR , -154/59-77, RR 18, SpO2 % RA.  WBC 21.93. ESR 93, .5.  Got IV Vanc, Zosyn, and Clinda.

## 2024-09-16 NOTE — H&P ADULT - PROBLEM SELECTOR PLAN 7
Pt with history of CVA ~2 years ago with residual left-sided weakness and numbness.  - C/w ASA 81 mg daily   - Hold Plavix 75 mg daily for now pending further podiatry recs regarding possible podiatric surgery under anesthesia  - C/w atorvastatin 80 mg qHS Pt on Novolin 70/30 30 units for breakfast and 15 units for dinner at home.  - Hold Novolin while inpatient. Start Lantus 25 units qAM from Tuesday, will give Lantus 15 units this AM, as pt with poor PO intake on Sunday.   - Start premeal Admelog 3 units qAC TID  - Start low-dose ISS and FS checks qAC TID and qHS  - Possible Endocrine consult if pt with poor glycemic control

## 2024-09-16 NOTE — H&P ADULT - NSHPLABSRESULTS_GEN_ALL_CORE
Labs personally reviewed.                        12.7 21.93 )-----------( 374      ( 15 Sep 2024 21:15 )             38.6     09-15    131<L>  |  97<L>  |  14  ----------------------------<  198<H>  4.3   |  22  |  0.95    Ca    9.4      15 Sep 2024 21:15    TPro  7.8  /  Alb  3.6  /  TBili  0.4  /  DBili  x   /  AST  41<H>  /  ALT  65<H>  /  AlkPhos  68  09-15    ESR 93, .5    Imaging personally reviewed.  ACC: [id number] EXAM:  XR FOOT COMP MIN 3 VIEWS LT   ORDERED BY: EFRAIN MANJARREZ   PROCEDURE DATE:  09/15/2024    COMPARISON: Foot x-ray 9/12/2024.  IMPRESSION:  Previously seen thin linear metallic foreign body in the 1st webspace   toes tissues is no longer identified. Residual small amount of localized   mottled air collections in 1st web soft tissues without appreciated   additional tracking. No definite radiographic evidence for osteomyelitis   however if concern remains MRI suggested to further assess.    Suggestion of bone fragmentation of hallux distal phalanx tuft. Intact   aligned remaining imaged osteoarticular structures.    Tarsometatarsal alignment maintained without evidence for a Lisfranc   injury.  Preserved joint spaces and no joint margin erosions.    Posterior calcaneal enthesophyte.    No discrete lytic or blastic lesions.

## 2024-09-16 NOTE — PHYSICAL THERAPY INITIAL EVALUATION ADULT - ADDITIONAL COMMENTS
patient report lives with daughter in apartment, 5 steps to enter, ambulated with out assistive device.

## 2024-09-17 LAB
ANION GAP SERPL CALC-SCNC: 14 MMOL/L — SIGNIFICANT CHANGE UP (ref 7–14)
BASOPHILS # BLD AUTO: 0.05 K/UL — SIGNIFICANT CHANGE UP (ref 0–0.2)
BASOPHILS NFR BLD AUTO: 0.3 % — SIGNIFICANT CHANGE UP (ref 0–2)
BUN SERPL-MCNC: 11 MG/DL — SIGNIFICANT CHANGE UP (ref 7–23)
CALCIUM SERPL-MCNC: 8.4 MG/DL — SIGNIFICANT CHANGE UP (ref 8.4–10.5)
CHLORIDE SERPL-SCNC: 99 MMOL/L — SIGNIFICANT CHANGE UP (ref 98–107)
CO2 SERPL-SCNC: 21 MMOL/L — LOW (ref 22–31)
CREAT SERPL-MCNC: 0.86 MG/DL — SIGNIFICANT CHANGE UP (ref 0.5–1.3)
CRP SERPL-MCNC: 213.9 MG/L — HIGH
CULTURE RESULTS: SIGNIFICANT CHANGE UP
CULTURE RESULTS: SIGNIFICANT CHANGE UP
EGFR: 99 ML/MIN/1.73M2 — SIGNIFICANT CHANGE UP
EOSINOPHIL # BLD AUTO: 0.17 K/UL — SIGNIFICANT CHANGE UP (ref 0–0.5)
EOSINOPHIL NFR BLD AUTO: 0.9 % — SIGNIFICANT CHANGE UP (ref 0–6)
ERYTHROCYTE [SEDIMENTATION RATE] IN BLOOD: 96 MM/HR — HIGH (ref 1–15)
GLUCOSE BLDC GLUCOMTR-MCNC: 146 MG/DL — HIGH (ref 70–99)
GLUCOSE BLDC GLUCOMTR-MCNC: 208 MG/DL — HIGH (ref 70–99)
GLUCOSE BLDC GLUCOMTR-MCNC: 227 MG/DL — HIGH (ref 70–99)
GLUCOSE BLDC GLUCOMTR-MCNC: 248 MG/DL — HIGH (ref 70–99)
GLUCOSE BLDC GLUCOMTR-MCNC: 287 MG/DL — HIGH (ref 70–99)
GLUCOSE SERPL-MCNC: 195 MG/DL — HIGH (ref 70–99)
HCT VFR BLD CALC: 34.6 % — LOW (ref 39–50)
HGB BLD-MCNC: 11.5 G/DL — LOW (ref 13–17)
IANC: 14.05 K/UL — HIGH (ref 1.8–7.4)
IMM GRANULOCYTES NFR BLD AUTO: 1.4 % — HIGH (ref 0–0.9)
LYMPHOCYTES # BLD AUTO: 15.4 % — SIGNIFICANT CHANGE UP (ref 13–44)
LYMPHOCYTES # BLD AUTO: 2.95 K/UL — SIGNIFICANT CHANGE UP (ref 1–3.3)
MAGNESIUM SERPL-MCNC: 2.5 MG/DL — SIGNIFICANT CHANGE UP (ref 1.6–2.6)
MCHC RBC-ENTMCNC: 28.5 PG — SIGNIFICANT CHANGE UP (ref 27–34)
MCHC RBC-ENTMCNC: 33.2 GM/DL — SIGNIFICANT CHANGE UP (ref 32–36)
MCV RBC AUTO: 85.9 FL — SIGNIFICANT CHANGE UP (ref 80–100)
MONOCYTES # BLD AUTO: 1.67 K/UL — HIGH (ref 0–0.9)
MONOCYTES NFR BLD AUTO: 8.7 % — SIGNIFICANT CHANGE UP (ref 2–14)
NEUTROPHILS # BLD AUTO: 14.05 K/UL — HIGH (ref 1.8–7.4)
NEUTROPHILS NFR BLD AUTO: 73.3 % — SIGNIFICANT CHANGE UP (ref 43–77)
NRBC # BLD: 0 /100 WBCS — SIGNIFICANT CHANGE UP (ref 0–0)
NRBC # FLD: 0 K/UL — SIGNIFICANT CHANGE UP (ref 0–0)
PHOSPHATE SERPL-MCNC: 2.4 MG/DL — LOW (ref 2.5–4.5)
PLATELET # BLD AUTO: 360 K/UL — SIGNIFICANT CHANGE UP (ref 150–400)
POTASSIUM SERPL-MCNC: 4.3 MMOL/L — SIGNIFICANT CHANGE UP (ref 3.5–5.3)
POTASSIUM SERPL-SCNC: 4.3 MMOL/L — SIGNIFICANT CHANGE UP (ref 3.5–5.3)
RBC # BLD: 4.03 M/UL — LOW (ref 4.2–5.8)
RBC # FLD: 12.9 % — SIGNIFICANT CHANGE UP (ref 10.3–14.5)
SODIUM SERPL-SCNC: 134 MMOL/L — LOW (ref 135–145)
SPECIMEN SOURCE: SIGNIFICANT CHANGE UP
SPECIMEN SOURCE: SIGNIFICANT CHANGE UP
VANCOMYCIN TROUGH SERPL-MCNC: 9.8 UG/ML — LOW (ref 10–20)
WBC # BLD: 19.16 K/UL — HIGH (ref 3.8–10.5)
WBC # FLD AUTO: 19.16 K/UL — HIGH (ref 3.8–10.5)

## 2024-09-17 PROCEDURE — 73718 MRI LOWER EXTREMITY W/O DYE: CPT | Mod: 26,LT

## 2024-09-17 PROCEDURE — 99232 SBSQ HOSP IP/OBS MODERATE 35: CPT

## 2024-09-17 RX ORDER — VANCOMYCIN HCL-SODIUM CHLORIDE IV SOLN 1.5 GM/250ML-0.9% 1.5-0.9/25 GM/ML-%
1250 SOLUTION INTRAVENOUS EVERY 12 HOURS
Refills: 0 | Status: DISCONTINUED | OUTPATIENT
Start: 2024-09-17 | End: 2024-09-19

## 2024-09-17 RX ADMIN — ENOXAPARIN SODIUM 40 MILLIGRAM(S): 150 INJECTION SUBCUTANEOUS at 12:25

## 2024-09-17 RX ADMIN — Medication 3 UNIT(S): at 12:26

## 2024-09-17 RX ADMIN — Medication 3: at 12:25

## 2024-09-17 RX ADMIN — Medication 2: at 07:44

## 2024-09-17 RX ADMIN — TRAMADOL HYDROCHLORIDE 50 MILLIGRAM(S): 50 TABLET, COATED ORAL at 11:30

## 2024-09-17 RX ADMIN — Medication 2: at 17:51

## 2024-09-17 RX ADMIN — VANCOMYCIN HCL-SODIUM CHLORIDE IV SOLN 1.5 GM/250ML-0.9% 166.67 MILLIGRAM(S): 1.5-0.9/25 SOLUTION at 07:38

## 2024-09-17 RX ADMIN — Medication 3 UNIT(S): at 08:50

## 2024-09-17 RX ADMIN — ATORVASTATIN CALCIUM 80 MILLIGRAM(S): 10 TABLET, FILM COATED ORAL at 21:41

## 2024-09-17 RX ADMIN — Medication 1 APPLICATION(S): at 12:25

## 2024-09-17 RX ADMIN — PIPERACILLIN SODIUM AND TAZOBACTAM SODIUM 25 GRAM(S): 12; 1.5 INJECTION, POWDER, LYOPHILIZED, FOR SOLUTION INTRAVENOUS at 19:39

## 2024-09-17 RX ADMIN — Medication 3 UNIT(S): at 17:53

## 2024-09-17 RX ADMIN — Medication 81 MILLIGRAM(S): at 12:25

## 2024-09-17 RX ADMIN — TRAMADOL HYDROCHLORIDE 50 MILLIGRAM(S): 50 TABLET, COATED ORAL at 10:46

## 2024-09-17 RX ADMIN — VANCOMYCIN HCL-SODIUM CHLORIDE IV SOLN 1.5 GM/250ML-0.9% 166.67 MILLIGRAM(S): 1.5-0.9/25 SOLUTION at 18:05

## 2024-09-17 RX ADMIN — INSULIN GLARGINE 25 UNIT(S): 300 INJECTION, SOLUTION SUBCUTANEOUS at 07:43

## 2024-09-17 RX ADMIN — PIPERACILLIN SODIUM AND TAZOBACTAM SODIUM 25 GRAM(S): 12; 1.5 INJECTION, POWDER, LYOPHILIZED, FOR SOLUTION INTRAVENOUS at 09:22

## 2024-09-17 NOTE — PROGRESS NOTE ADULT - ASSESSMENT
59M s/p bedside Left foot incision and drainage (DOS 9/15)   - Pt was seen and evaluated  - Afebrile, WBC 19.16  - 9/15 s/p bedside Left foot incision and drainage: Left foot plantar 1st interspace wound to subQ, blister with serous drainage and fluctuance of the dorsal 1st interspace, erythema of the foot globally, strong malodor, scant pus, right foot no signs of infection  - Left foot X-ray: 1st interspace soft tissue emphysema, no OM   - Left foot wound culture pending  - ID recs appreciated   - Possible veraflow vac application on Wed 9/18 pending appearance   - Pod plan local wound care pending appearance   - Please document medial clearance for possible podiatric surgery under anesthesia  - Discussed with attending

## 2024-09-17 NOTE — PROVIDER CONTACT NOTE (CRITICAL VALUE NOTIFICATION) - SITUATION
+ Abcess culture collected on 9/15  .Few poly muscle Nuclear.Leukocyte polip  power seal .Moderate gram -Road per power seal rare gram+cocci

## 2024-09-17 NOTE — PROGRESS NOTE ADULT - ASSESSMENT
This is a 58 y/o M w/ PMHx of DM2, CVA w/ LLE residual WA presenting for L foot wound, seen initially on 9/12, findings of foreign body at that time w/ soft tissue gas, s/p I&D with removal of foreign. Pt then left AMA, sent out on Augmentin, however now returning to the ER with new lesion on dorsum, enlarging w/ bloody drainage.   Pt afebrile, WBC 21 (was 27 on 9/12).  XR L foot w/ mottled air collection in 1st web soft tissues, no OM.  Now s/p bedside I&D w/ podiatry within 1st interspace, then plantar 2nd digit w/ purulence.    #L foot 1st interspace soft tissue infection   #Leukocytosis     Plan:   1. C/w Vanco, f/u level, goal -600. Zosyn 3.375 g q8 via extended infusion    2. F/u BCx, wound Cx, Gram stain with GNR, GPC in pairs. Hope to stop vanco if no MRSA   3. Close podiatry f/u, if any other intervention needed  4. F/u MRI of L foot given c/f possible OM    Thank you for this consult. Inpatient ID team will follow.    Michel Aburto M.D.  Attending Physician  Division of Infectious Diseases  Department of Medicine    Please contact through MS Teams message.  Office: [phone] (after 5 PM or weekend).

## 2024-09-17 NOTE — PROGRESS NOTE ADULT - SUBJECTIVE AND OBJECTIVE BOX
Infectious Diseases Follow Up:    Patient is a 59y old  Male who presents with a chief complaint of Left foot wound infection (16 Sep 2024 18:07)      Interval History/ROS:  No acute events, pt is doing well.   No fevers, WBC slowly downtrending     Allergies  No Known Allergies        ANTIMICROBIALS:  piperacillin/tazobactam IVPB.. 3.375 every 8 hours  vancomycin  IVPB 1250 every 12 hours      Current Abx:     Previous Abx     OTHER MEDS:  MEDICATIONS  (STANDING):  acetaminophen     Tablet .. 650 every 6 hours PRN  aspirin enteric coated 81 daily  atorvastatin 80 at bedtime  dextrose 50% Injectable 25 once  dextrose 50% Injectable 12.5 once  dextrose 50% Injectable 25 once  dextrose Oral Gel 15 once PRN  enoxaparin Injectable 40 every 24 hours  glucagon  Injectable 1 once  influenza   Vaccine 0.5 once  insulin glargine Injectable (LANTUS) 25 every morning  insulin lispro (ADMELOG) corrective regimen sliding scale  three times a day before meals  insulin lispro (ADMELOG) corrective regimen sliding scale  at bedtime  insulin lispro Injectable (ADMELOG) 3 three times a day before meals  traMADol 25 every 6 hours PRN  traMADol 50 every 6 hours PRN      Vital Signs Last 24 Hrs  T(C): 37.1 (17 Sep 2024 05:50), Max: 37.1 (17 Sep 2024 05:50)  T(F): 98.7 (17 Sep 2024 05:50), Max: 98.7 (17 Sep 2024 05:50)  HR: 84 (17 Sep 2024 05:50) (81 - 84)  BP: 130/67 (17 Sep 2024 05:50) (128/62 - 130/67)  BP(mean): --  RR: 18 (17 Sep 2024 05:50) (17 - 18)  SpO2: 99% (17 Sep 2024 05:50) (96% - 99%)    Parameters below as of 17 Sep 2024 05:50  Patient On (Oxygen Delivery Method): room air        PHYSICAL EXAM:  GENERAL: NAD, well-developed  HEAD:  Atraumatic, Normocephalic  EYES: EOMI, conjunctiva and sclera clear  CHEST/LUNG: Clear to auscultation bilaterally; No wheeze  HEART: Regular rate and rhythm; No murmurs, rubs, or gallops  ABDOMEN: Soft, Nontender, Nondistended; Bowel sounds present  EXTREMITIES:  L foot with packed wound on the dorsal aspect without purulence, no pain with examination, wound packed (9/16)  PSYCH: AAOx3                        11.5   19.16 )-----------( 360      ( 17 Sep 2024 05:39 )             34.6       09-17    134[L]  |  99  |  11  ----------------------------<  195[H]  4.3   |  21[L]  |  0.86    Ca    8.4      17 Sep 2024 05:39  Phos  2.4     09-17  Mg     2.50     09-17    TPro  7.4  /  Alb  3.4  /  TBili  0.6  /  DBili  x   /  AST  26  /  ALT  58[H]  /  AlkPhos  64  09-16      Urinalysis Basic - ( 17 Sep 2024 05:39 )    Color: x / Appearance: x / SG: x / pH: x  Gluc: 195 mg/dL / Ketone: x  / Bili: x / Urobili: x   Blood: x / Protein: x / Nitrite: x   Leuk Esterase: x / RBC: x / WBC x   Sq Epi: x / Non Sq Epi: x / Bacteria: x        MICROBIOLOGY:  v  .Abscess left foot  09-15-24   Culture yields >4 types of aerobic and/or anaerobic bacteria  Call client services within 7 days if further workup is clinically  indicated.  --    Few polymorphonuclear leukocytes per low power field  Moderate Gram Negative Rods per oil power field  Rare Gram positive cocci in pairs per oil power field      .Blood Blood-Peripheral  09-15-24   No growth at 24 hours  --  --      .Blood Blood-Peripheral  09-11-24   No growth at 5 days  --  --            RADIOLOGY:    < from: Xray Foot AP + Lateral + Oblique, Left (09.15.24 @ 20:56) >  IMPRESSION:  Previously seen thin linear metallic foreign body in the 1st webspace   toes tissues is no longer identified. Residual small amount of localized   mottled air collections in 1st websoft tissues without appreciated   additional tracking. No definite radiographic evidence for osteomyelitis   however if concern remains MRI suggested to further assess.    Suggestion of bone fragmentation of hallux distal phalanx tuft. Intact   aligned remaining imaged osteoarticular structures.    Tarsometatarsal alignment maintained without evidence for a Lisfranc   injury.  Preserved joint spaces and no joint margin erosions.    Posterior calcaneal enthesophyte.    No discrete lytic or blastic lesions.

## 2024-09-17 NOTE — PROGRESS NOTE ADULT - SUBJECTIVE AND OBJECTIVE BOX
SUBJECTIVE / OVERNIGHT EVENTS:  09-17-24 @ 15:32    MEDICATIONS  (STANDING):  aspirin enteric coated 81 milliGRAM(s) Oral daily  atorvastatin 80 milliGRAM(s) Oral at bedtime  Dakins Solution - 1/4 Strength 1 Application(s) Topical daily  dextrose 5%. 1000 milliLiter(s) (50 mL/Hr) IV Continuous <Continuous>  dextrose 5%. 1000 milliLiter(s) (100 mL/Hr) IV Continuous <Continuous>  dextrose 50% Injectable 25 Gram(s) IV Push once  dextrose 50% Injectable 12.5 Gram(s) IV Push once  dextrose 50% Injectable 25 Gram(s) IV Push once  enoxaparin Injectable 40 milliGRAM(s) SubCutaneous every 24 hours  glucagon  Injectable 1 milliGRAM(s) IntraMuscular once  influenza   Vaccine 0.5 milliLiter(s) IntraMuscular once  insulin glargine Injectable (LANTUS) 25 Unit(s) SubCutaneous every morning  insulin lispro (ADMELOG) corrective regimen sliding scale   SubCutaneous three times a day before meals  insulin lispro (ADMELOG) corrective regimen sliding scale   SubCutaneous at bedtime  insulin lispro Injectable (ADMELOG) 3 Unit(s) SubCutaneous three times a day before meals  piperacillin/tazobactam IVPB.. 3.375 Gram(s) IV Intermittent every 8 hours  vancomycin  IVPB 1250 milliGRAM(s) IV Intermittent every 12 hours    MEDICATIONS  (PRN):  acetaminophen     Tablet .. 650 milliGRAM(s) Oral every 6 hours PRN Temp greater or equal to 38C (100.4F), Mild Pain (1 - 3)  dextrose Oral Gel 15 Gram(s) Oral once PRN Blood Glucose LESS THAN 70 milliGRAM(s)/deciliter  traMADol 25 milliGRAM(s) Oral every 6 hours PRN Moderate Pain (4 - 6)  traMADol 50 milliGRAM(s) Oral every 6 hours PRN Severe Pain (7 - 10)    T(C): 37.1 (09-17-24 @ 14:30), Max: 37.1 (09-17-24 @ 05:50)  HR: 85 (09-17-24 @ 14:30) (81 - 90)  BP: 130/66 (09-17-24 @ 14:30) (128/62 - 135/74)  RR: 18 (09-17-24 @ 14:30) (17 - 18)  SpO2: 99% (09-17-24 @ 14:30) (94% - 99%)    CAPILLARY BLOOD GLUCOSE      POCT Blood Glucose.: 287 mg/dL (17 Sep 2024 12:07)  POCT Blood Glucose.: 146 mg/dL (17 Sep 2024 08:42)  POCT Blood Glucose.: 227 mg/dL (17 Sep 2024 07:37)  POCT Blood Glucose.: 232 mg/dL (16 Sep 2024 22:14)  POCT Blood Glucose.: 264 mg/dL (16 Sep 2024 17:55)    I&O's Summary    16 Sep 2024 07:01  -  17 Sep 2024 07:00  --------------------------------------------------------  IN: 0 mL / OUT: 650 mL / NET: -650 mL        Constitutional: No fever, fatigue  Skin: No rash.  Eyes: No recent vision problems or eye pain.  ENT: No congestion, ear pain, or sore throat.  Cardiovascular: No chest pain or palpation.  Respiratory: No cough, shortness of breath, congestion, or wheezing.  Gastrointestinal: No abdominal pain, nausea, vomiting, or diarrhea.  Genitourinary: No dysuria.  Musculoskeletal: No joint swelling.  Neurologic: No headache.    PHYSICAL EXAM:  GENERAL: NAD  EYES: EOMI, PERRLA  NECK: Supple, No JVD  CHEST/LUNG: cta jai  HEART:  S1 , S2 +  ABDOMEN: soft , bs+  EXTREMITIES:  left foot dressing +  NEUROLOGY:alert awake oriented       LABS:                        11.5   19.16 )-----------( 360      ( 17 Sep 2024 05:39 )             34.6     09-17    134[L]  |  99  |  11  ----------------------------<  195[H]  4.3   |  21[L]  |  0.86    Ca    8.4      17 Sep 2024 05:39  Phos  2.4     09-17  Mg     2.50     09-17    TPro  7.4  /  Alb  3.4  /  TBili  0.6  /  DBili  x   /  AST  26  /  ALT  58[H]  /  AlkPhos  64  09-16    PT/INR - ( 15 Sep 2024 21:15 )   PT: 13.9 sec;   INR: 1.25 ratio         PTT - ( 15 Sep 2024 21:15 )  PTT:36.2 sec      Urinalysis Basic - ( 17 Sep 2024 05:39 )    Color: x / Appearance: x / SG: x / pH: x  Gluc: 195 mg/dL / Ketone: x  / Bili: x / Urobili: x   Blood: x / Protein: x / Nitrite: x   Leuk Esterase: x / RBC: x / WBC x   Sq Epi: x / Non Sq Epi: x / Bacteria: x        RADIOLOGY & ADDITIONAL TESTS:    Imaging Personally Reviewed:    Consultant(s) Notes Reviewed:      Care Discussed with Consultants/Other Providers:

## 2024-09-17 NOTE — PROGRESS NOTE ADULT - SUBJECTIVE AND OBJECTIVE BOX
Patient is a 59y old  Male who presents with a chief complaint of Left foot wound infection (17 Sep 2024 10:59)       INTERVAL HPI/OVERNIGHT EVENTS:  Patient seen and evaluated at bedside.  Pt is resting comfortable in NAD. Denies N/V/F/C.    Allergies    No Known Allergies    Intolerances        Vital Signs Last 24 Hrs  T(C): 37.1 (17 Sep 2024 05:50), Max: 37.1 (17 Sep 2024 05:50)  T(F): 98.7 (17 Sep 2024 05:50), Max: 98.7 (17 Sep 2024 05:50)  HR: 84 (17 Sep 2024 05:50) (81 - 84)  BP: 130/67 (17 Sep 2024 05:50) (128/62 - 130/67)  BP(mean): --  RR: 18 (17 Sep 2024 05:50) (17 - 18)  SpO2: 99% (17 Sep 2024 05:50) (96% - 99%)    Parameters below as of 17 Sep 2024 05:50  Patient On (Oxygen Delivery Method): room air        LABS:                        11.5   19.16 )-----------( 360      ( 17 Sep 2024 05:39 )             34.6     09-17    134[L]  |  99  |  11  ----------------------------<  195[H]  4.3   |  21[L]  |  0.86    Ca    8.4      17 Sep 2024 05:39  Phos  2.4     09-17  Mg     2.50     09-17    TPro  7.4  /  Alb  3.4  /  TBili  0.6  /  DBili  x   /  AST  26  /  ALT  58[H]  /  AlkPhos  64  09-16    PT/INR - ( 15 Sep 2024 21:15 )   PT: 13.9 sec;   INR: 1.25 ratio         PTT - ( 15 Sep 2024 21:15 )  PTT:36.2 sec  Urinalysis Basic - ( 17 Sep 2024 05:39 )    Color: x / Appearance: x / SG: x / pH: x  Gluc: 195 mg/dL / Ketone: x  / Bili: x / Urobili: x   Blood: x / Protein: x / Nitrite: x   Leuk Esterase: x / RBC: x / WBC x   Sq Epi: x / Non Sq Epi: x / Bacteria: x      CAPILLARY BLOOD GLUCOSE      POCT Blood Glucose.: 146 mg/dL (17 Sep 2024 08:42)  POCT Blood Glucose.: 227 mg/dL (17 Sep 2024 07:37)  POCT Blood Glucose.: 232 mg/dL (16 Sep 2024 22:14)  POCT Blood Glucose.: 264 mg/dL (16 Sep 2024 17:55)  POCT Blood Glucose.: 270 mg/dL (16 Sep 2024 12:02)      Lower Extremity Physical Exam:  Vascular: DP/PT 1/4, B/L, CFT <3 seconds B/L, Temperature gradient warm to warm, left, warm to cool, right  Neuro: Epicritic sensation intact to the level of digits, B/L.  Musculoskeletal/Ortho: unremarkable  Skin: 9/15 s/p bedside Left foot incision and drainage: Left foot plantar 1st interspace wound to subQ, blister with serous drainage and fluctuance of the dorsal 1st interspace, erythema of the foot globally, strong malodor, right foot no signs of infection    RADIOLOGY & ADDITIONAL TESTS:

## 2024-09-17 NOTE — PROVIDER CONTACT NOTE (CRITICAL VALUE NOTIFICATION) - TEST AND RESULT REPORTED:
+ abcess culturecollected on 9/15few few keturah muscle.Leukocyte pelop power seal .Moderate gram - Dmitriy per power seal rare gram+cocci

## 2024-09-18 ENCOUNTER — TRANSCRIPTION ENCOUNTER (OUTPATIENT)
Age: 59
End: 2024-09-18

## 2024-09-18 LAB
ANION GAP SERPL CALC-SCNC: 10 MMOL/L — SIGNIFICANT CHANGE UP (ref 7–14)
BASOPHILS # BLD AUTO: 0.05 K/UL — SIGNIFICANT CHANGE UP (ref 0–0.2)
BASOPHILS NFR BLD AUTO: 0.3 % — SIGNIFICANT CHANGE UP (ref 0–2)
BUN SERPL-MCNC: 10 MG/DL — SIGNIFICANT CHANGE UP (ref 7–23)
CALCIUM SERPL-MCNC: 8.6 MG/DL — SIGNIFICANT CHANGE UP (ref 8.4–10.5)
CHLORIDE SERPL-SCNC: 96 MMOL/L — LOW (ref 98–107)
CO2 SERPL-SCNC: 25 MMOL/L — SIGNIFICANT CHANGE UP (ref 22–31)
CREAT SERPL-MCNC: 0.92 MG/DL — SIGNIFICANT CHANGE UP (ref 0.5–1.3)
EGFR: 95 ML/MIN/1.73M2 — SIGNIFICANT CHANGE UP
EOSINOPHIL # BLD AUTO: 0.24 K/UL — SIGNIFICANT CHANGE UP (ref 0–0.5)
EOSINOPHIL NFR BLD AUTO: 1.4 % — SIGNIFICANT CHANGE UP (ref 0–6)
GLUCOSE BLDC GLUCOMTR-MCNC: 256 MG/DL — HIGH (ref 70–99)
GLUCOSE BLDC GLUCOMTR-MCNC: 264 MG/DL — HIGH (ref 70–99)
GLUCOSE BLDC GLUCOMTR-MCNC: 278 MG/DL — HIGH (ref 70–99)
GLUCOSE BLDC GLUCOMTR-MCNC: 278 MG/DL — HIGH (ref 70–99)
GLUCOSE BLDC GLUCOMTR-MCNC: 310 MG/DL — HIGH (ref 70–99)
GLUCOSE SERPL-MCNC: 269 MG/DL — HIGH (ref 70–99)
HCT VFR BLD CALC: 36 % — LOW (ref 39–50)
HGB BLD-MCNC: 11.9 G/DL — LOW (ref 13–17)
IANC: 12.58 K/UL — HIGH (ref 1.8–7.4)
IMM GRANULOCYTES NFR BLD AUTO: 1.4 % — HIGH (ref 0–0.9)
LYMPHOCYTES # BLD AUTO: 15 % — SIGNIFICANT CHANGE UP (ref 13–44)
LYMPHOCYTES # BLD AUTO: 2.56 K/UL — SIGNIFICANT CHANGE UP (ref 1–3.3)
MAGNESIUM SERPL-MCNC: 2.5 MG/DL — SIGNIFICANT CHANGE UP (ref 1.6–2.6)
MCHC RBC-ENTMCNC: 28.4 PG — SIGNIFICANT CHANGE UP (ref 27–34)
MCHC RBC-ENTMCNC: 33.1 GM/DL — SIGNIFICANT CHANGE UP (ref 32–36)
MCV RBC AUTO: 85.9 FL — SIGNIFICANT CHANGE UP (ref 80–100)
MONOCYTES # BLD AUTO: 1.38 K/UL — HIGH (ref 0–0.9)
MONOCYTES NFR BLD AUTO: 8.1 % — SIGNIFICANT CHANGE UP (ref 2–14)
NEUTROPHILS # BLD AUTO: 12.58 K/UL — HIGH (ref 1.8–7.4)
NEUTROPHILS NFR BLD AUTO: 73.8 % — SIGNIFICANT CHANGE UP (ref 43–77)
NRBC # BLD: 0 /100 WBCS — SIGNIFICANT CHANGE UP (ref 0–0)
NRBC # FLD: 0 K/UL — SIGNIFICANT CHANGE UP (ref 0–0)
PHOSPHATE SERPL-MCNC: 2.8 MG/DL — SIGNIFICANT CHANGE UP (ref 2.5–4.5)
PLATELET # BLD AUTO: 436 K/UL — HIGH (ref 150–400)
POTASSIUM SERPL-MCNC: 4.6 MMOL/L — SIGNIFICANT CHANGE UP (ref 3.5–5.3)
POTASSIUM SERPL-SCNC: 4.6 MMOL/L — SIGNIFICANT CHANGE UP (ref 3.5–5.3)
RBC # BLD: 4.19 M/UL — LOW (ref 4.2–5.8)
RBC # FLD: 13 % — SIGNIFICANT CHANGE UP (ref 10.3–14.5)
SODIUM SERPL-SCNC: 131 MMOL/L — LOW (ref 135–145)
WBC # BLD: 17.05 K/UL — HIGH (ref 3.8–10.5)
WBC # FLD AUTO: 17.05 K/UL — HIGH (ref 3.8–10.5)

## 2024-09-18 PROCEDURE — 99232 SBSQ HOSP IP/OBS MODERATE 35: CPT

## 2024-09-18 RX ADMIN — Medication 3: at 12:44

## 2024-09-18 RX ADMIN — Medication 3 UNIT(S): at 17:32

## 2024-09-18 RX ADMIN — VANCOMYCIN HCL-SODIUM CHLORIDE IV SOLN 1.5 GM/250ML-0.9% 166.67 MILLIGRAM(S): 1.5-0.9/25 SOLUTION at 05:38

## 2024-09-18 RX ADMIN — Medication 3: at 08:14

## 2024-09-18 RX ADMIN — Medication 81 MILLIGRAM(S): at 12:44

## 2024-09-18 RX ADMIN — PIPERACILLIN SODIUM AND TAZOBACTAM SODIUM 25 GRAM(S): 12; 1.5 INJECTION, POWDER, LYOPHILIZED, FOR SOLUTION INTRAVENOUS at 01:15

## 2024-09-18 RX ADMIN — PIPERACILLIN SODIUM AND TAZOBACTAM SODIUM 25 GRAM(S): 12; 1.5 INJECTION, POWDER, LYOPHILIZED, FOR SOLUTION INTRAVENOUS at 14:30

## 2024-09-18 RX ADMIN — PIPERACILLIN SODIUM AND TAZOBACTAM SODIUM 25 GRAM(S): 12; 1.5 INJECTION, POWDER, LYOPHILIZED, FOR SOLUTION INTRAVENOUS at 06:49

## 2024-09-18 RX ADMIN — TRAMADOL HYDROCHLORIDE 50 MILLIGRAM(S): 50 TABLET, COATED ORAL at 14:30

## 2024-09-18 RX ADMIN — TRAMADOL HYDROCHLORIDE 50 MILLIGRAM(S): 50 TABLET, COATED ORAL at 08:13

## 2024-09-18 RX ADMIN — INSULIN GLARGINE 25 UNIT(S): 300 INJECTION, SOLUTION SUBCUTANEOUS at 08:13

## 2024-09-18 RX ADMIN — Medication 3: at 17:31

## 2024-09-18 RX ADMIN — VANCOMYCIN HCL-SODIUM CHLORIDE IV SOLN 1.5 GM/250ML-0.9% 166.67 MILLIGRAM(S): 1.5-0.9/25 SOLUTION at 18:45

## 2024-09-18 RX ADMIN — Medication 2: at 22:58

## 2024-09-18 RX ADMIN — Medication 1 APPLICATION(S): at 12:45

## 2024-09-18 RX ADMIN — ENOXAPARIN SODIUM 40 MILLIGRAM(S): 150 INJECTION SUBCUTANEOUS at 12:45

## 2024-09-18 RX ADMIN — PIPERACILLIN SODIUM AND TAZOBACTAM SODIUM 25 GRAM(S): 12; 1.5 INJECTION, POWDER, LYOPHILIZED, FOR SOLUTION INTRAVENOUS at 22:26

## 2024-09-18 RX ADMIN — Medication 3 UNIT(S): at 08:15

## 2024-09-18 RX ADMIN — ATORVASTATIN CALCIUM 80 MILLIGRAM(S): 10 TABLET, FILM COATED ORAL at 22:26

## 2024-09-18 RX ADMIN — Medication 3 UNIT(S): at 12:44

## 2024-09-18 NOTE — PROGRESS NOTE ADULT - ASSESSMENT
59M s/p bedside Left foot incision and drainage (DOS 9/15)   - Pt was seen and evaluated  -   - 9/15 s/p bedside Left foot incision and drainage: Left foot plantar 1st interspace wound to subQ, blister with serous drainage and fluctuance of the dorsal 1st interspace, erythema of the foot globally, strong malodor, scant pus, right foot no signs of infection  - Left foot X-ray: 1st interspace soft tissue emphysema, no OM   - Left foot wound culture pending  - ID recs appreciated   - Possible veraflow vac application on Wed 9/18 pending appearance   - Pod plan local wound care pending appearance   - Please document medial clearance for possible podiatric surgery under anesthesia  - Discussed with attending         Patient left AMA from ED worsening severe infection without I&D originally performed (only FB removal), refusing surgery at this time however cautioned patient that he is likely to lose the first and second toes/rays as well as possibly the forefoot as a result. Aggressive bedside debridement performed in an attempt to control infection will plan on VAC to assist veraflow however viability of the foot remains guarded and the patient will require extensive wound care following hospitalization. Patient shows verbal awareness. Pending labs continuing to downtrend as well as likely PICC long term IV abx to be arranged. 59M s/p bedside Left foot incision and drainage (DOS 9/15)   - Pt was seen and evaluated  - Afebrile, WBC 17.05  - 9/15 s/p bedside Left foot incision and drainage: Left foot plantar 1st interspace wound to subQ, blister with serous drainage and fluctuance of the dorsal 1st interspace, erythema of the foot globally, strong malodor, scant pus, right foot no signs of infection  - Left foot X-ray: 1st interspace soft tissue emphysema, no OM   - Left foot MRI: OM of the distal phalanx of the hallux  - Left foot wound culture pending  - ID recs appreciated   - Veraflow vac application on Wed 9/18  - Pt refusing surgical intervention at this time.  - Pod plan local wound care with veraflowVAC pending appearance   - Please document medial clearance for possible podiatric surgery under anesthesia  - Discussed with attending         Patient left AMA from ED worsening severe infection without I&D originally performed (only FB removal), refusing surgery at this time however cautioned patient that he is likely to lose the first and second toes/rays as well as possibly the forefoot as a result. Aggressive bedside debridement performed in an attempt to control infection will plan on VAC to assist veraflow however viability of the foot remains guarded and the patient will require extensive wound care following hospitalization. Patient shows verbal awareness. Pending labs continuing to downtrend as well as likely PICC long term IV abx to be arranged.

## 2024-09-18 NOTE — DISCHARGE NOTE PROVIDER - NSDCCPCAREPLAN_GEN_ALL_CORE_FT
PRINCIPAL DISCHARGE DIAGNOSIS  Diagnosis: Acute pain of left foot  Assessment and Plan of Treatment: You were found to have osteomylitis of your left foot. You were seen by Podiatry and had a debridement done. Continue the antibiotics as prescribed. Continue the wound vac. You will need to follow up with Podiatry and your Primary care doctor.      SECONDARY DISCHARGE DIAGNOSES  Diagnosis: Type 2 diabetes mellitus treated with insulin  Assessment and Plan of Treatment: You were found to have an elevated HgbA1-c of 8.0. You will need to follow up with your primary care doctor to monitor and adjust your insulin. Outpatient follow up.

## 2024-09-18 NOTE — PROGRESS NOTE ADULT - ASSESSMENT
This is a 60 y/o M w/ PMHx of DM2, CVA w/ LLE residual [place] presenting for L foot wound, seen initially on 9/12, findings of foreign body at that time w/ soft tissue gas, s/p I&D with removal of foreign. Pt then left AMA, sent out on Augmentin, however now returning to the ER with new lesion on dorsum, enlarging w/ bloody drainage.   Pt afebrile, WBC 21 (was 27 on 9/12).  XR L foot w/ mottled air collection in 1st web soft tissues, no OM.  Now s/p bedside I&D w/ podiatry within 1st interspace, then plantar 2nd digit w/ purulence.    #L distal hallux OM, refusing surgical intervention at this time    #L foot 1st interspace soft tissue infection   #Leukocytosis     Plan:   1. C/w Vanco, f/u level, goal -600. Zosyn 3.375 g q8 via extended infusion    2. F/u BCx, wound Cx, Gram stain with GNR, GPC in pairs. Will ask lab to work up bacteria  3. Pt refusing surgery at this time, bedside debridement w/ podiatry, plan for VAC  4. Will likely need PICC, pending organisms, will consider Ertapenem x 6 weeks of therapy     Thank you for this consult. Inpatient ID team will follow.    Michel Aburto M.D.  Attending Physician  Division of Infectious Diseases  Department of Medicine    Please contact through MS Teams message.  Office: [phone] (after 5 PM or weekend).

## 2024-09-18 NOTE — PROGRESS NOTE ADULT - SUBJECTIVE AND OBJECTIVE BOX
Patient is a 59y old  Male who presents with a chief complaint of Left foot wound infection (17 Sep 2024 11:26)       INTERVAL HPI/OVERNIGHT EVENTS:  Patient seen and evaluated at bedside.  Pt is resting comfortable in NAD. Denies N/V/F/C.  Pain rated at X/10    Allergies    No Known Allergies    Intolerances        Vital Signs Last 24 Hrs  T(C): 36.9 (18 Sep 2024 05:42), Max: 37.2 (17 Sep 2024 18:12)  T(F): 98.5 (18 Sep 2024 05:42), Max: 98.9 (17 Sep 2024 18:12)  HR: 85 (18 Sep 2024 05:42) (85 - 95)  BP: 131/49 (18 Sep 2024 05:42) (127/63 - 135/74)  BP(mean): --  RR: 18 (18 Sep 2024 05:42) (17 - 18)  SpO2: 95% (18 Sep 2024 05:42) (94% - 99%)    Parameters below as of 18 Sep 2024 05:42  Patient On (Oxygen Delivery Method): room air        LABS:                        11.9   17.05 )-----------( 436      ( 18 Sep 2024 05:35 )             36.0     09-18    131[L]  |  96[L]  |  10  ----------------------------<  269[H]  4.6   |  25  |  0.92    Ca    8.6      18 Sep 2024 05:35  Phos  2.8     09-18  Mg     2.50     09-18    TPro  7.4  /  Alb  3.4  /  TBili  0.6  /  DBili  x   /  AST  26  /  ALT  58[H]  /  AlkPhos  64  09-16      Urinalysis Basic - ( 18 Sep 2024 05:35 )    Color: x / Appearance: x / SG: x / pH: x  Gluc: 269 mg/dL / Ketone: x  / Bili: x / Urobili: x   Blood: x / Protein: x / Nitrite: x   Leuk Esterase: x / RBC: x / WBC x   Sq Epi: x / Non Sq Epi: x / Bacteria: x      CAPILLARY BLOOD GLUCOSE      POCT Blood Glucose.: 278 mg/dL (18 Sep 2024 06:51)  POCT Blood Glucose.: 248 mg/dL (17 Sep 2024 22:07)  POCT Blood Glucose.: 208 mg/dL (17 Sep 2024 16:48)  POCT Blood Glucose.: 287 mg/dL (17 Sep 2024 12:07)  POCT Blood Glucose.: 146 mg/dL (17 Sep 2024 08:42)      Lower Extremity Physical Exam:  Vascular: DP/PT 1/4, B/L, CFT <3 seconds B/L, Temperature gradient warm to warm, left, warm to cool, right  Neuro: Epicritic sensation intact to the level of digits, B/L.  Musculoskeletal/Ortho: unremarkable  Skin: 9/15 s/p bedside Left foot incision and drainage: Left foot plantar 1st interspace wound to subQ, blister with serous drainage and fluctuance of the dorsal 1st interspace, erythema of the foot globally, strong malodor, right foot no signs of infection    RADIOLOGY & ADDITIONAL TESTS:

## 2024-09-18 NOTE — DISCHARGE NOTE PROVIDER - NSDCMRMEDTOKEN_GEN_ALL_CORE_FT
amoxicillin-clavulanate 875 mg-125 mg oral tablet: 875 milligram(s) orally 2 times a day  aspirin 81 mg oral delayed release tablet: 1 tab(s) orally once a day  atorvastatin 80 mg oral tablet: 1 tab(s) orally once a day (at bedtime)  clopidogrel 75 mg oral tablet: 1 tab(s) orally once a day  NovoLIN 70/30 FlexPen subcutaneous suspension: 30 unit(s) subcutaneous once a day (in the morning)  NovoLIN 70/30 FlexPen subcutaneous suspension: 15 unit(s) subcutaneous once a day (in the evening)   acetaminophen 325 mg oral tablet: 2 tab(s) orally every 6 hours As needed Temp greater or equal to 38C (100.4F), Mild Pain (1 - 3)  amoxicillin-clavulanate 875 mg-125 mg oral tablet: 875 milligram(s) orally 2 times a day until 10/27/24  aspirin 81 mg oral delayed release tablet: 1 tab(s) orally once a day  atorvastatin 80 mg oral tablet: 1 tab(s) orally once a day (at bedtime)  ciprofloxacin 500 mg oral tablet: 1 tab(s) orally 2 times a day until 10/27/24  clopidogrel 75 mg oral tablet: 1 tab(s) orally once a day  collagenase 250 units/g topical ointment: Apply topically to affected area 3 times a week 1 Apply topically to affected area once a day on Monday, Wednesdays and Fridays  NovoLIN 70/30 FlexPen subcutaneous suspension: 30 unit(s) subcutaneous once a day (in the morning)  NovoLIN 70/30 FlexPen subcutaneous suspension: 15 unit(s) subcutaneous once a day (in the evening)  traMADol 50 mg oral tablet: 0.5 tab(s) orally every 6 hours as needed for Moderate Pain (4 - 6) Take 0.5 tablet (25mg) every 4-6 hours as needed for moderate pain (4-6) and take 1 tablet (50mg) every 4-6 hours as needed for severe pain (7-10). MDD: 4

## 2024-09-18 NOTE — DISCHARGE NOTE PROVIDER - NSDCFUADDAPPT_GEN_ALL_CORE_FT
Podiatry Discharge Instructions:  Follow up: Please follow up with Dr. Pal within 1 week of discharge from the hospital, please call [phone] for appointment and discuss that you recently were seen in the hospital.  Wound Care: Veraflow VAC with monday , wednesday and friday changes  Weight bearing: Please weight bear as tolerated in a surgical shoe.  Antibiotics: Please continue as instructed. Podiatry Discharge Instructions:  Follow up: Please follow up with Dr. Pal within 1 week of discharge from the hospital, please call [phone] for appointment and discuss that you recently were seen in the hospital.  Wound Care: Please apply santyl with Black granulofoam VAC at 125mmHg onmonday , wednesday and friday changes  Weight bearing: Please weight bear as tolerated in a surgical shoe.  Antibiotics: Please continue as instructed. Podiatry Discharge Instructions:  Follow up: Please follow up with Dr. Pal within 1 week of discharge from the hospital, please call [phone] for appointment and discuss that you recently were seen in the hospital.  Wound Care: Please apply santyl with Black granulofoam VAC at 125mmHg on monday , wednesday and friday changes  Weight bearing: Please weight bear as tolerated in a surgical shoe.  Antibiotics: Please continue as instructed.

## 2024-09-18 NOTE — PROGRESS NOTE ADULT - SUBJECTIVE AND OBJECTIVE BOX
Infectious Diseases Follow Up:    Patient is a 59y old  Male who presents with a chief complaint of Left foot wound infection (18 Sep 2024 10:25)      Interval History/ROS:  No acute complaints     Allergies  No Known Allergies        ANTIMICROBIALS:  piperacillin/tazobactam IVPB.. 3.375 every 8 hours  vancomycin  IVPB 1250 every 12 hours      Current Abx:     Previous Abx     OTHER MEDS:  MEDICATIONS  (STANDING):  acetaminophen     Tablet .. 650 every 6 hours PRN  aspirin enteric coated 81 daily  atorvastatin 80 at bedtime  dextrose 50% Injectable 25 once  dextrose 50% Injectable 12.5 once  dextrose 50% Injectable 25 once  dextrose Oral Gel 15 once PRN  enoxaparin Injectable 40 every 24 hours  glucagon  Injectable 1 once  influenza   Vaccine 0.5 once  insulin glargine Injectable (LANTUS) 25 every morning  insulin lispro (ADMELOG) corrective regimen sliding scale  three times a day before meals  insulin lispro (ADMELOG) corrective regimen sliding scale  at bedtime  insulin lispro Injectable (ADMELOG) 3 three times a day before meals  traMADol 25 every 6 hours PRN  traMADol 50 every 6 hours PRN      Vital Signs Last 24 Hrs  T(C): 36.9 (18 Sep 2024 10:15), Max: 37.2 (17 Sep 2024 18:12)  T(F): 98.4 (18 Sep 2024 10:15), Max: 98.9 (17 Sep 2024 18:12)  HR: 85 (18 Sep 2024 10:15) (85 - 95)  BP: 148/83 (18 Sep 2024 10:15) (127/63 - 148/83)  BP(mean): --  RR: 17 (18 Sep 2024 10:15) (17 - 18)  SpO2: 96% (18 Sep 2024 10:15) (95% - 99%)    Parameters below as of 18 Sep 2024 10:15  Patient On (Oxygen Delivery Method): room air    PHYSICAL EXAM:  GENERAL: NAD, well-developed  HEAD:  Atraumatic, Normocephalic  CHEST/LUNG: On RA, not in respiratory distress   EXTREMITIES:  L foot wrapped  PSYCH: AAOx3                        11.9   17.05 )-----------( 436      ( 18 Sep 2024 05:35 )             36.0       09-18    131[L]  |  96[L]  |  10  ----------------------------<  269[H]  4.6   |  25  |  0.92    Ca    8.6      18 Sep 2024 05:35  Phos  2.8     09-18  Mg     2.50     09-18    TPro  7.4  /  Alb  3.4  /  TBili  0.6  /  DBili  x   /  AST  26  /  ALT  58[H]  /  AlkPhos  64  09-16      Urinalysis Basic - ( 18 Sep 2024 05:35 )    Color: x / Appearance: x / SG: x / pH: x  Gluc: 269 mg/dL / Ketone: x  / Bili: x / Urobili: x   Blood: x / Protein: x / Nitrite: x   Leuk Esterase: x / RBC: x / WBC x   Sq Epi: x / Non Sq Epi: x / Bacteria: x        MICROBIOLOGY:  Vancomycin Level, Trough: 9.8 ug/mL (09-17-24 @ 15:27)  v  .Abscess left foot  09-15-24   Culture yields >4 types of aerobic and/or anaerobic bacteria  Call client services within 7 days if further workup is clinically  indicated. including  Moderate Aeromonas hydrophila/caviae complex Susceptibility to follow.  --    Few polymorphonuclear leukocytes per low power field  Moderate Gram Negative Rods per oil power field  Rare Gram positive cocci in pairs per oil power field      .Blood Blood-Peripheral  09-15-24   No growth at 48 Hours  --  --      .Blood Blood-Peripheral  09-11-24   No growth at 5 days  --  --                RADIOLOGY:    < from: MR Foot No Cont, Left (09.17.24 @ 21:16) >  IMPRESSION:  1.  Acute osteomyelitis of the distal phalanx of the hallux.  2.  Large soft tissue wound in the dorsum of the foot with surgical   packingmaterial. No retained/residual abscess.    < end of copied text >

## 2024-09-18 NOTE — PROGRESS NOTE ADULT - SUBJECTIVE AND OBJECTIVE BOX
SUBJECTIVE / OVERNIGHT EVENTS:pt seen and examined  09-17-24     MEDICATIONS  (STANDING):  aspirin enteric coated 81 milliGRAM(s) Oral daily  atorvastatin 80 milliGRAM(s) Oral at bedtime  Dakins Solution - 1/4 Strength 1 Application(s) Topical daily  dextrose 5%. 1000 milliLiter(s) (50 mL/Hr) IV Continuous <Continuous>  dextrose 5%. 1000 milliLiter(s) (100 mL/Hr) IV Continuous <Continuous>  dextrose 50% Injectable 25 Gram(s) IV Push once  dextrose 50% Injectable 12.5 Gram(s) IV Push once  dextrose 50% Injectable 25 Gram(s) IV Push once  enoxaparin Injectable 40 milliGRAM(s) SubCutaneous every 24 hours  glucagon  Injectable 1 milliGRAM(s) IntraMuscular once  influenza   Vaccine 0.5 milliLiter(s) IntraMuscular once  insulin glargine Injectable (LANTUS) 25 Unit(s) SubCutaneous every morning  insulin lispro (ADMELOG) corrective regimen sliding scale   SubCutaneous three times a day before meals  insulin lispro (ADMELOG) corrective regimen sliding scale   SubCutaneous at bedtime  insulin lispro Injectable (ADMELOG) 3 Unit(s) SubCutaneous three times a day before meals  piperacillin/tazobactam IVPB.. 3.375 Gram(s) IV Intermittent every 8 hours  vancomycin  IVPB 1250 milliGRAM(s) IV Intermittent every 12 hours    MEDICATIONS  (PRN):  acetaminophen     Tablet .. 650 milliGRAM(s) Oral every 6 hours PRN Temp greater or equal to 38C (100.4F), Mild Pain (1 - 3)  dextrose Oral Gel 15 Gram(s) Oral once PRN Blood Glucose LESS THAN 70 milliGRAM(s)/deciliter  traMADol 25 milliGRAM(s) Oral every 6 hours PRN Moderate Pain (4 - 6)  traMADol 50 milliGRAM(s) Oral every 6 hours PRN Severe Pain (7 - 10)    Vital Signs Last 24 Hrs  T(C): 37 (09-18-24 @ 17:17), Max: 37.2 (09-18-24 @ 02:00)  T(F): 98.6 (09-18-24 @ 17:17), Max: 98.9 (09-18-24 @ 02:00)  HR: 87 (09-18-24 @ 17:17) (82 - 95)  BP: 129/82 (09-18-24 @ 17:17) (128/63 - 148/83)  BP(mean): --  RR: 18 (09-18-24 @ 17:17) (17 - 18)  SpO2: 98% (09-18-24 @ 17:17) (95% - 99%)      Constitutional: No fever, fatigue  Skin: No rash.  Eyes: No recent vision problems or eye pain.  ENT: No congestion, ear pain, or sore throat.  Cardiovascular: No chest pain or palpation.  Respiratory: No cough, shortness of breath, congestion, or wheezing.  Gastrointestinal: No abdominal pain, nausea, vomiting, or diarrhea.  Genitourinary: No dysuria.  Musculoskeletal: No joint swelling.  Neurologic: No headache.    PHYSICAL EXAM:  GENERAL: NAD  EYES: EOMI, PERRLA  NECK: Supple, No JVD  CHEST/LUNG: cta jai  HEART:  S1 , S2 +  ABDOMEN: soft , bs+  EXTREMITIES:  left foot dressing +  NEUROLOGY:alert awake oriented       LABS:  09-18    131[L]  |  96[L]  |  10  ----------------------------<  269[H]  4.6   |  25  |  0.92    Ca    8.6      18 Sep 2024 05:35  Phos  2.8     09-18  Mg     2.50     09-18      Creatinine Trend: 0.92 <--, 0.86 <--, 0.88 <--, 0.95 <--, 0.99 <--, 1.10 <--                        11.9   17.05 )-----------( 436      ( 18 Sep 2024 05:35 )             36.0     Urine Studies:  Urinalysis Basic - ( 18 Sep 2024 05:35 )    Color:  / Appearance:  / SG:  / pH:   Gluc: 269 mg/dL / Ketone:   / Bili:  / Urobili:    Blood:  / Protein:  / Nitrite:    Leuk Esterase:  / RBC:  / WBC    Sq Epi:  / Non Sq Epi:  / Bacteria:                         RADIOLOGY & ADDITIONAL TESTS:    Imaging Personally Reviewed:    Consultant(s) Notes Reviewed:      Care Discussed with Consultants/Other Providers:

## 2024-09-18 NOTE — ADVANCED PRACTICE NURSE CONSULT - REASON FOR CONSULT
Patient seen on skin care rounds for NPWT/VAC placement to left foot. Wound care team will continue to see patient on M W F schedule.     In the event that NPWT VAC therapy is compromised longer than 2 hours, please turn off the machine. Please inform podiatry (#[phone]). Remove the NPWT VAC dressing and cleanse with dakins 1/4 strength solution. Apply Dakins 1/4 strength moistened kerlix to base of wound, cover with dry 4x4 gauze and secure with kerlix. Change daily and PRN if soiled.     Please contact Wound/Ostomy Care Service Line if we can be of further assistance (ext [phone]).

## 2024-09-18 NOTE — DISCHARGE NOTE PROVIDER - CARE PROVIDER_API CALL
Darvin Brice  Internal Medicine  [address] [place] [id number]  Phone: [phone]  Fax: [phone]  Follow Up Time:

## 2024-09-18 NOTE — DISCHARGE NOTE PROVIDER - HOSPITAL COURSE
60 y/o Male, with a PmHx of T2DM, CVA w/residual LLE weakness and numbness, p/w left foot injury.    Left foot wound  - Left foot plantar 1st interspace wound to subQ, blister with serous drainage and fluctuance of the dorsal 1st interspace, erythema of the foot globally, strong malodor, right foot no signs of infection  - Left foot X-ray - 1st interspace soft tissue emphysema, no OM   - 9/17 MRI of L foot - Acute osteomyelitis of the distal phalanx of the hallux.  - Bcx negative   - Wound Cx, Aeromonas, Porteus vulgaris, Kleb pneumoniae, staph lugdunensis    - s/p I&D in ED. The wound was then packed using 1/2 inch Iodoform packing and dressed with 4x4 gauze, ABD pads, and margaret. Pt tolerated well, neurovascular status of the left foot intact.  - House ID c/s following - Zosyn (when ready for DC send on Augmentin 875/125 mg BID, Ciprofloxacin 500 mg BID for total 6 week course until 10/27/24)    Type 2 DM  - Hg A1c 8%  - on basal/bolus   - at home on Novolin 70/30 30 units for breakfast and 15 units for dinner at home.  - Hold Novolin while inpatient. Start Lantus 25 units qAM from Tuesday, will give Lantus 15 units this AM, as pt with poor PO intake on Sunday.   - Start premeal Admelog 3 units qAC TID  - Start low-dose ISS and FS checks qAC TID and qHS    Hyponatremia.   - Serum Na 131, corrected Na for hyperglycemia is 133. Unclear etiology. Possibly 2/2 poor PO/solute intake due to active infection vs. SIADH.  - monitor Na    Transaminitis.   - AST 41 and ALT 65. Possibly in setting of sepsis.   - Trend LFTs for now.    History of CVA   ~2 years ago with residual left-sided weakness and numbness.  - C/w ASA 81 mg daily   - Hold Plavix 75 mg daily for now pending further podiatry recs regarding possible podiatric surgery under anesthesia; resumed  - C/w atorvastatin 80 mg qHS.    Pt comfortable at this time and is now medically cleared for discharge home as per Podiatry and Dr. Guthrie. Outpatient follow up. Continue wound vac as prescribed.    Reviewed discharge medications with patient; All new medications requiring new prescription sent to pharmacy of patients choice. Reviewed need for prescription from previous home medications and new prescriptions sent if requested. Patient in agreement and understands.

## 2024-09-18 NOTE — PROGRESS NOTE ADULT - ASSESSMENT
58 yo man with history of type 2 DM (on insulin) and CVA (~2 years ago) with residual left-sided weakness and numbness presents with increasing redness and swelling of his left foot and leg after pt sustained a left foot foreign body puncture wound, admitted with sepsis likely 2/2 infected left foot wound with superimposed cellulitis.

## 2024-09-19 LAB
-  AMOXICILLIN/CLAVULANIC ACID: SIGNIFICANT CHANGE UP
-  AMOXICILLIN/CLAVULANIC ACID: SIGNIFICANT CHANGE UP
-  AMPICILLIN/SULBACTAM: SIGNIFICANT CHANGE UP
-  AMPICILLIN: SIGNIFICANT CHANGE UP
-  AMPICILLIN: SIGNIFICANT CHANGE UP
-  AZTREONAM: SIGNIFICANT CHANGE UP
-  CEFAZOLIN: SIGNIFICANT CHANGE UP
-  CEFEPIME: SIGNIFICANT CHANGE UP
-  CEFOXITIN: SIGNIFICANT CHANGE UP
-  CEFTAZIDIME: SIGNIFICANT CHANGE UP
-  CEFTRIAXONE: SIGNIFICANT CHANGE UP
-  CIPROFLOXACIN: SIGNIFICANT CHANGE UP
-  CLINDAMYCIN: SIGNIFICANT CHANGE UP
-  ERTAPENEM: SIGNIFICANT CHANGE UP
-  ERTAPENEM: SIGNIFICANT CHANGE UP
-  ERYTHROMYCIN: SIGNIFICANT CHANGE UP
-  GENTAMICIN: SIGNIFICANT CHANGE UP
-  IMIPENEM: SIGNIFICANT CHANGE UP
-  LEVOFLOXACIN: SIGNIFICANT CHANGE UP
-  MEROPENEM: SIGNIFICANT CHANGE UP
-  OXACILLIN: SIGNIFICANT CHANGE UP
-  PENICILLIN: SIGNIFICANT CHANGE UP
-  PIPERACILLIN/TAZOBACTAM: SIGNIFICANT CHANGE UP
-  RIFAMPIN: SIGNIFICANT CHANGE UP
-  TETRACYCLINE: SIGNIFICANT CHANGE UP
-  TOBRAMYCIN: SIGNIFICANT CHANGE UP
-  TOBRAMYCIN: SIGNIFICANT CHANGE UP
-  TRIMETHOPRIM/SULFAMETHOXAZOLE: SIGNIFICANT CHANGE UP
-  VANCOMYCIN: SIGNIFICANT CHANGE UP
ANION GAP SERPL CALC-SCNC: 12 MMOL/L — SIGNIFICANT CHANGE UP (ref 7–14)
BUN SERPL-MCNC: 11 MG/DL — SIGNIFICANT CHANGE UP (ref 7–23)
CALCIUM SERPL-MCNC: 9 MG/DL — SIGNIFICANT CHANGE UP (ref 8.4–10.5)
CHLORIDE SERPL-SCNC: 94 MMOL/L — LOW (ref 98–107)
CO2 SERPL-SCNC: 24 MMOL/L — SIGNIFICANT CHANGE UP (ref 22–31)
CREAT SERPL-MCNC: 0.92 MG/DL — SIGNIFICANT CHANGE UP (ref 0.5–1.3)
EGFR: 95 ML/MIN/1.73M2 — SIGNIFICANT CHANGE UP
GLUCOSE BLDC GLUCOMTR-MCNC: 187 MG/DL — HIGH (ref 70–99)
GLUCOSE BLDC GLUCOMTR-MCNC: 204 MG/DL — HIGH (ref 70–99)
GLUCOSE BLDC GLUCOMTR-MCNC: 222 MG/DL — HIGH (ref 70–99)
GLUCOSE BLDC GLUCOMTR-MCNC: 226 MG/DL — HIGH (ref 70–99)
GLUCOSE BLDC GLUCOMTR-MCNC: 295 MG/DL — HIGH (ref 70–99)
GLUCOSE SERPL-MCNC: 340 MG/DL — HIGH (ref 70–99)
HCT VFR BLD CALC: 38.2 % — LOW (ref 39–50)
HGB BLD-MCNC: 12.7 G/DL — LOW (ref 13–17)
MCHC RBC-ENTMCNC: 28.5 PG — SIGNIFICANT CHANGE UP (ref 27–34)
MCHC RBC-ENTMCNC: 33.2 GM/DL — SIGNIFICANT CHANGE UP (ref 32–36)
MCV RBC AUTO: 85.8 FL — SIGNIFICANT CHANGE UP (ref 80–100)
METHOD TYPE: SIGNIFICANT CHANGE UP
NRBC # BLD: 0 /100 WBCS — SIGNIFICANT CHANGE UP (ref 0–0)
NRBC # FLD: 0 K/UL — SIGNIFICANT CHANGE UP (ref 0–0)
PLATELET # BLD AUTO: 477 K/UL — HIGH (ref 150–400)
POTASSIUM SERPL-MCNC: 4.9 MMOL/L — SIGNIFICANT CHANGE UP (ref 3.5–5.3)
POTASSIUM SERPL-SCNC: 4.9 MMOL/L — SIGNIFICANT CHANGE UP (ref 3.5–5.3)
RBC # BLD: 4.45 M/UL — SIGNIFICANT CHANGE UP (ref 4.2–5.8)
RBC # FLD: 12.6 % — SIGNIFICANT CHANGE UP (ref 10.3–14.5)
SODIUM SERPL-SCNC: 130 MMOL/L — LOW (ref 135–145)
VANCOMYCIN TROUGH SERPL-MCNC: 8.8 UG/ML — LOW (ref 10–20)
WBC # BLD: 17.57 K/UL — HIGH (ref 3.8–10.5)
WBC # FLD AUTO: 17.57 K/UL — HIGH (ref 3.8–10.5)

## 2024-09-19 PROCEDURE — 99232 SBSQ HOSP IP/OBS MODERATE 35: CPT

## 2024-09-19 RX ADMIN — Medication 2: at 16:55

## 2024-09-19 RX ADMIN — TRAMADOL HYDROCHLORIDE 50 MILLIGRAM(S): 50 TABLET, COATED ORAL at 21:26

## 2024-09-19 RX ADMIN — Medication 3 UNIT(S): at 16:55

## 2024-09-19 RX ADMIN — PIPERACILLIN SODIUM AND TAZOBACTAM SODIUM 25 GRAM(S): 12; 1.5 INJECTION, POWDER, LYOPHILIZED, FOR SOLUTION INTRAVENOUS at 14:25

## 2024-09-19 RX ADMIN — PIPERACILLIN SODIUM AND TAZOBACTAM SODIUM 25 GRAM(S): 12; 1.5 INJECTION, POWDER, LYOPHILIZED, FOR SOLUTION INTRAVENOUS at 21:05

## 2024-09-19 RX ADMIN — ENOXAPARIN SODIUM 40 MILLIGRAM(S): 150 INJECTION SUBCUTANEOUS at 12:43

## 2024-09-19 RX ADMIN — Medication 3 UNIT(S): at 08:20

## 2024-09-19 RX ADMIN — TRAMADOL HYDROCHLORIDE 50 MILLIGRAM(S): 50 TABLET, COATED ORAL at 20:26

## 2024-09-19 RX ADMIN — Medication 1: at 08:19

## 2024-09-19 RX ADMIN — VANCOMYCIN HCL-SODIUM CHLORIDE IV SOLN 1.5 GM/250ML-0.9% 166.67 MILLIGRAM(S): 1.5-0.9/25 SOLUTION at 10:02

## 2024-09-19 RX ADMIN — Medication 81 MILLIGRAM(S): at 12:43

## 2024-09-19 RX ADMIN — ATORVASTATIN CALCIUM 80 MILLIGRAM(S): 10 TABLET, FILM COATED ORAL at 21:12

## 2024-09-19 RX ADMIN — Medication 3: at 12:43

## 2024-09-19 RX ADMIN — PIPERACILLIN SODIUM AND TAZOBACTAM SODIUM 25 GRAM(S): 12; 1.5 INJECTION, POWDER, LYOPHILIZED, FOR SOLUTION INTRAVENOUS at 06:38

## 2024-09-19 RX ADMIN — Medication 3 UNIT(S): at 12:43

## 2024-09-19 RX ADMIN — INSULIN GLARGINE 25 UNIT(S): 300 INJECTION, SOLUTION SUBCUTANEOUS at 08:19

## 2024-09-19 NOTE — PROGRESS NOTE ADULT - SUBJECTIVE AND OBJECTIVE BOX
SUBJECTIVE / OVERNIGHT EVENTS:pt seen and examined  09-19-24     MEDICATIONS  (STANDING):  aspirin enteric coated 81 milliGRAM(s) Oral daily  atorvastatin 80 milliGRAM(s) Oral at bedtime  Dakins Solution - 1/4 Strength 1 Application(s) Topical daily  dextrose 5%. 1000 milliLiter(s) (50 mL/Hr) IV Continuous <Continuous>  dextrose 5%. 1000 milliLiter(s) (100 mL/Hr) IV Continuous <Continuous>  dextrose 50% Injectable 25 Gram(s) IV Push once  dextrose 50% Injectable 25 Gram(s) IV Push once  dextrose 50% Injectable 12.5 Gram(s) IV Push once  enoxaparin Injectable 40 milliGRAM(s) SubCutaneous every 24 hours  glucagon  Injectable 1 milliGRAM(s) IntraMuscular once  influenza   Vaccine 0.5 milliLiter(s) IntraMuscular once  insulin glargine Injectable (LANTUS) 25 Unit(s) SubCutaneous every morning  insulin lispro (ADMELOG) corrective regimen sliding scale   SubCutaneous three times a day before meals  insulin lispro (ADMELOG) corrective regimen sliding scale   SubCutaneous at bedtime  insulin lispro Injectable (ADMELOG) 3 Unit(s) SubCutaneous three times a day before meals  piperacillin/tazobactam IVPB.. 3.375 Gram(s) IV Intermittent every 8 hours    MEDICATIONS  (PRN):  acetaminophen     Tablet .. 650 milliGRAM(s) Oral every 6 hours PRN Temp greater or equal to 38C (100.4F), Mild Pain (1 - 3)  dextrose Oral Gel 15 Gram(s) Oral once PRN Blood Glucose LESS THAN 70 milliGRAM(s)/deciliter  traMADol 25 milliGRAM(s) Oral every 6 hours PRN Moderate Pain (4 - 6)  traMADol 50 milliGRAM(s) Oral every 6 hours PRN Severe Pain (7 - 10)    Vital Signs Last 24 Hrs  T(C): 36.8 (09-19-24 @ 21:04), Max: 36.9 (09-19-24 @ 17:31)  T(F): 98.3 (09-19-24 @ 21:04), Max: 98.4 (09-19-24 @ 17:31)  HR: 81 (09-19-24 @ 21:04) (81 - 100)  BP: 146/67 (09-19-24 @ 21:04) (128/73 - 151/69)  BP(mean): --  RR: 17 (09-19-24 @ 21:04) (16 - 17)  SpO2: 100% (09-19-24 @ 21:04) (96% - 100%)        Constitutional: No fever, fatigue  Skin: No rash.  Eyes: No recent vision problems or eye pain.  ENT: No congestion, ear pain, or sore throat.  Cardiovascular: No chest pain or palpation.  Respiratory: No cough, shortness of breath, congestion, or wheezing.  Gastrointestinal: No abdominal pain, nausea, vomiting, or diarrhea.  Genitourinary: No dysuria.  Musculoskeletal: No joint swelling.  Neurologic: No headache.    PHYSICAL EXAM:  GENERAL: NAD  EYES: EOMI, PERRLA  NECK: Supple, No JVD  CHEST/LUNG: cta jai  HEART:  S1 , S2 +  ABDOMEN: soft , bs+  EXTREMITIES:  left foot dressing +  NEUROLOGY:alert awake oriented       LABS:  09-19    130[L]  |  94[L]  |  11  ----------------------------<  340[H]  4.9   |  24  |  0.92    Ca    9.0      19 Sep 2024 12:31  Phos  2.8     09-18  Mg     2.50     09-18      Creatinine Trend: 0.92 <--, 0.92 <--, 0.86 <--, 0.88 <--, 0.95 <--                        12.7   17.57 )-----------( 477      ( 19 Sep 2024 12:31 )             38.2     Urine Studies:  Urinalysis Basic - ( 19 Sep 2024 12:31 )    Color:  / Appearance:  / SG:  / pH:   Gluc: 340 mg/dL / Ketone:   / Bili:  / Urobili:    Blood:  / Protein:  / Nitrite:    Leuk Esterase:  / RBC:  / WBC    Sq Epi:  / Non Sq Epi:  / Bacteria:                                     RADIOLOGY & ADDITIONAL TESTS:    Imaging Personally Reviewed:    Consultant(s) Notes Reviewed:      Care Discussed with Consultants/Other Providers:

## 2024-09-19 NOTE — PROGRESS NOTE ADULT - ASSESSMENT
This is a 60 y/o M w/ PMHx of DM2, CVA w/ LLE residual WA presenting for L foot wound, seen initially on 9/12, findings of foreign body at that time w/ soft tissue gas, s/p I&D with removal of foreign. Pt then left AMA, sent out on Augmentin, however now returning to the ER with new lesion on dorsum, enlarging w/ bloody drainage.   Pt afebrile, WBC 21 (was 27 on 9/12).  XR L foot w/ mottled air collection in 1st web soft tissues, no OM.  Now s/p bedside I&D w/ podiatry within 1st interspace, then plantar 2nd digit w/ purulence.    #L distal hallux OM, refusing surgical intervention at this time    #L foot 1st interspace soft tissue infection   #Leukocytosis     Plan:   1. D/c Vanco. c/w Zosyn 3.375 g q8 via extended infusion    2. F/u BCx, wound Cx, Aeromonas, Porteus vulgaris, Kleb pneumoniae, staph lugdunesis.    3. Pt refusing surgery at this time, bedside debridement w/ podiatry, s/p VAC  4. When ready for discharge, pt can be sent on Augmentin/Ciprofloxacin for total 6 week course       Thank you for this consult. Inpatient ID team will follow.    Michel Aburto M.D.  Attending Physician  Division of Infectious Diseases  Department of Medicine    Please contact through MS Teams message.  Office: [phone] (after 5 PM or weekend).

## 2024-09-19 NOTE — PROGRESS NOTE ADULT - SUBJECTIVE AND OBJECTIVE BOX
Infectious Diseases Follow Up:    Patient is a 59y old  Male who presents with a chief complaint of Left foot wound infection (19 Sep 2024 06:15)      Interval History/ROS:   No acute complaints     Allergies  No Known Allergies        ANTIMICROBIALS:  piperacillin/tazobactam IVPB.. 3.375 every 8 hours  vancomycin  IVPB 1250 every 12 hours      Current Abx:     Previous Abx     OTHER MEDS:  MEDICATIONS  (STANDING):  acetaminophen     Tablet .. 650 every 6 hours PRN  aspirin enteric coated 81 daily  atorvastatin 80 at bedtime  dextrose 50% Injectable 25 once  dextrose 50% Injectable 12.5 once  dextrose 50% Injectable 25 once  dextrose Oral Gel 15 once PRN  enoxaparin Injectable 40 every 24 hours  glucagon  Injectable 1 once  influenza   Vaccine 0.5 once  insulin glargine Injectable (LANTUS) 25 every morning  insulin lispro (ADMELOG) corrective regimen sliding scale  three times a day before meals  insulin lispro (ADMELOG) corrective regimen sliding scale  at bedtime  insulin lispro Injectable (ADMELOG) 3 three times a day before meals  traMADol 25 every 6 hours PRN  traMADol 50 every 6 hours PRN      Vital Signs Last 24 Hrs  T(C): 36.7 (19 Sep 2024 13:51), Max: 37 (18 Sep 2024 17:17)  T(F): 98 (19 Sep 2024 13:51), Max: 98.6 (18 Sep 2024 17:17)  HR: 100 (19 Sep 2024 13:51) (87 - 100)  BP: 151/69 (19 Sep 2024 13:51) (128/73 - 151/69)  BP(mean): --  RR: 16 (19 Sep 2024 13:51) (16 - 18)  SpO2: 99% (19 Sep 2024 13:51) (96% - 100%)    Parameters below as of 19 Sep 2024 13:51  Patient On (Oxygen Delivery Method): room air          PHYSICAL EXAM:  GENERAL: NAD, well-developed  HEAD:  Atraumatic, Normocephalic  CHEST/LUNG: On RA, not in respiratory distress   EXTREMITIES:  L foot wrapped  PSYCH: AAOx3                                   12.7   17.57 )-----------( 477      ( 19 Sep 2024 12:31 )             38.2       09-19    130[L]  |  94[L]  |  11  ----------------------------<  340[H]  4.9   |  24  |  0.92    Ca    9.0      19 Sep 2024 12:31  Phos  2.8     09-18  Mg     2.50     09-18        Urinalysis Basic - ( 19 Sep 2024 12:31 )    Color: x / Appearance: x / SG: x / pH: x  Gluc: 340 mg/dL / Ketone: x  / Bili: x / Urobili: x   Blood: x / Protein: x / Nitrite: x   Leuk Esterase: x / RBC: x / WBC x   Sq Epi: x / Non Sq Epi: x / Bacteria: x        MICROBIOLOGY:  Vancomycin Level, Trough: 8.8 ug/mL (09-19-24 @ 06:50)  v  .Abscess left foot  09-15-24   Culture yields >4 types of aerobic and/or anaerobic bacteria  Call client services within 7 days if further workup is clinically  indicated. including  Moderate Aeromonas hydrophila/caviae complex  Moderate Proteus vulgaris group  Few Staphylococcus epidermidis "Susceptibilities not performed"  Moderate Klebsiella pneumoniae  Few Staphylococcus lugdunensis  Unable to evaluate further due to Proteus overgrowth  --  Proteus vulgaris group  Aeromonas hydrophila/caviae complex  Klebsiella pneumoniae  Staphylococcus lugdunensis      .Blood Blood-Peripheral  09-15-24   No growth at 72 Hours  --  --      .Blood Blood-Peripheral  09-11-24   No growth at 5 days  --  --                RADIOLOGY:

## 2024-09-19 NOTE — PROGRESS NOTE ADULT - ASSESSMENT
59M s/p bedside Left foot incision and drainage (DOS 9/15)   - Pt was seen and evaluated  - Afebrile, labs pending  - 9/15 s/p bedside Left foot incision and drainage: veraflow VAC applied. Normal cap refill for toes 1, 3-5 <3 seconds, no ischemic changes. Right foot no open wounds, no signs of infection.  - Left foot X-ray: 1st interspace soft tissue emphysema, no OM   - Left foot MRI: OM of the distal phalanx of the hallux  - Left foot wound culture: growing >4 types of aerobics and/or anaerobic bacteria.  - ID recs appreciated   - Pt refusing surgical intervention at this time.  - Pod plan local wound care with veraflowVAC pending appearance   - Please document medial clearance for possible podiatric surgery under anesthesia  - Discussed with attending         Patient left AMA from ED worsening severe infection without I&D originally performed (only FB removal), refusing surgery at this time however cautioned patient that he is likely to lose the first and second toes/rays as well as possibly the forefoot as a result. Aggressive bedside debridement performed in an attempt to control infection will plan on VAC to assist veraflow however viability of the foot remains guarded and the patient will require extensive wound care following hospitalization. Patient shows verbal awareness. Pending labs continuing to downtrend as well as likely PICC long term IV abx to be arranged.

## 2024-09-19 NOTE — PROGRESS NOTE ADULT - SUBJECTIVE AND OBJECTIVE BOX
Patient is a 59y old  Male who presents with a chief complaint of Left foot wound infection (18 Sep 2024 14:27)       INTERVAL HPI/OVERNIGHT EVENTS:  Patient seen and evaluated at bedside.  Pt is resting comfortable in NAD. Denies N/V/F/C.    Allergies    No Known Allergies    Intolerances        Vital Signs Last 24 Hrs  T(C): 36.8 (19 Sep 2024 00:32), Max: 37 (18 Sep 2024 14:06)  T(F): 98.2 (19 Sep 2024 00:32), Max: 98.6 (18 Sep 2024 14:06)  HR: 88 (19 Sep 2024 00:32) (82 - 88)  BP: 134/69 (19 Sep 2024 00:32) (129/82 - 148/83)  BP(mean): --  RR: 16 (19 Sep 2024 00:32) (16 - 18)  SpO2: 96% (19 Sep 2024 00:32) (96% - 98%)    Parameters below as of 19 Sep 2024 00:32  Patient On (Oxygen Delivery Method): room air        LABS:                        11.9   17.05 )-----------( 436      ( 18 Sep 2024 05:35 )             36.0     09-18    131[L]  |  96[L]  |  10  ----------------------------<  269[H]  4.6   |  25  |  0.92    Ca    8.6      18 Sep 2024 05:35  Phos  2.8     09-18  Mg     2.50     09-18        Urinalysis Basic - ( 18 Sep 2024 05:35 )    Color: x / Appearance: x / SG: x / pH: x  Gluc: 269 mg/dL / Ketone: x  / Bili: x / Urobili: x   Blood: x / Protein: x / Nitrite: x   Leuk Esterase: x / RBC: x / WBC x   Sq Epi: x / Non Sq Epi: x / Bacteria: x      CAPILLARY BLOOD GLUCOSE      POCT Blood Glucose.: 310 mg/dL (18 Sep 2024 22:38)  POCT Blood Glucose.: 264 mg/dL (18 Sep 2024 17:03)  POCT Blood Glucose.: 256 mg/dL (18 Sep 2024 12:04)  POCT Blood Glucose.: 278 mg/dL (18 Sep 2024 08:10)  POCT Blood Glucose.: 278 mg/dL (18 Sep 2024 06:51)      Lower Extremity Physical Exam:  Vascular: DP/PT 1/4, B/L, CFT <3 seconds B/L, Temperature gradient warm to warm, left, warm to cool, right  Neuro: Epicritic sensation intact to the level of digits, B/L.  Musculoskeletal/Ortho: unremarkable  Skin: 9/15 s/p bedside Left foot incision and drainage: veraflow VAC applied. Normal cap refill for toes 1, 3-5 <3 seconds, no ischemic changes. Right foot no open wounds, no signs of infection.    RADIOLOGY & ADDITIONAL TESTS:

## 2024-09-20 DIAGNOSIS — A48.0 GAS GANGRENE: ICD-10-CM

## 2024-09-20 DIAGNOSIS — M86.9 OSTEOMYELITIS, UNSPECIFIED: ICD-10-CM

## 2024-09-20 LAB
ANION GAP SERPL CALC-SCNC: 14 MMOL/L — SIGNIFICANT CHANGE UP (ref 7–14)
BUN SERPL-MCNC: 15 MG/DL — SIGNIFICANT CHANGE UP (ref 7–23)
CALCIUM SERPL-MCNC: 9.3 MG/DL — SIGNIFICANT CHANGE UP (ref 8.4–10.5)
CHLORIDE SERPL-SCNC: 94 MMOL/L — LOW (ref 98–107)
CO2 SERPL-SCNC: 22 MMOL/L — SIGNIFICANT CHANGE UP (ref 22–31)
CREAT SERPL-MCNC: 1.06 MG/DL — SIGNIFICANT CHANGE UP (ref 0.5–1.3)
EGFR: 80 ML/MIN/1.73M2 — SIGNIFICANT CHANGE UP
GLUCOSE BLDC GLUCOMTR-MCNC: 239 MG/DL — HIGH (ref 70–99)
GLUCOSE BLDC GLUCOMTR-MCNC: 251 MG/DL — HIGH (ref 70–99)
GLUCOSE BLDC GLUCOMTR-MCNC: 297 MG/DL — HIGH (ref 70–99)
GLUCOSE BLDC GLUCOMTR-MCNC: 300 MG/DL — HIGH (ref 70–99)
GLUCOSE SERPL-MCNC: 247 MG/DL — HIGH (ref 70–99)
HCT VFR BLD CALC: 37.9 % — LOW (ref 39–50)
HGB BLD-MCNC: 12.4 G/DL — LOW (ref 13–17)
MCHC RBC-ENTMCNC: 28.1 PG — SIGNIFICANT CHANGE UP (ref 27–34)
MCHC RBC-ENTMCNC: 32.7 GM/DL — SIGNIFICANT CHANGE UP (ref 32–36)
MCV RBC AUTO: 85.9 FL — SIGNIFICANT CHANGE UP (ref 80–100)
NRBC # BLD: 0 /100 WBCS — SIGNIFICANT CHANGE UP (ref 0–0)
NRBC # FLD: 0 K/UL — SIGNIFICANT CHANGE UP (ref 0–0)
PLATELET # BLD AUTO: 508 K/UL — HIGH (ref 150–400)
POTASSIUM SERPL-MCNC: 4.7 MMOL/L — SIGNIFICANT CHANGE UP (ref 3.5–5.3)
POTASSIUM SERPL-SCNC: 4.7 MMOL/L — SIGNIFICANT CHANGE UP (ref 3.5–5.3)
RBC # BLD: 4.41 M/UL — SIGNIFICANT CHANGE UP (ref 4.2–5.8)
RBC # FLD: 12.7 % — SIGNIFICANT CHANGE UP (ref 10.3–14.5)
SODIUM SERPL-SCNC: 130 MMOL/L — LOW (ref 135–145)
WBC # BLD: 16.25 K/UL — HIGH (ref 3.8–10.5)
WBC # FLD AUTO: 16.25 K/UL — HIGH (ref 3.8–10.5)

## 2024-09-20 PROCEDURE — 88311 DECALCIFY TISSUE: CPT | Mod: 26

## 2024-09-20 PROCEDURE — 99232 SBSQ HOSP IP/OBS MODERATE 35: CPT

## 2024-09-20 PROCEDURE — 88305 TISSUE EXAM BY PATHOLOGIST: CPT | Mod: 26

## 2024-09-20 RX ORDER — LIDOCAINE HCL 20 MG/ML
20 AMPUL (ML) INJECTION ONCE
Refills: 0 | Status: DISCONTINUED | OUTPATIENT
Start: 2024-09-20 | End: 2024-09-27

## 2024-09-20 RX ADMIN — ENOXAPARIN SODIUM 40 MILLIGRAM(S): 150 INJECTION SUBCUTANEOUS at 12:39

## 2024-09-20 RX ADMIN — Medication 1: at 21:37

## 2024-09-20 RX ADMIN — Medication 650 MILLIGRAM(S): at 21:25

## 2024-09-20 RX ADMIN — Medication 81 MILLIGRAM(S): at 12:37

## 2024-09-20 RX ADMIN — Medication 3 UNIT(S): at 17:11

## 2024-09-20 RX ADMIN — PIPERACILLIN SODIUM AND TAZOBACTAM SODIUM 25 GRAM(S): 12; 1.5 INJECTION, POWDER, LYOPHILIZED, FOR SOLUTION INTRAVENOUS at 21:36

## 2024-09-20 RX ADMIN — INSULIN GLARGINE 25 UNIT(S): 300 INJECTION, SOLUTION SUBCUTANEOUS at 08:32

## 2024-09-20 RX ADMIN — PIPERACILLIN SODIUM AND TAZOBACTAM SODIUM 25 GRAM(S): 12; 1.5 INJECTION, POWDER, LYOPHILIZED, FOR SOLUTION INTRAVENOUS at 05:37

## 2024-09-20 RX ADMIN — Medication 3 UNIT(S): at 12:34

## 2024-09-20 RX ADMIN — TRAMADOL HYDROCHLORIDE 50 MILLIGRAM(S): 50 TABLET, COATED ORAL at 18:45

## 2024-09-20 RX ADMIN — Medication 650 MILLIGRAM(S): at 20:25

## 2024-09-20 RX ADMIN — Medication 2: at 17:12

## 2024-09-20 RX ADMIN — PIPERACILLIN SODIUM AND TAZOBACTAM SODIUM 25 GRAM(S): 12; 1.5 INJECTION, POWDER, LYOPHILIZED, FOR SOLUTION INTRAVENOUS at 15:03

## 2024-09-20 RX ADMIN — ATORVASTATIN CALCIUM 80 MILLIGRAM(S): 10 TABLET, FILM COATED ORAL at 21:38

## 2024-09-20 RX ADMIN — Medication 3 UNIT(S): at 08:33

## 2024-09-20 RX ADMIN — TRAMADOL HYDROCHLORIDE 50 MILLIGRAM(S): 50 TABLET, COATED ORAL at 19:45

## 2024-09-20 RX ADMIN — Medication 3: at 08:33

## 2024-09-20 RX ADMIN — Medication 3: at 12:34

## 2024-09-20 NOTE — ADVANCED PRACTICE NURSE CONSULT - REASON FOR CONSULT
Patient seen at bedside for NPWT VAC veraflo application. Patient is s/p bedside debridement on 9/20 with Podiatry. Upon removal of default dressing to left foot; moderate sanguinous drainage noted from incisional site. Kaltostat and pressure applied at bedside and Podiatry notified hemostasis unable to be achieved with kaltostat and pressure; Podiatry at bedside applying Surgicel and pressure dressing at this time. Recommending re-evaluation on Monday for potential NPWT VAC application.     Once hemostasis is achieved; recommending alternative dressing as: Cleanse with NS, pat dry. Apply Liquid barrier film to periwound skin (allow to dry). Apply collagenase (Santyl), nickel-coin thickness, to entire base of wound. Apply Dakins 1/4 strength moistened gauze to base of wound and secure with kerlix. Change twice a day and PRN if soiled.     Plan of care discussed with patient and Podiatry team - Svitlana Dean (DPM).  Please contact Wound/Ostomy Care Service Line if we can be of further assistance (ext [phone]).  Patient seen at bedside for NPWT VAC veraflo application. Patient is s/p bedside debridement on 9/20 with Podiatry. Upon removal of default dressing to left foot; moderate sanguinous drainage noted from incisional site. Kaltostat and pressure applied at bedside and Podiatry notified hemostasis unable to be achieved with kaltostat and pressure; Podiatry at bedside applying Surgicel and pressure dressing at this time. Recommending re-evaluation on Monday for potential NPWT VAC application.     Once hemostasis is achieved; recommending alternative dressing as: Cleanse with NS, pat dry. Apply Liquid barrier film to periwound skin (allow to dry). Apply collagenase (Santyl), nickel-coin thickness, to entire base of wound then apply Dakins 1/4 strength moistened gauze and secure with kerlix. Change twice a day and PRN if soiled.     Plan of care discussed with patient and Podiatry team - Svitlana Dean (DPM).  Please contact Wound/Ostomy Care Service Line if we can be of further assistance (ext [phone]).

## 2024-09-20 NOTE — CHART NOTE - NSCHARTNOTEFT_GEN_A_CORE
Pt was seen bedside.   After obtaining written consent, 10cc 1% lidocaine plain was administered in a v block fashion to the left foot 1st interspace.   Excisional left foot wound debridement down to and including subQ but not beyond performed with sterile 10 blade.  Pt left foot was then packed with iodoform plain and dressed with 4x4 gauze, ABD pad, margaret.  Pt tolerated the procedure well.
ID consulted, will f/u recs     Amy Hagan, BALAJI pager [phone]
Please hold off Veraflow VAC application until afternoon. Will be debriding non-viable tissue. Okay to be applied after.
Pt was seen and evaluated  - Paged by wound care team to evaluate bleeding at left foot disarticulation wound site  - Bleeding was controlled by application of Surgicel followed a compressive dressing (4x4 gauze, ABD, margaret, kerlix, and ACE)  - Pt will be re-evaluated tomorrow morning

## 2024-09-20 NOTE — PROGRESS NOTE ADULT - ASSESSMENT
59M s/p bedside Left foot incision and drainage (DOS 9/15)   - Pt was seen and evaluated  - Afebrile, labs pending  - 9/15 s/p bedside Left foot incision and drainage: veraflow VAC applied. Normal cap refill for toes 1, 3-5 <3 seconds, no ischemic changes. Right foot no open wounds, no signs of infection.  - Left foot X-ray: 1st interspace soft tissue emphysema, no OM   - Left foot MRI: OM of the distal phalanx of the hallux  - Left foot wound culture: growing >4 types of aerobics and/or anaerobic bacteria.  - ID recs appreciated   - Pt refusing surgical intervention at this time.  - Veraflow VAC okay to be applied today  - Pod plan local wound care with veraflowVAC pending appearance   - Please document medial clearance for possible podiatric surgery under anesthesia  - Discussed with attending     Patient left AMA from ED worsening severe infection without I&D originally performed (only FB removal), refusing surgery at this time however cautioned patient that he is likely to lose the first and second toes/rays as well as possibly the forefoot as a result. Aggressive bedside debridement performed in an attempt to control infection will plan on VAC to assist veraflow however viability of the foot remains guarded and the patient will require extensive wound care following hospitalization. Patient shows verbal awareness. Pending labs continuing to downtrend as well as likely PICC long term IV abx to be arranged.   59M s/p bedside Left foot incision and drainage (DOS 9/15)   - Pt was seen and evaluated  - Afebrile, labs pending  - 9/15 s/p bedside Left foot incision and drainage: veraflow VAC applied. Normal cap refill for toes 1, 3-5 <3 seconds, no ischemic changes. Right foot no open wounds, no signs of infection.  - Left foot X-ray: 1st interspace soft tissue emphysema, no OM   - Left foot MRI: OM of the distal phalanx of the hallux  - Left foot wound culture: growing >4 types of aerobics and/or anaerobic bacteria.  - ID recs appreciated   - Pt refusing surgical intervention at this time.  - Please hold off Veraflow VAC application until afternoon. Will be debriding non-viable tissue. Okay to be applied after.   - Pod plan local wound care with veraflowVAC pending appearance   - Please document medial clearance for possible podiatric surgery under anesthesia  - Discussed with attending     Patient left AMA from ED worsening severe infection without I&D originally performed (only FB removal), refusing surgery at this time however cautioned patient that he is likely to lose the first and second toes/rays as well as possibly the forefoot as a result. Aggressive bedside debridement performed in an attempt to control infection will plan on VAC to assist veraflow however viability of the foot remains guarded and the patient will require extensive wound care following hospitalization. Patient shows verbal awareness. Pending labs continuing to downtrend as well as likely PICC long term IV abx to be arranged.

## 2024-09-20 NOTE — PROCEDURE NOTE - NSINDICATIONS_GEN_A_CORE
bone/devitalized or nonviable tissue at the level of:/prepare site for appropriate surgical interventions to optimize wound healing

## 2024-09-20 NOTE — PROCEDURE NOTE - PROBLEM SELECTOR PROBLEM 2
Occupational Therapy Treatment Note   Date: 3/14/2024  Name: Elizabet Waddell  Clinic Number: [id number]  Age: 15 m.o.    Physician: Ayah Cramer MD  Physician Orders: Evaluate and Treat  Medical Diagnosis: R63.3, R63.39, Z93.1    Therapy Diagnosis:   Encounter Diagnoses   Name Primary?    Feeding difficulties Yes    Oral aversion     G tube feedings       Evaluation Date: 02/07/2024  Plan of Care Certification Period: 02/07/2024 - 05/07/2024    Time In: 1430  Time Out: 1500  Total Billable Time: 30 minutes    Precautions:  Standard.   Subjective     Mother brought Elizabet to therapy and remained in waiting room during treatment session. She was connected to her g-tube feeding throughout the session.        Pain: Child too young to understand and rate pain levels. No pain behaviors noted during session.  Objective     Patient participated in therapeutic activities to improve functional performance for  30  minutes, including:   Oral motor  Feeding  Gross motor  Postural stability  Home program     Home Exercises and Education Provided     Education provided:   - Caregiver educated on current performance and POC. Caregiver verbalized understanding.    Home Exercises Provided: No. Exercises to be provided in subsequent treatment sessions       Assessment     Patient with good tolerance to session with mod cues for regulation and redirection. Elizabet Granger transitioned to OT from grandfather without challenge. Elizabet Granger was a bit fussy at the start but found link in bubble activity. Therapist working with body position and reaching, targeting neck and trunk range, along with postural stability. Most of her movement attempts was slow and laborious and was quickly knocked off balance with the slightest challenge out of midline. She participated with transitional movement from sitting to quadruped with fair- motor planning and poor joint stability. She sustained with posterior weight shift and challenged weight bearing  moving forward. She participated in novel toy play targeting play expansion and proximal joint stability. She required moderate assistance for accuracy of placing item in small opening container. She did display more moments of open mouth vocal play throughout session, also with laugher. She later presented with pursed lips again, following her initiation of creeping in quadruped. She appeared to be challenged with reflux. She transitioned to grandfather without difficulty. Elizabet Granger is progressing well towards her goals and there are no updates to goals at this time. Patient will continue to benefit from skilled outpatient occupational therapy to address the deficits listed in the problem list on initial evaluation to maximize patient's potential level of independence and progress toward age appropriate skills.    Patient prognosis is Excellent.  Anticipated barriers to occupational therapy: none at this time  Patient's spiritual, cultural and educational needs considered and agreeable to plan of care and goals.    Goals:  Long Term Goals  Elizabet Granger will display improved oral motor skills for safety and independence with oral feeds at home and within the community.  Elizabet Granger will display improved fine motor skills for age appropriate participation in self-feeding at home and within the community.  Elizabet Granger will display improved joint and core stability and strength for age appropriate participation in play and motor activities at home and within the community.     Short Term Goals  Parents will be compliant and independent with all provided home activities/exercises provided throughout treatment time.  Elizabet Granger will accept tactile input to face 100% of the time in order to improve acceptance in preparation of oral motor exercises.   Elizabet Granger will accept tactile input to mouth with therapist's gloved finger in order to improve acceptance in preparation of oral motor exercises. On Hold  Elizabet Granger will be  able to roll back to belly with moderate assistance 90% of the time.  Elizabet Granger will maintain prone play, with weight support on forearms, for 3 minutes with minimal support 90% of the time.  Elizabet Granger will grasp small item with fingertip and thumb with moderate support 90% of the time.    Plan   Updates/grading for next session: build tolerance for oral motor; gross motor milestones    Deya Brandt, AVIS, LOTR  3/14/2024      Gas gangrene

## 2024-09-20 NOTE — PROCEDURE NOTE - NSICDXPROBLEMMLMBOX_GEN
IPSTART~^~1~^~[id number]~^~~^~IPEND  PKSTART~^~[id number]~^~PKEND  IPSTART~^~2~^~[id number]~^~~^~IPEND  PKSTART~^~[id number]~^~PKEND

## 2024-09-20 NOTE — PROGRESS NOTE ADULT - SUBJECTIVE AND OBJECTIVE BOX
SUBJECTIVE / OVERNIGHT EVENTS:pt seen and examined  09-20-24     MEDICATIONS  (STANDING):  aspirin enteric coated 81 milliGRAM(s) Oral daily  atorvastatin 80 milliGRAM(s) Oral at bedtime  Dakins Solution - 1/4 Strength 1 Application(s) Topical daily  dextrose 5%. 1000 milliLiter(s) (50 mL/Hr) IV Continuous <Continuous>  dextrose 5%. 1000 milliLiter(s) (100 mL/Hr) IV Continuous <Continuous>  dextrose 50% Injectable 25 Gram(s) IV Push once  dextrose 50% Injectable 25 Gram(s) IV Push once  dextrose 50% Injectable 12.5 Gram(s) IV Push once  enoxaparin Injectable 40 milliGRAM(s) SubCutaneous every 24 hours  glucagon  Injectable 1 milliGRAM(s) IntraMuscular once  influenza   Vaccine 0.5 milliLiter(s) IntraMuscular once  insulin glargine Injectable (LANTUS) 25 Unit(s) SubCutaneous every morning  insulin lispro (ADMELOG) corrective regimen sliding scale   SubCutaneous three times a day before meals  insulin lispro (ADMELOG) corrective regimen sliding scale   SubCutaneous at bedtime  insulin lispro Injectable (ADMELOG) 3 Unit(s) SubCutaneous three times a day before meals  piperacillin/tazobactam IVPB.. 3.375 Gram(s) IV Intermittent every 8 hours    MEDICATIONS  (PRN):  acetaminophen     Tablet .. 650 milliGRAM(s) Oral every 6 hours PRN Temp greater or equal to 38C (100.4F), Mild Pain (1 - 3)  dextrose Oral Gel 15 Gram(s) Oral once PRN Blood Glucose LESS THAN 70 milliGRAM(s)/deciliter  traMADol 25 milliGRAM(s) Oral every 6 hours PRN Moderate Pain (4 - 6)  traMADol 50 milliGRAM(s) Oral every 6 hours PRN Severe Pain (7 - 10)    Vital Signs Last 24 Hrs  T(C): 36.8 (09-19-24 @ 21:04), Max: 36.9 (09-19-24 @ 17:31)  T(F): 98.3 (09-19-24 @ 21:04), Max: 98.4 (09-19-24 @ 17:31)  HR: 81 (09-19-24 @ 21:04) (81 - 100)  BP: 146/67 (09-19-24 @ 21:04) (128/73 - 151/69)  BP(mean): --  RR: 17 (09-19-24 @ 21:04) (16 - 17)  SpO2: 100% (09-19-24 @ 21:04) (96% - 100%)        Constitutional: No fever, fatigue  Skin: No rash.  Eyes: No recent vision problems or eye pain.  ENT: No congestion, ear pain, or sore throat.  Cardiovascular: No chest pain or palpation.  Respiratory: No cough, shortness of breath, congestion, or wheezing.  Gastrointestinal: No abdominal pain, nausea, vomiting, or diarrhea.  Genitourinary: No dysuria.  Musculoskeletal: No joint swelling.  Neurologic: No headache.    PHYSICAL EXAM:  GENERAL: NAD  EYES: EOMI, PERRLA  NECK: Supple, No JVD  CHEST/LUNG: cta jai  HEART:  S1 , S2 +  ABDOMEN: soft , bs+  EXTREMITIES:  left foot dressing +  NEUROLOGY:alert awake oriented       LABS:  09-19    130[L]  |  94[L]  |  11  ----------------------------<  340[H]  4.9   |  24  |  0.92    Ca    9.0      19 Sep 2024 12:31  Phos  2.8     09-18  Mg     2.50     09-18      Creatinine Trend: 0.92 <--, 0.92 <--, 0.86 <--, 0.88 <--, 0.95 <--                        12.7   17.57 )-----------( 477      ( 19 Sep 2024 12:31 )             38.2     Urine Studies:  Urinalysis Basic - ( 19 Sep 2024 12:31 )    Color:  / Appearance:  / SG:  / pH:   Gluc: 340 mg/dL / Ketone:   / Bili:  / Urobili:    Blood:  / Protein:  / Nitrite:    Leuk Esterase:  / RBC:  / WBC    Sq Epi:  / Non Sq Epi:  / Bacteria:                                     RADIOLOGY & ADDITIONAL TESTS:    Imaging Personally Reviewed:    Consultant(s) Notes Reviewed:      Care Discussed with Consultants/Other Providers:

## 2024-09-20 NOTE — PROCEDURE NOTE - ADDITIONAL PROCEDURE DETAILS
Left foot hallux disarticulation with wound debridement under local 1% lidocaine local anesthetic    Specimen: Pathology - left hallux

## 2024-09-20 NOTE — PROGRESS NOTE ADULT - ASSESSMENT
This is a 58 y/o M w/ PMHx of DM2, CVA w/ LLE residual WA presenting for L foot wound, seen initially on 9/12, findings of foreign body at that time w/ soft tissue gas, s/p I&D with removal of foreign. Pt then left AMA, sent out on Augmentin, however now returning to the ER with new lesion on dorsum, enlarging w/ bloody drainage.   Pt afebrile, WBC 21 (was 27 on 9/12).  XR L foot w/ mottled air collection in 1st web soft tissues, no OM.  Now s/p bedside I&D w/ podiatry within 1st interspace, then plantar 2nd digit w/ purulence.    #L distal hallux OM, refusing surgical intervention at this time    #L foot 1st interspace soft tissue infection   #Leukocytosis     Plan:   1. C/w Zosyn 3.375 g q8 via extended infusion    2. F/u BCx, wound Cx, Aeromonas, Porteus vulgaris, Kleb pneumoniae, staph lugdunensis    3. No plans for surgery at this time, bedside debridement w/ podiatry, s/p VAC  4. When ready for discharge, pt can be sent on Augmentin/Ciprofloxacin for total 6 week course       Thank you for this consult. Inpatient ID team will follow.    Michel Aburto M.D.  Attending Physician  Division of Infectious Diseases  Department of Medicine    Please contact through MS Teams message.  Office: [phone] (after 5 PM or weekend).

## 2024-09-20 NOTE — PROGRESS NOTE ADULT - SUBJECTIVE AND OBJECTIVE BOX
Patient is a 59y old  Male who presents with a chief complaint of Left foot wound infection (19 Sep 2024 16:08)       INTERVAL HPI/OVERNIGHT EVENTS:  Patient seen and evaluated at bedside.  Pt is resting comfortable in NAD. Denies N/V/F/C.      Allergies    No Known Allergies    Intolerances        Vital Signs Last 24 Hrs  T(C): 36.8 (20 Sep 2024 09:50), Max: 37 (20 Sep 2024 01:02)  T(F): 98.2 (20 Sep 2024 09:50), Max: 98.6 (20 Sep 2024 01:02)  HR: 82 (20 Sep 2024 09:50) (81 - 100)  BP: 139/62 (20 Sep 2024 09:50) (125/54 - 151/69)  BP(mean): --  RR: 18 (20 Sep 2024 09:50) (16 - 18)  SpO2: 98% (20 Sep 2024 09:50) (97% - 100%)    Parameters below as of 20 Sep 2024 09:50  Patient On (Oxygen Delivery Method): room air        LABS:                        12.4   16.25 )-----------( 508      ( 20 Sep 2024 04:40 )             37.9     09-20    130[L]  |  94[L]  |  15  ----------------------------<  247[H]  4.7   |  22  |  1.06    Ca    9.3      20 Sep 2024 04:40        Urinalysis Basic - ( 20 Sep 2024 04:40 )    Color: x / Appearance: x / SG: x / pH: x  Gluc: 247 mg/dL / Ketone: x  / Bili: x / Urobili: x   Blood: x / Protein: x / Nitrite: x   Leuk Esterase: x / RBC: x / WBC x   Sq Epi: x / Non Sq Epi: x / Bacteria: x      CAPILLARY BLOOD GLUCOSE      POCT Blood Glucose.: 251 mg/dL (20 Sep 2024 08:16)  POCT Blood Glucose.: 226 mg/dL (19 Sep 2024 21:04)  POCT Blood Glucose.: 222 mg/dL (19 Sep 2024 16:45)  POCT Blood Glucose.: 295 mg/dL (19 Sep 2024 12:26)      Lower Extremity Physical Exam:  Vascular: DP/PT 1/4, B/L, CFT <3 seconds B/L, Temperature gradient warm to warm, left, warm to cool, right  Neuro: Epicritic sensation intact to the level of digits, B/L.  Musculoskeletal/Ortho: unremarkable  Skin: 9/15 s/p bedside Left foot incision and drainage: veraflow VAC applied. Normal cap refill for toes 1, 3-5 <3 seconds, no ischemic changes. Right foot no open wounds, no signs of infection.      RADIOLOGY & ADDITIONAL TESTS:

## 2024-09-20 NOTE — PROGRESS NOTE ADULT - SUBJECTIVE AND OBJECTIVE BOX
Infectious Diseases Follow Up:    Patient is a 59y old  Male who presents with a chief complaint of Left foot wound infection (20 Sep 2024 10:41)      Interval History/ROS:  No acute complaints     Allergies  No Known Allergies        ANTIMICROBIALS:  piperacillin/tazobactam IVPB.. 3.375 every 8 hours      Current Abx:     Previous Abx     OTHER MEDS:  MEDICATIONS  (STANDING):  acetaminophen     Tablet .. 650 every 6 hours PRN  aspirin enteric coated 81 daily  atorvastatin 80 at bedtime  dextrose 50% Injectable 25 once  dextrose 50% Injectable 12.5 once  dextrose 50% Injectable 25 once  dextrose Oral Gel 15 once PRN  enoxaparin Injectable 40 every 24 hours  glucagon  Injectable 1 once  influenza   Vaccine 0.5 once  insulin glargine Injectable (LANTUS) 25 every morning  insulin lispro (ADMELOG) corrective regimen sliding scale  at bedtime  insulin lispro (ADMELOG) corrective regimen sliding scale  three times a day before meals  insulin lispro Injectable (ADMELOG) 3 three times a day before meals  traMADol 25 every 6 hours PRN  traMADol 50 every 6 hours PRN      Vital Signs Last 24 Hrs  T(C): 36.8 (20 Sep 2024 09:50), Max: 37 (20 Sep 2024 01:02)  T(F): 98.2 (20 Sep 2024 09:50), Max: 98.6 (20 Sep 2024 01:02)  HR: 82 (20 Sep 2024 09:50) (81 - 100)  BP: 139/62 (20 Sep 2024 09:50) (125/54 - 151/69)  BP(mean): --  RR: 18 (20 Sep 2024 09:50) (16 - 18)  SpO2: 98% (20 Sep 2024 09:50) (97% - 100%)    Parameters below as of 20 Sep 2024 09:50  Patient On (Oxygen Delivery Method): room air        PHYSICAL EXAM:  GENERAL: NAD, well-developed  HEAD:  Atraumatic, Normocephalic  CHEST/LUNG: On RA, not in respiratory distress   EXTREMITIES:  L foot wrapped  PSYCH: AAOx3                        12.4   16.25 )-----------( 508      ( 20 Sep 2024 04:40 )             37.9       09-20    130[L]  |  94[L]  |  15  ----------------------------<  247[H]  4.7   |  22  |  1.06    Ca    9.3      20 Sep 2024 04:40        Urinalysis Basic - ( 20 Sep 2024 04:40 )    Color: x / Appearance: x / SG: x / pH: x  Gluc: 247 mg/dL / Ketone: x  / Bili: x / Urobili: x   Blood: x / Protein: x / Nitrite: x   Leuk Esterase: x / RBC: x / WBC x   Sq Epi: x / Non Sq Epi: x / Bacteria: x        MICROBIOLOGY:  v  .Abscess left foot  09-15-24   Culture yields >4 types of aerobic and/or anaerobic bacteria  Call client services within 7 days if further workup is clinically  indicated. including  Moderate Aeromonas hydrophila/caviae complex  Moderate Proteus vulgaris group  Few Staphylococcus epidermidis "Susceptibilities not performed"  Moderate Klebsiella pneumoniae  Few Staphylococcus lugdunensis  Unable to evaluate further due to Proteus overgrowth  --  Proteus vulgaris group  Aeromonas hydrophila/caviae complex  Klebsiella pneumoniae  Staphylococcus lugdunensis      .Blood Blood-Peripheral  09-15-24   No growth at 4 days  --  --      .Blood Blood-Peripheral  09-11-24   No growth at 5 days  --  --                RADIOLOGY:

## 2024-09-21 LAB
ALBUMIN SERPL ELPH-MCNC: 3.1 G/DL — LOW (ref 3.3–5)
ALP SERPL-CCNC: 64 U/L — SIGNIFICANT CHANGE UP (ref 40–120)
ALT FLD-CCNC: 85 U/L — HIGH (ref 4–41)
ANION GAP SERPL CALC-SCNC: 14 MMOL/L — SIGNIFICANT CHANGE UP (ref 7–14)
AST SERPL-CCNC: 31 U/L — SIGNIFICANT CHANGE UP (ref 4–40)
BASOPHILS # BLD AUTO: 0.04 K/UL — SIGNIFICANT CHANGE UP (ref 0–0.2)
BASOPHILS NFR BLD AUTO: 0.2 % — SIGNIFICANT CHANGE UP (ref 0–2)
BILIRUB SERPL-MCNC: 0.4 MG/DL — SIGNIFICANT CHANGE UP (ref 0.2–1.2)
BUN SERPL-MCNC: 16 MG/DL — SIGNIFICANT CHANGE UP (ref 7–23)
CALCIUM SERPL-MCNC: 9.3 MG/DL — SIGNIFICANT CHANGE UP (ref 8.4–10.5)
CHLORIDE SERPL-SCNC: 94 MMOL/L — LOW (ref 98–107)
CO2 SERPL-SCNC: 22 MMOL/L — SIGNIFICANT CHANGE UP (ref 22–31)
CREAT SERPL-MCNC: 0.9 MG/DL — SIGNIFICANT CHANGE UP (ref 0.5–1.3)
CULTURE RESULTS: ABNORMAL
CULTURE RESULTS: SIGNIFICANT CHANGE UP
CULTURE RESULTS: SIGNIFICANT CHANGE UP
EGFR: 98 ML/MIN/1.73M2 — SIGNIFICANT CHANGE UP
EOSINOPHIL # BLD AUTO: 0.16 K/UL — SIGNIFICANT CHANGE UP (ref 0–0.5)
EOSINOPHIL NFR BLD AUTO: 0.9 % — SIGNIFICANT CHANGE UP (ref 0–6)
GLUCOSE BLDC GLUCOMTR-MCNC: 227 MG/DL — HIGH (ref 70–99)
GLUCOSE BLDC GLUCOMTR-MCNC: 246 MG/DL — HIGH (ref 70–99)
GLUCOSE BLDC GLUCOMTR-MCNC: 260 MG/DL — HIGH (ref 70–99)
GLUCOSE BLDC GLUCOMTR-MCNC: 277 MG/DL — HIGH (ref 70–99)
GLUCOSE SERPL-MCNC: 310 MG/DL — HIGH (ref 70–99)
HCT VFR BLD CALC: 38.1 % — LOW (ref 39–50)
HGB BLD-MCNC: 12.5 G/DL — LOW (ref 13–17)
IANC: 13.85 K/UL — HIGH (ref 1.8–7.4)
IMM GRANULOCYTES NFR BLD AUTO: 0.9 % — SIGNIFICANT CHANGE UP (ref 0–0.9)
LYMPHOCYTES # BLD AUTO: 1.63 K/UL — SIGNIFICANT CHANGE UP (ref 1–3.3)
LYMPHOCYTES # BLD AUTO: 9.6 % — LOW (ref 13–44)
MCHC RBC-ENTMCNC: 27.8 PG — SIGNIFICANT CHANGE UP (ref 27–34)
MCHC RBC-ENTMCNC: 32.8 GM/DL — SIGNIFICANT CHANGE UP (ref 32–36)
MCV RBC AUTO: 84.9 FL — SIGNIFICANT CHANGE UP (ref 80–100)
MONOCYTES # BLD AUTO: 1.06 K/UL — HIGH (ref 0–0.9)
MONOCYTES NFR BLD AUTO: 6.3 % — SIGNIFICANT CHANGE UP (ref 2–14)
NEUTROPHILS # BLD AUTO: 13.85 K/UL — HIGH (ref 1.8–7.4)
NEUTROPHILS NFR BLD AUTO: 82.1 % — HIGH (ref 43–77)
NRBC # BLD: 0 /100 WBCS — SIGNIFICANT CHANGE UP (ref 0–0)
NRBC # FLD: 0 K/UL — SIGNIFICANT CHANGE UP (ref 0–0)
ORGANISM # SPEC MICROSCOPIC CNT: ABNORMAL
PLATELET # BLD AUTO: 499 K/UL — HIGH (ref 150–400)
POTASSIUM SERPL-MCNC: 4.8 MMOL/L — SIGNIFICANT CHANGE UP (ref 3.5–5.3)
POTASSIUM SERPL-SCNC: 4.8 MMOL/L — SIGNIFICANT CHANGE UP (ref 3.5–5.3)
PROT SERPL-MCNC: 8.1 G/DL — SIGNIFICANT CHANGE UP (ref 6–8.3)
RBC # BLD: 4.49 M/UL — SIGNIFICANT CHANGE UP (ref 4.2–5.8)
RBC # FLD: 12.6 % — SIGNIFICANT CHANGE UP (ref 10.3–14.5)
SODIUM SERPL-SCNC: 130 MMOL/L — LOW (ref 135–145)
SPECIMEN SOURCE: SIGNIFICANT CHANGE UP
WBC # BLD: 16.9 K/UL — HIGH (ref 3.8–10.5)
WBC # FLD AUTO: 16.9 K/UL — HIGH (ref 3.8–10.5)

## 2024-09-21 RX ORDER — TRAMADOL HYDROCHLORIDE 50 MG/1
25 TABLET, COATED ORAL ONCE
Refills: 0 | Status: DISCONTINUED | OUTPATIENT
Start: 2024-09-21 | End: 2024-09-21

## 2024-09-21 RX ADMIN — Medication 3: at 12:12

## 2024-09-21 RX ADMIN — Medication 3: at 17:31

## 2024-09-21 RX ADMIN — PIPERACILLIN SODIUM AND TAZOBACTAM SODIUM 25 GRAM(S): 12; 1.5 INJECTION, POWDER, LYOPHILIZED, FOR SOLUTION INTRAVENOUS at 13:27

## 2024-09-21 RX ADMIN — INSULIN GLARGINE 25 UNIT(S): 300 INJECTION, SOLUTION SUBCUTANEOUS at 08:10

## 2024-09-21 RX ADMIN — TRAMADOL HYDROCHLORIDE 50 MILLIGRAM(S): 50 TABLET, COATED ORAL at 19:32

## 2024-09-21 RX ADMIN — Medication 81 MILLIGRAM(S): at 12:12

## 2024-09-21 RX ADMIN — Medication 650 MILLIGRAM(S): at 09:05

## 2024-09-21 RX ADMIN — Medication 3 UNIT(S): at 12:13

## 2024-09-21 RX ADMIN — Medication 1 APPLICATION(S): at 17:35

## 2024-09-21 RX ADMIN — ATORVASTATIN CALCIUM 80 MILLIGRAM(S): 10 TABLET, FILM COATED ORAL at 23:16

## 2024-09-21 RX ADMIN — Medication 650 MILLIGRAM(S): at 08:16

## 2024-09-21 RX ADMIN — PIPERACILLIN SODIUM AND TAZOBACTAM SODIUM 25 GRAM(S): 12; 1.5 INJECTION, POWDER, LYOPHILIZED, FOR SOLUTION INTRAVENOUS at 23:04

## 2024-09-21 RX ADMIN — TRAMADOL HYDROCHLORIDE 50 MILLIGRAM(S): 50 TABLET, COATED ORAL at 00:28

## 2024-09-21 RX ADMIN — TRAMADOL HYDROCHLORIDE 50 MILLIGRAM(S): 50 TABLET, COATED ORAL at 05:39

## 2024-09-21 RX ADMIN — Medication 3 UNIT(S): at 08:09

## 2024-09-21 RX ADMIN — TRAMADOL HYDROCHLORIDE 25 MILLIGRAM(S): 50 TABLET, COATED ORAL at 23:15

## 2024-09-21 RX ADMIN — TRAMADOL HYDROCHLORIDE 50 MILLIGRAM(S): 50 TABLET, COATED ORAL at 06:34

## 2024-09-21 RX ADMIN — Medication 3 UNIT(S): at 17:33

## 2024-09-21 RX ADMIN — PIPERACILLIN SODIUM AND TAZOBACTAM SODIUM 25 GRAM(S): 12; 1.5 INJECTION, POWDER, LYOPHILIZED, FOR SOLUTION INTRAVENOUS at 05:38

## 2024-09-21 RX ADMIN — TRAMADOL HYDROCHLORIDE 50 MILLIGRAM(S): 50 TABLET, COATED ORAL at 20:15

## 2024-09-21 RX ADMIN — Medication 2: at 08:08

## 2024-09-21 RX ADMIN — ENOXAPARIN SODIUM 40 MILLIGRAM(S): 150 INJECTION SUBCUTANEOUS at 12:12

## 2024-09-21 NOTE — PROGRESS NOTE ADULT - SUBJECTIVE AND OBJECTIVE BOX
Patient is a 59y old  Male who presents with a chief complaint of Left foot wound infection (20 Sep 2024 11:05)       INTERVAL HPI/OVERNIGHT EVENTS:  Patient seen and evaluated at bedside.  Pt is resting comfortable in NAD. Denies N/V/F/C.    Allergies    No Known Allergies    Intolerances        Vital Signs Last 24 Hrs  T(C): 37.1 (21 Sep 2024 05:39), Max: 37.1 (21 Sep 2024 05:39)  T(F): 98.7 (21 Sep 2024 05:39), Max: 98.7 (21 Sep 2024 05:39)  HR: 94 (21 Sep 2024 05:39) (79 - 94)  BP: 125/73 (21 Sep 2024 05:39) (125/73 - 158/68)  BP(mean): --  RR: 18 (21 Sep 2024 05:39) (17 - 18)  SpO2: 98% (21 Sep 2024 05:39) (98% - 99%)    Parameters below as of 21 Sep 2024 05:39  Patient On (Oxygen Delivery Method): room air        LABS:                        12.4   16.25 )-----------( 508      ( 20 Sep 2024 04:40 )             37.9     09-20    130[L]  |  94[L]  |  15  ----------------------------<  247[H]  4.7   |  22  |  1.06    Ca    9.3      20 Sep 2024 04:40        Urinalysis Basic - ( 20 Sep 2024 04:40 )    Color: x / Appearance: x / SG: x / pH: x  Gluc: 247 mg/dL / Ketone: x  / Bili: x / Urobili: x   Blood: x / Protein: x / Nitrite: x   Leuk Esterase: x / RBC: x / WBC x   Sq Epi: x / Non Sq Epi: x / Bacteria: x      CAPILLARY BLOOD GLUCOSE      POCT Blood Glucose.: 246 mg/dL (21 Sep 2024 07:43)  POCT Blood Glucose.: 297 mg/dL (20 Sep 2024 20:53)  POCT Blood Glucose.: 239 mg/dL (20 Sep 2024 17:01)  POCT Blood Glucose.: 300 mg/dL (20 Sep 2024 12:17)      Lower Extremity Physical Exam:  Vascular: DP/PT 1/4, B/L, CFT <3 seconds B/L, Temperature gradient warm to warm, left, warm to cool, right  Neuro: Epicritic sensation intact to the level of digits, B/L.  Musculoskeletal/Ortho: unremarkable  Skin: 9/20 s/p bedside left foot hallux disarticulation, open: no purulence, mild malodor, no drainage, bleeding controlled Right foot no open wounds, no signs of infection.    RADIOLOGY & ADDITIONAL TESTS:

## 2024-09-21 NOTE — PROGRESS NOTE ADULT - ASSESSMENT
59M presents for 9/20 s/p bedside left foot hallux disarticulation, open.  - Pt was seen and evaluated  - Afebrile, labs pending  - 9/20 s/p bedside left foot hallux disarticulation, open: no purulence, mild malodor, no drainage, bleeding controlled Right foot no open wounds, no signs of infection.  - Left foot X-ray: 1st interspace soft tissue emphysema, no OM   - Left foot MRI: OM of the distal phalanx of the hallux  - Left foot wound culture: growing >4 types of aerobics and/or anaerobic bacteria.  - ID recs appreciated   - Pt refusing surgical intervention at this time.  - Pod plan local wound care pending appearance   - Please document medial clearance for possible podiatric surgery under anesthesia  - Discussed with attending     Patient left AMA from ED worsening severe infection without I&D originally performed (only FB removal), refusing surgery at this time however cautioned patient that he is likely to lose the first and second toes/rays as well as possibly the forefoot as a result. Aggressive bedside debridement performed in an attempt to control infection will plan on VAC to assist veraflow however viability of the foot remains guarded and the patient will require extensive wound care following hospitalization. Patient shows verbal awareness. Pending labs continuing to downtrend as well as likely PICC long term IV abx to be arranged.

## 2024-09-21 NOTE — PROGRESS NOTE ADULT - SUBJECTIVE AND OBJECTIVE BOX
SUBJECTIVE / OVERNIGHT EVENTS:pt seen and examined  09-21-24     MEDICATIONS  (STANDING):  aspirin enteric coated 81 milliGRAM(s) Oral daily  atorvastatin 80 milliGRAM(s) Oral at bedtime  collagenase Ointment 1 Application(s) Topical daily  Dakins Solution - 1/4 Strength 1 Application(s) Topical daily  dextrose 5%. 1000 milliLiter(s) (50 mL/Hr) IV Continuous <Continuous>  dextrose 5%. 1000 milliLiter(s) (100 mL/Hr) IV Continuous <Continuous>  dextrose 50% Injectable 25 Gram(s) IV Push once  dextrose 50% Injectable 12.5 Gram(s) IV Push once  dextrose 50% Injectable 25 Gram(s) IV Push once  enoxaparin Injectable 40 milliGRAM(s) SubCutaneous every 24 hours  glucagon  Injectable 1 milliGRAM(s) IntraMuscular once  influenza   Vaccine 0.5 milliLiter(s) IntraMuscular once  insulin glargine Injectable (LANTUS) 25 Unit(s) SubCutaneous every morning  insulin lispro (ADMELOG) corrective regimen sliding scale   SubCutaneous at bedtime  insulin lispro (ADMELOG) corrective regimen sliding scale   SubCutaneous three times a day before meals  insulin lispro Injectable (ADMELOG) 3 Unit(s) SubCutaneous three times a day before meals  lidocaine 1% Injectable 20 milliLiter(s) Local Injection once  piperacillin/tazobactam IVPB.. 3.375 Gram(s) IV Intermittent every 8 hours    MEDICATIONS  (PRN):  acetaminophen     Tablet .. 650 milliGRAM(s) Oral every 6 hours PRN Temp greater or equal to 38C (100.4F), Mild Pain (1 - 3)  dextrose Oral Gel 15 Gram(s) Oral once PRN Blood Glucose LESS THAN 70 milliGRAM(s)/deciliter  traMADol 25 milliGRAM(s) Oral every 6 hours PRN Moderate Pain (4 - 6)  traMADol 50 milliGRAM(s) Oral every 6 hours PRN Severe Pain (7 - 10)      Constitutional: No fever, fatigue  Skin: No rash.  Eyes: No recent vision problems or eye pain.  ENT: No congestion, ear pain, or sore throat.  Cardiovascular: No chest pain or palpation.  Respiratory: No cough, shortness of breath, congestion, or wheezing.  Gastrointestinal: No abdominal pain, nausea, vomiting, or diarrhea.  Vital Signs Last 24 Hrs  T(C): 36.9 (09-21-24 @ 23:29), Max: 37.1 (09-21-24 @ 05:39)  T(F): 98.4 (09-21-24 @ 23:29), Max: 98.7 (09-21-24 @ 05:39)  HR: 86 (09-21-24 @ 23:29) (64 - 94)  BP: 144/81 (09-21-24 @ 23:29) (125/73 - 153/68)  BP(mean): --  RR: 17 (09-21-24 @ 23:29) (17 - 18)  SpO2: 99% (09-21-24 @ 23:29) (98% - 99%)    Genitourinary: No dysuria.  Musculoskeletal: No joint swelling.  Neurologic: No headache.    PHYSICAL EXAM:  GENERAL: NAD  EYES: EOMI, PERRLA  NECK: Supple, No JVD  CHEST/LUNG: cta aji  HEART:  S1 , S2 +  ABDOMEN: soft , bs+  EXTREMITIES:  left foot dressing +  NEUROLOGY:alert awake oriented       LABS:  09-21    130[L]  |  94[L]  |  16  ----------------------------<  310[H]  4.8   |  22  |  0.90    Ca    9.3      21 Sep 2024 10:10    TPro  8.1  /  Alb  3.1[L]  /  TBili  0.4  /  DBili      /  AST  31  /  ALT  85[H]  /  AlkPhos  64  09-21    Creatinine Trend: 0.90 <--, 1.06 <--, 0.92 <--, 0.92 <--, 0.86 <--, 0.88 <--, 0.95 <--                        12.5   16.90 )-----------( 499      ( 21 Sep 2024 10:10 )             38.1     Urine Studies:  Urinalysis Basic - ( 21 Sep 2024 10:10 )    Color:  / Appearance:  / SG:  / pH:   Gluc: 310 mg/dL / Ketone:   / Bili:  / Urobili:    Blood:  / Protein:  / Nitrite:    Leuk Esterase:  / RBC:  / WBC    Sq Epi:  / Non Sq Epi:  / Bacteria:               LIVER FUNCTIONS - ( 21 Sep 2024 10:10 )  Alb: 3.1 g/dL / Pro: 8.1 g/dL / ALK PHOS: 64 U/L / ALT: 85 U/L / AST: 31 U/L / GGT: x                             RADIOLOGY & ADDITIONAL TESTS:    Imaging Personally Reviewed:    Consultant(s) Notes Reviewed:      Care Discussed with Consultants/Other Providers:

## 2024-09-22 LAB
ANION GAP SERPL CALC-SCNC: 12 MMOL/L — SIGNIFICANT CHANGE UP (ref 7–14)
BUN SERPL-MCNC: 14 MG/DL — SIGNIFICANT CHANGE UP (ref 7–23)
CALCIUM SERPL-MCNC: 9.4 MG/DL — SIGNIFICANT CHANGE UP (ref 8.4–10.5)
CHLORIDE SERPL-SCNC: 97 MMOL/L — LOW (ref 98–107)
CO2 SERPL-SCNC: 23 MMOL/L — SIGNIFICANT CHANGE UP (ref 22–31)
CREAT SERPL-MCNC: 0.83 MG/DL — SIGNIFICANT CHANGE UP (ref 0.5–1.3)
EGFR: 100 ML/MIN/1.73M2 — SIGNIFICANT CHANGE UP
GLUCOSE BLDC GLUCOMTR-MCNC: 209 MG/DL — HIGH (ref 70–99)
GLUCOSE BLDC GLUCOMTR-MCNC: 219 MG/DL — HIGH (ref 70–99)
GLUCOSE BLDC GLUCOMTR-MCNC: 241 MG/DL — HIGH (ref 70–99)
GLUCOSE BLDC GLUCOMTR-MCNC: 253 MG/DL — HIGH (ref 70–99)
GLUCOSE SERPL-MCNC: 227 MG/DL — HIGH (ref 70–99)
HCT VFR BLD CALC: 35.8 % — LOW (ref 39–50)
HGB BLD-MCNC: 11.9 G/DL — LOW (ref 13–17)
MCHC RBC-ENTMCNC: 28.2 PG — SIGNIFICANT CHANGE UP (ref 27–34)
MCHC RBC-ENTMCNC: 33.2 GM/DL — SIGNIFICANT CHANGE UP (ref 32–36)
MCV RBC AUTO: 84.8 FL — SIGNIFICANT CHANGE UP (ref 80–100)
NRBC # BLD: 0 /100 WBCS — SIGNIFICANT CHANGE UP (ref 0–0)
NRBC # FLD: 0 K/UL — SIGNIFICANT CHANGE UP (ref 0–0)
PLATELET # BLD AUTO: 537 K/UL — HIGH (ref 150–400)
POTASSIUM SERPL-MCNC: 4.5 MMOL/L — SIGNIFICANT CHANGE UP (ref 3.5–5.3)
POTASSIUM SERPL-SCNC: 4.5 MMOL/L — SIGNIFICANT CHANGE UP (ref 3.5–5.3)
RBC # BLD: 4.22 M/UL — SIGNIFICANT CHANGE UP (ref 4.2–5.8)
RBC # FLD: 12.5 % — SIGNIFICANT CHANGE UP (ref 10.3–14.5)
SODIUM SERPL-SCNC: 132 MMOL/L — LOW (ref 135–145)
WBC # BLD: 17.79 K/UL — HIGH (ref 3.8–10.5)
WBC # FLD AUTO: 17.79 K/UL — HIGH (ref 3.8–10.5)

## 2024-09-22 RX ADMIN — Medication 3 UNIT(S): at 13:09

## 2024-09-22 RX ADMIN — Medication 3 UNIT(S): at 18:23

## 2024-09-22 RX ADMIN — Medication 2: at 13:08

## 2024-09-22 RX ADMIN — Medication 3 UNIT(S): at 09:10

## 2024-09-22 RX ADMIN — TRAMADOL HYDROCHLORIDE 50 MILLIGRAM(S): 50 TABLET, COATED ORAL at 22:32

## 2024-09-22 RX ADMIN — ENOXAPARIN SODIUM 40 MILLIGRAM(S): 150 INJECTION SUBCUTANEOUS at 13:02

## 2024-09-22 RX ADMIN — ATORVASTATIN CALCIUM 80 MILLIGRAM(S): 10 TABLET, FILM COATED ORAL at 22:32

## 2024-09-22 RX ADMIN — INSULIN GLARGINE 25 UNIT(S): 300 INJECTION, SOLUTION SUBCUTANEOUS at 09:19

## 2024-09-22 RX ADMIN — PIPERACILLIN SODIUM AND TAZOBACTAM SODIUM 25 GRAM(S): 12; 1.5 INJECTION, POWDER, LYOPHILIZED, FOR SOLUTION INTRAVENOUS at 13:02

## 2024-09-22 RX ADMIN — Medication 2: at 09:09

## 2024-09-22 RX ADMIN — PIPERACILLIN SODIUM AND TAZOBACTAM SODIUM 25 GRAM(S): 12; 1.5 INJECTION, POWDER, LYOPHILIZED, FOR SOLUTION INTRAVENOUS at 06:08

## 2024-09-22 RX ADMIN — TRAMADOL HYDROCHLORIDE 50 MILLIGRAM(S): 50 TABLET, COATED ORAL at 09:18

## 2024-09-22 RX ADMIN — PIPERACILLIN SODIUM AND TAZOBACTAM SODIUM 25 GRAM(S): 12; 1.5 INJECTION, POWDER, LYOPHILIZED, FOR SOLUTION INTRAVENOUS at 22:32

## 2024-09-22 RX ADMIN — Medication 81 MILLIGRAM(S): at 13:02

## 2024-09-22 RX ADMIN — Medication 3: at 18:22

## 2024-09-22 RX ADMIN — TRAMADOL HYDROCHLORIDE 50 MILLIGRAM(S): 50 TABLET, COATED ORAL at 22:58

## 2024-09-22 RX ADMIN — TRAMADOL HYDROCHLORIDE 25 MILLIGRAM(S): 50 TABLET, COATED ORAL at 00:10

## 2024-09-22 RX ADMIN — TRAMADOL HYDROCHLORIDE 50 MILLIGRAM(S): 50 TABLET, COATED ORAL at 10:08

## 2024-09-22 NOTE — PROGRESS NOTE ADULT - SUBJECTIVE AND OBJECTIVE BOX
SUBJECTIVE / OVERNIGHT EVENTS:pt seen and examined  09-22-24     MEDICATIONS  (STANDING):  aspirin enteric coated 81 milliGRAM(s) Oral daily  atorvastatin 80 milliGRAM(s) Oral at bedtime  collagenase Ointment 1 Application(s) Topical daily  collagenase Ointment 1 Application(s) Topical daily  Dakins Solution - 1/4 Strength 1 Application(s) Topical daily  dextrose 5%. 1000 milliLiter(s) (100 mL/Hr) IV Continuous <Continuous>  dextrose 5%. 1000 milliLiter(s) (50 mL/Hr) IV Continuous <Continuous>  dextrose 50% Injectable 25 Gram(s) IV Push once  dextrose 50% Injectable 12.5 Gram(s) IV Push once  dextrose 50% Injectable 25 Gram(s) IV Push once  enoxaparin Injectable 40 milliGRAM(s) SubCutaneous every 24 hours  glucagon  Injectable 1 milliGRAM(s) IntraMuscular once  influenza   Vaccine 0.5 milliLiter(s) IntraMuscular once  insulin glargine Injectable (LANTUS) 25 Unit(s) SubCutaneous every morning  insulin lispro (ADMELOG) corrective regimen sliding scale   SubCutaneous three times a day before meals  insulin lispro (ADMELOG) corrective regimen sliding scale   SubCutaneous at bedtime  insulin lispro Injectable (ADMELOG) 3 Unit(s) SubCutaneous three times a day before meals  lidocaine 1% Injectable 20 milliLiter(s) Local Injection once  piperacillin/tazobactam IVPB.. 3.375 Gram(s) IV Intermittent every 8 hours    MEDICATIONS  (PRN):  acetaminophen     Tablet .. 650 milliGRAM(s) Oral every 6 hours PRN Temp greater or equal to 38C (100.4F), Mild Pain (1 - 3)  dextrose Oral Gel 15 Gram(s) Oral once PRN Blood Glucose LESS THAN 70 milliGRAM(s)/deciliter  traMADol 25 milliGRAM(s) Oral every 6 hours PRN Moderate Pain (4 - 6)  traMADol 50 milliGRAM(s) Oral every 6 hours PRN Severe Pain (7 - 10)      Vital Signs Last 24 Hrs  T(C): 36.4 (09-22-24 @ 17:00), Max: 36.9 (09-21-24 @ 23:29)  T(F): 97.6 (09-22-24 @ 17:00), Max: 98.5 (09-22-24 @ 01:48)  HR: 82 (09-22-24 @ 17:00) (74 - 87)  BP: 145/69 (09-22-24 @ 17:00) (119/73 - 148/70)  BP(mean): --  RR: 18 (09-22-24 @ 17:00) (17 - 18)  SpO2: 99% (09-22-24 @ 17:00) (96% - 100%)      Constitutional: No fever, fatigue  Skin: No rash.  Eyes: No recent vision problems or eye pain.  ENT: No congestion, ear pain, or sore throat.  Cardiovascular: No chest pain or palpation.  Respiratory: No cough, shortness of breath, congestion, or wheezing.  Gastrointestinal: No abdominal pain, nausea, vomiting, or diarrhea.  Genitourinary: No dysuria.  Musculoskeletal: No joint swelling.  Neurologic: No headache.    PHYSICAL EXAM:  GENERAL: NAD  EYES: EOMI, PERRLA  NECK: Supple, No JVD  CHEST/LUNG: cta jai  HEART:  S1 , S2 +  ABDOMEN: soft , bs+  EXTREMITIES:  left foot dressing +  NEUROLOGY:alert awake oriented     LABS:  09-22    132[L]  |  97[L]  |  14  ----------------------------<  227[H]  4.5   |  23  |  0.83    Ca    9.4      22 Sep 2024 06:37    TPro  8.1  /  Alb  3.1[L]  /  TBili  0.4  /  DBili      /  AST  31  /  ALT  85[H]  /  AlkPhos  64  09-21    Creatinine Trend: 0.83 <--, 0.90 <--, 1.06 <--, 0.92 <--, 0.92 <--, 0.86 <--, 0.88 <--, 0.95 <--                        11.9   17.79 )-----------( 537      ( 22 Sep 2024 06:37 )             35.8     Urine Studies:  Urinalysis Basic - ( 22 Sep 2024 06:37 )    Color:  / Appearance:  / SG:  / pH:   Gluc: 227 mg/dL / Ketone:   / Bili:  / Urobili:    Blood:  / Protein:  / Nitrite:    Leuk Esterase:  / RBC:  / WBC    Sq Epi:  / Non Sq Epi:  / Bacteria:               LIVER FUNCTIONS - ( 21 Sep 2024 10:10 )  Alb: 3.1 g/dL / Pro: 8.1 g/dL / ALK PHOS: 64 U/L / ALT: 85 U/L / AST: 31 U/L / GGT: x                             RADIOLOGY & ADDITIONAL TESTS:    Imaging Personally Reviewed:    Consultant(s) Notes Reviewed:      Care Discussed with Consultants/Other Providers:

## 2024-09-22 NOTE — PROGRESS NOTE ADULT - ASSESSMENT
59M presents for 9/20 s/p bedside left foot hallux disarticulation, open.  - Pt was seen and evaluated  - Afebrile, labs pending  - 9/20 s/p bedside left foot hallux disarticulation, open: no purulence, mild malodor, no drainage, bleeding controlled. Left foot medial 2nd digit necrotic tissue, no purulence. Right foot no open wounds, no signs of infection.  - Left foot X-ray: 1st interspace soft tissue emphysema, no OM   - Left foot MRI: OM of the distal phalanx of the hallux  - Left foot wound culture: growing >4 types of aerobics and/or anaerobic bacteria.  - Vac order placed  - Ordered Santyl  - ID recs appreciated   - Pod plan local wound care pending appearance   - Discussed with attending

## 2024-09-22 NOTE — PROGRESS NOTE ADULT - SUBJECTIVE AND OBJECTIVE BOX
Patient is a 59y old  Male who presents with a chief complaint of Left foot wound infection (21 Sep 2024 11:21)       INTERVAL HPI/OVERNIGHT EVENTS:  Patient seen and evaluated at bedside.  Pt is resting comfortable in NAD. Denies N/V/F/C.      Allergies    No Known Allergies    Intolerances        Vital Signs Last 24 Hrs  T(C): 36.8 (22 Sep 2024 09:00), Max: 36.9 (21 Sep 2024 23:29)  T(F): 98.2 (22 Sep 2024 09:00), Max: 98.5 (22 Sep 2024 01:48)  HR: 87 (22 Sep 2024 09:00) (85 - 87)  BP: 119/73 (22 Sep 2024 09:00) (119/73 - 144/81)  BP(mean): --  RR: 18 (22 Sep 2024 09:00) (17 - 18)  SpO2: 98% (22 Sep 2024 09:00) (96% - 99%)    Parameters below as of 22 Sep 2024 09:00  Patient On (Oxygen Delivery Method): room air        LABS:                        11.9   17.79 )-----------( 537      ( 22 Sep 2024 06:37 )             35.8     09-22    132[L]  |  97[L]  |  14  ----------------------------<  227[H]  4.5   |  23  |  0.83    Ca    9.4      22 Sep 2024 06:37    TPro  8.1  /  Alb  3.1[L]  /  TBili  0.4  /  DBili  x   /  AST  31  /  ALT  85[H]  /  AlkPhos  64  09-21      Urinalysis Basic - ( 22 Sep 2024 06:37 )    Color: x / Appearance: x / SG: x / pH: x  Gluc: 227 mg/dL / Ketone: x  / Bili: x / Urobili: x   Blood: x / Protein: x / Nitrite: x   Leuk Esterase: x / RBC: x / WBC x   Sq Epi: x / Non Sq Epi: x / Bacteria: x      CAPILLARY BLOOD GLUCOSE      POCT Blood Glucose.: 219 mg/dL (22 Sep 2024 08:41)  POCT Blood Glucose.: 209 mg/dL (22 Sep 2024 01:27)  POCT Blood Glucose.: 227 mg/dL (21 Sep 2024 22:55)  POCT Blood Glucose.: 260 mg/dL (21 Sep 2024 17:21)  POCT Blood Glucose.: 277 mg/dL (21 Sep 2024 11:46)      Lower Extremity Physical Exam:  Vascular: DP/PT 1/4, B/L, CFT <3 seconds B/L, Temperature gradient warm to warm, left, warm to cool, right  Neuro: Epicritic sensation intact to the level of digits, B/L.  Musculoskeletal/Ortho: unremarkable  Skin: 9/20 s/p bedside left foot hallux disarticulation, open: no purulence, mild malodor, no drainage, bleeding controlled. Left foot medial 2nd digit necrotic tissue, no purulence. Right foot no open wounds, no signs of infection.      RADIOLOGY & ADDITIONAL TESTS:

## 2024-09-23 LAB
ANION GAP SERPL CALC-SCNC: 14 MMOL/L — SIGNIFICANT CHANGE UP (ref 7–14)
BUN SERPL-MCNC: 12 MG/DL — SIGNIFICANT CHANGE UP (ref 7–23)
CALCIUM SERPL-MCNC: 9.5 MG/DL — SIGNIFICANT CHANGE UP (ref 8.4–10.5)
CHLORIDE SERPL-SCNC: 96 MMOL/L — LOW (ref 98–107)
CO2 SERPL-SCNC: 21 MMOL/L — LOW (ref 22–31)
CREAT SERPL-MCNC: 0.83 MG/DL — SIGNIFICANT CHANGE UP (ref 0.5–1.3)
EGFR: 100 ML/MIN/1.73M2 — SIGNIFICANT CHANGE UP
GLUCOSE BLDC GLUCOMTR-MCNC: 238 MG/DL — HIGH (ref 70–99)
GLUCOSE BLDC GLUCOMTR-MCNC: 241 MG/DL — HIGH (ref 70–99)
GLUCOSE BLDC GLUCOMTR-MCNC: 242 MG/DL — HIGH (ref 70–99)
GLUCOSE BLDC GLUCOMTR-MCNC: 266 MG/DL — HIGH (ref 70–99)
GLUCOSE SERPL-MCNC: 241 MG/DL — HIGH (ref 70–99)
HCT VFR BLD CALC: 37.9 % — LOW (ref 39–50)
HGB BLD-MCNC: 12.3 G/DL — LOW (ref 13–17)
MCHC RBC-ENTMCNC: 27.6 PG — SIGNIFICANT CHANGE UP (ref 27–34)
MCHC RBC-ENTMCNC: 32.5 GM/DL — SIGNIFICANT CHANGE UP (ref 32–36)
MCV RBC AUTO: 85.2 FL — SIGNIFICANT CHANGE UP (ref 80–100)
MRSA PCR RESULT.: SIGNIFICANT CHANGE UP
NRBC # BLD: 0 /100 WBCS — SIGNIFICANT CHANGE UP (ref 0–0)
NRBC # FLD: 0 K/UL — SIGNIFICANT CHANGE UP (ref 0–0)
PLATELET # BLD AUTO: 574 K/UL — HIGH (ref 150–400)
POTASSIUM SERPL-MCNC: 4.7 MMOL/L — SIGNIFICANT CHANGE UP (ref 3.5–5.3)
POTASSIUM SERPL-SCNC: 4.7 MMOL/L — SIGNIFICANT CHANGE UP (ref 3.5–5.3)
RBC # BLD: 4.45 M/UL — SIGNIFICANT CHANGE UP (ref 4.2–5.8)
RBC # FLD: 12.4 % — SIGNIFICANT CHANGE UP (ref 10.3–14.5)
S AUREUS DNA NOSE QL NAA+PROBE: SIGNIFICANT CHANGE UP
SODIUM SERPL-SCNC: 131 MMOL/L — LOW (ref 135–145)
WBC # BLD: 16.87 K/UL — HIGH (ref 3.8–10.5)
WBC # FLD AUTO: 16.87 K/UL — HIGH (ref 3.8–10.5)

## 2024-09-23 PROCEDURE — 99232 SBSQ HOSP IP/OBS MODERATE 35: CPT

## 2024-09-23 RX ORDER — ACETAMINOPHEN 325 MG
1000 TABLET ORAL ONCE
Refills: 0 | Status: COMPLETED | OUTPATIENT
Start: 2024-09-23 | End: 2024-09-23

## 2024-09-23 RX ADMIN — TRAMADOL HYDROCHLORIDE 50 MILLIGRAM(S): 50 TABLET, COATED ORAL at 10:11

## 2024-09-23 RX ADMIN — ATORVASTATIN CALCIUM 80 MILLIGRAM(S): 10 TABLET, FILM COATED ORAL at 22:11

## 2024-09-23 RX ADMIN — Medication 1 APPLICATION(S): at 13:14

## 2024-09-23 RX ADMIN — Medication 3 UNIT(S): at 13:09

## 2024-09-23 RX ADMIN — Medication 3: at 13:09

## 2024-09-23 RX ADMIN — Medication 1 APPLICATION(S): at 13:11

## 2024-09-23 RX ADMIN — Medication 3 UNIT(S): at 18:16

## 2024-09-23 RX ADMIN — Medication 1000 MILLIGRAM(S): at 14:59

## 2024-09-23 RX ADMIN — Medication 400 MILLIGRAM(S): at 14:29

## 2024-09-23 RX ADMIN — TRAMADOL HYDROCHLORIDE 50 MILLIGRAM(S): 50 TABLET, COATED ORAL at 09:11

## 2024-09-23 RX ADMIN — ENOXAPARIN SODIUM 40 MILLIGRAM(S): 150 INJECTION SUBCUTANEOUS at 13:41

## 2024-09-23 RX ADMIN — TRAMADOL HYDROCHLORIDE 50 MILLIGRAM(S): 50 TABLET, COATED ORAL at 18:15

## 2024-09-23 RX ADMIN — Medication 3 UNIT(S): at 09:13

## 2024-09-23 RX ADMIN — TRAMADOL HYDROCHLORIDE 50 MILLIGRAM(S): 50 TABLET, COATED ORAL at 19:15

## 2024-09-23 RX ADMIN — INSULIN GLARGINE 25 UNIT(S): 300 INJECTION, SOLUTION SUBCUTANEOUS at 09:12

## 2024-09-23 RX ADMIN — Medication 1 APPLICATION(S): at 13:12

## 2024-09-23 RX ADMIN — Medication 2: at 09:13

## 2024-09-23 RX ADMIN — Medication 81 MILLIGRAM(S): at 13:42

## 2024-09-23 RX ADMIN — Medication 2: at 18:15

## 2024-09-23 RX ADMIN — PIPERACILLIN SODIUM AND TAZOBACTAM SODIUM 25 GRAM(S): 12; 1.5 INJECTION, POWDER, LYOPHILIZED, FOR SOLUTION INTRAVENOUS at 05:40

## 2024-09-23 NOTE — PROGRESS NOTE ADULT - ASSESSMENT
59M presents for 9/20 s/p bedside left foot hallux disarticulation, open.  - Pt was seen and evaluated  - Afebrile, WBC 16.87  - 9/20 s/p bedside left foot hallux disarticulation, open: no purulence, mild malodor, no drainage, bleeding controlled. Left foot medial 2nd digit necrotic tissue, no purulence. Right foot no open wounds, no signs of infection.  - Left foot X-ray: 1st interspace soft tissue emphysema, no OM   - Left foot MRI: OM of the distal phalanx of the hallux  - Left foot wound culture: proteus v;garis, klebsiella pneumonia, aeromonas hydrophila, staph lugdunesis (prelim)  - ID recs appreciated   - OK to apply santyl VAC today 9/23 to left foot   - Pod plan local wound care pending appearance   - Discussed with attending  59M presents for 9/20 s/p bedside left foot hallux disarticulation, open.  - Pt was seen and evaluated  - Afebrile, WBC 16.87  - 9/20 s/p bedside left foot hallux disarticulation, open: no purulence, mild malodor, no drainage, bleeding controlled. Left foot medial 2nd digit necrotic tissue, no purulence. Right foot no open wounds, no signs of infection.  - Left foot X-ray: 1st interspace soft tissue emphysema, no OM   - Left foot MRI: OM of the distal phalanx of the hallux  - Left foot wound culture: proteus vulgaris, klebsiella pneumonia, aeromonas hydrophila, staph lugdunesis (prelim)  - ID recs appreciated   - Pt continues to adamantly refuse further debridement and OR debridement of infection. Discussed with pt and wife bedside that pt requires amputation of the second digit and likely requires a transmetatarsal amputation for proper source control of infection. Pt and wife refused and would prefer to attempt wound vac at this time and will not consent to surgical amputation. Pt continues to refuse all podiatric recommendations this admission.  - OK to apply santyl VAC today 9/23 to left foot   - Pod plan local wound care pending appearance   - Discussed with attending  59M presents for 9/20 s/p bedside left foot hallux disarticulation, open.  - Pt was seen and evaluated  - Afebrile, WBC 16.87  - 9/20 s/p bedside left foot hallux disarticulation, open: no purulence, mild malodor, no drainage, bleeding controlled. Left foot medial 2nd digit necrotic tissue, no purulence. Right foot no open wounds, no signs of infection.  - Left foot X-ray: 1st interspace soft tissue emphysema, no OM   - Left foot MRI: OM of the distal phalanx of the hallux  - Left foot wound culture: proteus vulgaris, klebsiella pneumonia, aeromonas hydrophila, staph lugdunesis (prelim)  - ID recs appreciated   - Pt continues to adamantly refuse further debridement and OR debridement of infection. Discussed with pt and wife bedside that pt requires amputation of the second digit and likely requires a transmetatarsal amputation for proper source control of infection. Pt and wife refused and would prefer to attempt wound vac at this time and will not consent to surgical amputation. Pt continues to refuse all podiatric recommendations this admission. Explained to pt and family risk of worsening infection, sepsis, loss of limb and loss of life. Pt demonstrated understanding and still adamantly refuses podiatric surgical intervention.   - OK to apply santyl VAC today 9/23 to left foot   - Pod plan local wound care pending appearance   - Discussed with attending

## 2024-09-23 NOTE — DIETITIAN INITIAL EVALUATION ADULT - ORAL INTAKE PTA/DIET HISTORY
Patient reports generally good appetite/PO intake PTA, consumes a regular diet at baseline 2-3 meals per day. Pt confirms NKFA, doesn't eat beef. Pt reported -175lbs. Pt denies any recent weight changes.

## 2024-09-23 NOTE — DIETITIAN INITIAL EVALUATION ADULT - PERTINENT MEDS FT
MEDICATIONS  (STANDING):  aspirin enteric coated 81 milliGRAM(s) Oral daily  atorvastatin 80 milliGRAM(s) Oral at bedtime  collagenase Ointment 1 Application(s) Topical daily  collagenase Ointment 1 Application(s) Topical daily  Dakins Solution - 1/4 Strength 1 Application(s) Topical daily  dextrose 5%. 1000 milliLiter(s) (50 mL/Hr) IV Continuous <Continuous>  dextrose 5%. 1000 milliLiter(s) (100 mL/Hr) IV Continuous <Continuous>  dextrose 50% Injectable 25 Gram(s) IV Push once  dextrose 50% Injectable 12.5 Gram(s) IV Push once  dextrose 50% Injectable 25 Gram(s) IV Push once  enoxaparin Injectable 40 milliGRAM(s) SubCutaneous every 24 hours  glucagon  Injectable 1 milliGRAM(s) IntraMuscular once  influenza   Vaccine 0.5 milliLiter(s) IntraMuscular once  insulin glargine Injectable (LANTUS) 25 Unit(s) SubCutaneous every morning  insulin lispro (ADMELOG) corrective regimen sliding scale   SubCutaneous three times a day before meals  insulin lispro (ADMELOG) corrective regimen sliding scale   SubCutaneous at bedtime  insulin lispro Injectable (ADMELOG) 3 Unit(s) SubCutaneous three times a day before meals  lidocaine 1% Injectable 20 milliLiter(s) Local Injection once    MEDICATIONS  (PRN):  acetaminophen     Tablet .. 650 milliGRAM(s) Oral every 6 hours PRN Temp greater or equal to 38C (100.4F), Mild Pain (1 - 3)  dextrose Oral Gel 15 Gram(s) Oral once PRN Blood Glucose LESS THAN 70 milliGRAM(s)/deciliter  traMADol 25 milliGRAM(s) Oral every 6 hours PRN Moderate Pain (4 - 6)  traMADol 50 milliGRAM(s) Oral every 6 hours PRN Severe Pain (7 - 10)

## 2024-09-23 NOTE — DIETITIAN INITIAL EVALUATION ADULT - PERTINENT LABORATORY DATA
09-23    131[L]  |  96[L]  |  12  ----------------------------<  241[H]  4.7   |  21[L]  |  0.83    Ca    9.5      23 Sep 2024 05:38    CAPILLARY BLOOD GLUCOSE  POCT Blood Glucose.: 242 mg/dL (23 Sep 2024 08:40)  POCT Blood Glucose.: 241 mg/dL (22 Sep 2024 22:53)  POCT Blood Glucose.: 253 mg/dL (22 Sep 2024 17:49)    A1C with Estimated Average Glucose Result: 8.0 % (09-16-24 @ 02:58)

## 2024-09-23 NOTE — DIETITIAN INITIAL EVALUATION ADULT - REASON FOR ADMISSION
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

## 2024-09-23 NOTE — PROGRESS NOTE ADULT - SUBJECTIVE AND OBJECTIVE BOX
Infectious Diseases Follow Up:    Patient is a 59y old  Male who presents with a chief complaint of Per chart, Pt is 58 yo man with history of type 2 DM (on insulin) and CVA (~2 years ago) with residual left-sided weakness and numbness presents with increasing redness and swelling of his left foot and leg after pt sustained a left foot foreign body puncture wound, admitted with sepsis likely 2/2 infected left foot wound with superimposed cellulitis.    (23 Sep 2024 11:46)      Interval History/ROS:  No acute events     Allergies  No Known Allergies        ANTIMICROBIALS:      Current Abx:     Previous Abx     OTHER MEDS:  MEDICATIONS  (STANDING):  acetaminophen     Tablet .. 650 every 6 hours PRN  aspirin enteric coated 81 daily  atorvastatin 80 at bedtime  dextrose 50% Injectable 25 once  dextrose 50% Injectable 12.5 once  dextrose 50% Injectable 25 once  dextrose Oral Gel 15 once PRN  enoxaparin Injectable 40 every 24 hours  glucagon  Injectable 1 once  influenza   Vaccine 0.5 once  insulin glargine Injectable (LANTUS) 25 every morning  insulin lispro (ADMELOG) corrective regimen sliding scale  three times a day before meals  insulin lispro (ADMELOG) corrective regimen sliding scale  at bedtime  insulin lispro Injectable (ADMELOG) 3 three times a day before meals  traMADol 25 every 6 hours PRN  traMADol 50 every 6 hours PRN      Vital Signs Last 24 Hrs  T(C): 36.7 (23 Sep 2024 05:34), Max: 37.1 (22 Sep 2024 22:41)  T(F): 98 (23 Sep 2024 05:34), Max: 98.8 (22 Sep 2024 22:41)  HR: 90 (23 Sep 2024 05:34) (82 - 97)  BP: 135/60 (23 Sep 2024 05:34) (130/64 - 145/69)  BP(mean): --  RR: 18 (23 Sep 2024 05:34) (16 - 18)  SpO2: 96% (23 Sep 2024 05:34) (96% - 99%)    Parameters below as of 23 Sep 2024 05:34  Patient On (Oxygen Delivery Method): room air        PHYSICAL EXAM:  GENERAL: NAD, well-developed  HEAD:  Atraumatic, Normocephalic  CHEST/LUNG: On RA, not in respiratory distress   EXTREMITIES:  L foot wrapped  PSYCH: AAOx3                                   12.3   16.87 )-----------( 574      ( 23 Sep 2024 05:38 )             37.9       09-23    131[L]  |  96[L]  |  12  ----------------------------<  241[H]  4.7   |  21[L]  |  0.83    Ca    9.5      23 Sep 2024 05:38        Urinalysis Basic - ( 23 Sep 2024 05:38 )    Color: x / Appearance: x / SG: x / pH: x  Gluc: 241 mg/dL / Ketone: x  / Bili: x / Urobili: x   Blood: x / Protein: x / Nitrite: x   Leuk Esterase: x / RBC: x / WBC x   Sq Epi: x / Non Sq Epi: x / Bacteria: x        MICROBIOLOGY:  v  .Abscess left foot  09-15-24   Culture yields >4 types of aerobic and/or anaerobic bacteria  Call client services within 7 days if further workup is clinically  indicated. Culture includes  Moderate Aeromonas hydrophila/caviae complex  Moderate Proteus vulgaris group  Few Staphylococcus epidermidis "Susceptibilities not performed"  Moderate Klebsiella pneumoniae  Few Staphylococcus lugdunensis  Unable to evaluate further due to Proteus overgrowth  --  Proteus vulgaris group  Aeromonas hydrophila/caviae complex  Klebsiella pneumoniae  Staphylococcus lugdunensis      .Blood Blood-Peripheral  09-15-24   No growth at 5 days  --  --      .Blood Blood-Peripheral  09-11-24   No growth at 5 days  --  --                RADIOLOGY:

## 2024-09-23 NOTE — DIETITIAN INITIAL EVALUATION ADULT - PERSON TAUGHT/METHOD
Pt provided with written and verbal nutrition education on type 2 DM diet. Topics discussed include: MyPlate healthy eating guidelines, avoidance of sugar sweetened beverages and recommended alternatives, label reading, sources of carbohydrates, complex vs simple carbohydrates, inclusion of whole grains and fiber, mixed meals (pairing protein with carbohydrate for optimal blood glucose response), hypoglycemia prevention/management and timing of meals/snacks. Discussed importance of self monitoring blood glucose and encouraged outpatient endocrinology follow up. Pt verbalized understanding of nutrition education./verbal instruction/written material/patient instructed

## 2024-09-23 NOTE — PROGRESS NOTE ADULT - ASSESSMENT
This is a 60 y/o M w/ PMHx of DM2, CVA w/ LLE residual WA presenting for L foot wound, seen initially on 9/12, findings of foreign body at that time w/ soft tissue gas, s/p I&D with removal of foreign. Pt then left AMA, sent out on Augmentin, however now returning to the ER with new lesion on dorsum, enlarging w/ bloody drainage.   Pt afebrile, WBC 21 (was 27 on 9/12).  XR L foot w/ mottled air collection in 1st web soft tissues, no OM.  Now s/p bedside I&D w/ podiatry within 1st interspace, then plantar 2nd digit w/ purulence.    #L distal hallux OM, refusing surgical intervention at this time    #L foot 1st interspace soft tissue infection   #Leukocytosis     MRI: Acute osteomyelitis of the distal phalanx of the hallux.  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    Plan:   1. C/w Zosyn 3.375 g q8 via extended infusion while inpatient   2. F/u BCx, wound Cx, Aeromonas, Porteus vulgaris, Kleb pneumoniae, staph lugdunensis    3. Pt refusing further surgery at this time, per podiatry will need amputation, possible TMA, pt refusing    4. When ready for discharge, pt can be sent on Augmentin 875/125 mg BID, Ciprofloxacin 500 mg BID for total 6 week course until 10/27/24    Thank you for this consult. Inpatient ID consult team will sign off.    Further changes in lab values, imaging studies, or clinical status will not be known to ID inpatient consultants unless specifically communicated by primary team.    Michel Aburto MD  Attending Physician  Division of Infectious Diseases  Department of Medicine    Please contact through MS Teams message.  Office: [phone] (after 5 PM or weekend)        Thank you for this consult. Inpatient ID team will follow.    Michel Aburto M.D.  Attending Physician  Division of Infectious Diseases  Department of Medicine    Please contact through MS Teams message.  Office: [phone] (after 5 PM or weekend).

## 2024-09-23 NOTE — PROGRESS NOTE ADULT - SUBJECTIVE AND OBJECTIVE BOX
SUBJECTIVE / OVERNIGHT EVENTS:pt seen and examined  09-23-24     MEDICATIONS  (STANDING):  aspirin enteric coated 81 milliGRAM(s) Oral daily  atorvastatin 80 milliGRAM(s) Oral at bedtime  collagenase Ointment 1 Application(s) Topical daily  collagenase Ointment 1 Application(s) Topical daily  Dakins Solution - 1/4 Strength 1 Application(s) Topical daily  dextrose 5%. 1000 milliLiter(s) (50 mL/Hr) IV Continuous <Continuous>  dextrose 5%. 1000 milliLiter(s) (100 mL/Hr) IV Continuous <Continuous>  dextrose 50% Injectable 25 Gram(s) IV Push once  dextrose 50% Injectable 25 Gram(s) IV Push once  dextrose 50% Injectable 12.5 Gram(s) IV Push once  enoxaparin Injectable 40 milliGRAM(s) SubCutaneous every 24 hours  glucagon  Injectable 1 milliGRAM(s) IntraMuscular once  influenza   Vaccine 0.5 milliLiter(s) IntraMuscular once  insulin glargine Injectable (LANTUS) 25 Unit(s) SubCutaneous every morning  insulin lispro (ADMELOG) corrective regimen sliding scale   SubCutaneous three times a day before meals  insulin lispro (ADMELOG) corrective regimen sliding scale   SubCutaneous at bedtime  insulin lispro Injectable (ADMELOG) 3 Unit(s) SubCutaneous three times a day before meals  lidocaine 1% Injectable 20 milliLiter(s) Local Injection once    MEDICATIONS  (PRN):  acetaminophen     Tablet .. 650 milliGRAM(s) Oral every 6 hours PRN Temp greater or equal to 38C (100.4F), Mild Pain (1 - 3)  dextrose Oral Gel 15 Gram(s) Oral once PRN Blood Glucose LESS THAN 70 milliGRAM(s)/deciliter  traMADol 25 milliGRAM(s) Oral every 6 hours PRN Moderate Pain (4 - 6)    Vital Signs Last 24 Hrs  T(C): 37.1 (09-23-24 @ 21:26), Max: 37.1 (09-23-24 @ 21:26)  T(F): 98.8 (09-23-24 @ 21:26), Max: 98.8 (09-23-24 @ 21:26)  HR: 82 (09-23-24 @ 21:26) (70 - 90)  BP: 136/71 (09-23-24 @ 21:26) (130/60 - 143/66)  BP(mean): --  RR: 18 (09-23-24 @ 21:26) (16 - 18)  SpO2: 95% (09-23-24 @ 21:26) (95% - 98%)        Constitutional: No fever, fatigue  Skin: No rash.  Eyes: No recent vision problems or eye pain.  ENT: No congestion, ear pain, or sore throat.  Cardiovascular: No chest pain or palpation.  Respiratory: No cough, shortness of breath, congestion, or wheezing.  Gastrointestinal: No abdominal pain, nausea, vomiting, or diarrhea.  Genitourinary: No dysuria.  Musculoskeletal: No joint swelling.  Neurologic: No headache.    PHYSICAL EXAM:  GENERAL: NAD  EYES: EOMI, PERRLA  NECK: Supple, No JVD  CHEST/LUNG: cta jai  HEART:  S1 , S2 +  ABDOMEN: soft , bs+  EXTREMITIES:  left foot dressing +  wound vac+  NEUROLOGY:alert awake oriented     LABS:  09-23    131[L]  |  96[L]  |  12  ----------------------------<  241[H]  4.7   |  21[L]  |  0.83    Ca    9.5      23 Sep 2024 05:38      Creatinine Trend: 0.83 <--, 0.83 <--, 0.90 <--, 1.06 <--, 0.92 <--, 0.92 <--, 0.86 <--                        12.3   16.87 )-----------( 574      ( 23 Sep 2024 05:38 )             37.9     Urine Studies:  Urinalysis Basic - ( 23 Sep 2024 05:38 )    Color:  / Appearance:  / SG:  / pH:   Gluc: 241 mg/dL / Ketone:   / Bili:  / Urobili:    Blood:  / Protein:  / Nitrite:    Leuk Esterase:  / RBC:  / WBC    Sq Epi:  / Non Sq Epi:  / Bacteria:                                 RADIOLOGY & ADDITIONAL TESTS:    Imaging Personally Reviewed:    Consultant(s) Notes Reviewed:      Care Discussed with Consultants/Other Providers:

## 2024-09-23 NOTE — PROGRESS NOTE ADULT - SUBJECTIVE AND OBJECTIVE BOX
Patient is a 59y old  Male who presents with a chief complaint of Left foot wound infection (22 Sep 2024 11:23)       INTERVAL HPI/OVERNIGHT EVENTS:  Patient seen and evaluated at bedside.  Pt is resting comfortable in NAD. Denies N/V/F/C.      Allergies    No Known Allergies    Intolerances        Vital Signs Last 24 Hrs  T(C): 36.7 (23 Sep 2024 05:34), Max: 37.1 (22 Sep 2024 22:41)  T(F): 98 (23 Sep 2024 05:34), Max: 98.8 (22 Sep 2024 22:41)  HR: 90 (23 Sep 2024 05:34) (74 - 97)  BP: 135/60 (23 Sep 2024 05:34) (130/64 - 148/70)  BP(mean): --  RR: 18 (23 Sep 2024 05:34) (16 - 18)  SpO2: 96% (23 Sep 2024 05:34) (96% - 100%)    Parameters below as of 23 Sep 2024 05:34  Patient On (Oxygen Delivery Method): room air        LABS:                        12.3   16.87 )-----------( 574      ( 23 Sep 2024 05:38 )             37.9     09-23    131[L]  |  96[L]  |  12  ----------------------------<  241[H]  4.7   |  21[L]  |  0.83    Ca    9.5      23 Sep 2024 05:38    TPro  8.1  /  Alb  3.1[L]  /  TBili  0.4  /  DBili  x   /  AST  31  /  ALT  85[H]  /  AlkPhos  64  09-21      Urinalysis Basic - ( 23 Sep 2024 05:38 )    Color: x / Appearance: x / SG: x / pH: x  Gluc: 241 mg/dL / Ketone: x  / Bili: x / Urobili: x   Blood: x / Protein: x / Nitrite: x   Leuk Esterase: x / RBC: x / WBC x   Sq Epi: x / Non Sq Epi: x / Bacteria: x      CAPILLARY BLOOD GLUCOSE      POCT Blood Glucose.: 242 mg/dL (23 Sep 2024 08:40)  POCT Blood Glucose.: 241 mg/dL (22 Sep 2024 22:53)  POCT Blood Glucose.: 253 mg/dL (22 Sep 2024 17:49)      Lower Extremity Physical Exam:  Vascular: DP/PT 1/4, B/L, CFT <3 seconds B/L, Temperature gradient warm to warm, left, warm to cool, right  Neuro: Epicritic sensation intact to the level of digits, B/L.  Musculoskeletal/Ortho: unremarkable  Skin: 9/20 s/p bedside left foot hallux disarticulation, open: no purulence, mild malodor, no drainage, bleeding controlled. Left foot medial 2nd digit necrotic tissue, no purulence. Right foot no open wounds, no signs of infection.    RADIOLOGY & ADDITIONAL TESTS:

## 2024-09-23 NOTE — DIETITIAN INITIAL EVALUATION ADULT - OTHER INFO
Patient seen at bedside. Pt reported fair appetite/PO intake in house.  Per RN sy, Pt with variable PO intake % of meals noted. Food and fluid preferences explored and noted. Patient denies any nausea, vomiting, diarrhea, constipation or difficulty chewing and swallowing at present. Per Pt, last BM 9/22. No bowel regimen at this time. As per wound care note 9/20 No pressure injury. Labs noted for elevated POCT 242-253 mg/dL, and A1c 8.0%. Diabetes education provided. Endocrinology following.

## 2024-09-23 NOTE — ADVANCED PRACTICE NURSE CONSULT - REASON FOR CONSULT
Patient seen on skin care rounds for NPWT/VAC placement to left foot. Wound care team will continue to see patient on M W F schedule.   Recommending order to include white foam due to exposed bone.     Left foot: See A&I flowsheet for assessment details.  In the event that NPWT VAC is compromised longer than 2 hours, please turn off the machine. Notify Podiatry Team (#[id number]). Please remove the NPWT VAC dressing, Cleanse with NS, pat dry. Apply Liquid barrier film to periwound skin (allow to dry). Apply collagenase (Santyl), nickel-coin thickness, to entire base of wound. Apply Dakins 1/4 strength moistened gauze to base of wound, cover with dry 4x4 gauze and secure with kerlix. Change daily and PRN if soiled.     Please contact Wound/Ostomy Care Service Line if we can be of further assistance (ext [phone]).

## 2024-09-24 LAB
ANION GAP SERPL CALC-SCNC: 15 MMOL/L — HIGH (ref 7–14)
BUN SERPL-MCNC: 15 MG/DL — SIGNIFICANT CHANGE UP (ref 7–23)
CALCIUM SERPL-MCNC: 9.8 MG/DL — SIGNIFICANT CHANGE UP (ref 8.4–10.5)
CHLORIDE SERPL-SCNC: 97 MMOL/L — LOW (ref 98–107)
CO2 SERPL-SCNC: 23 MMOL/L — SIGNIFICANT CHANGE UP (ref 22–31)
CREAT SERPL-MCNC: 0.93 MG/DL — SIGNIFICANT CHANGE UP (ref 0.5–1.3)
EGFR: 94 ML/MIN/1.73M2 — SIGNIFICANT CHANGE UP
GLUCOSE BLDC GLUCOMTR-MCNC: 194 MG/DL — HIGH (ref 70–99)
GLUCOSE BLDC GLUCOMTR-MCNC: 216 MG/DL — HIGH (ref 70–99)
GLUCOSE BLDC GLUCOMTR-MCNC: 246 MG/DL — HIGH (ref 70–99)
GLUCOSE BLDC GLUCOMTR-MCNC: 272 MG/DL — HIGH (ref 70–99)
GLUCOSE BLDC GLUCOMTR-MCNC: 274 MG/DL — HIGH (ref 70–99)
GLUCOSE SERPL-MCNC: 191 MG/DL — HIGH (ref 70–99)
HCT VFR BLD CALC: 40.1 % — SIGNIFICANT CHANGE UP (ref 39–50)
HGB BLD-MCNC: 12.9 G/DL — LOW (ref 13–17)
MCHC RBC-ENTMCNC: 27.7 PG — SIGNIFICANT CHANGE UP (ref 27–34)
MCHC RBC-ENTMCNC: 32.2 GM/DL — SIGNIFICANT CHANGE UP (ref 32–36)
MCV RBC AUTO: 86.2 FL — SIGNIFICANT CHANGE UP (ref 80–100)
NRBC # BLD: 0 /100 WBCS — SIGNIFICANT CHANGE UP (ref 0–0)
NRBC # FLD: 0 K/UL — SIGNIFICANT CHANGE UP (ref 0–0)
PLATELET # BLD AUTO: 615 K/UL — HIGH (ref 150–400)
POTASSIUM SERPL-MCNC: 4.6 MMOL/L — SIGNIFICANT CHANGE UP (ref 3.5–5.3)
POTASSIUM SERPL-SCNC: 4.6 MMOL/L — SIGNIFICANT CHANGE UP (ref 3.5–5.3)
RBC # BLD: 4.65 M/UL — SIGNIFICANT CHANGE UP (ref 4.2–5.8)
RBC # FLD: 12.7 % — SIGNIFICANT CHANGE UP (ref 10.3–14.5)
SODIUM SERPL-SCNC: 135 MMOL/L — SIGNIFICANT CHANGE UP (ref 135–145)
WBC # BLD: 16.7 K/UL — HIGH (ref 3.8–10.5)
WBC # FLD AUTO: 16.7 K/UL — HIGH (ref 3.8–10.5)

## 2024-09-24 RX ORDER — PIPERACILLIN SODIUM AND TAZOBACTAM SODIUM 12; 1.5 G/60ML; G/60ML
3.38 INJECTION, POWDER, LYOPHILIZED, FOR SOLUTION INTRAVENOUS EVERY 8 HOURS
Refills: 0 | Status: DISCONTINUED | OUTPATIENT
Start: 2024-09-24 | End: 2024-09-27

## 2024-09-24 RX ORDER — TRAMADOL HYDROCHLORIDE 50 MG/1
25 TABLET, COATED ORAL ONCE
Refills: 0 | Status: DISCONTINUED | OUTPATIENT
Start: 2024-09-24 | End: 2024-09-24

## 2024-09-24 RX ORDER — ACETAMINOPHEN 325 MG
1000 TABLET ORAL ONCE
Refills: 0 | Status: COMPLETED | OUTPATIENT
Start: 2024-09-24 | End: 2024-09-24

## 2024-09-24 RX ORDER — TRAMADOL HYDROCHLORIDE 50 MG/1
25 TABLET, COATED ORAL EVERY 8 HOURS
Refills: 0 | Status: DISCONTINUED | OUTPATIENT
Start: 2024-09-24 | End: 2024-09-25

## 2024-09-24 RX ORDER — CHLORHEXIDINE GLUCONATE ORAL RINSE 1.2 MG/ML
1 SOLUTION DENTAL DAILY
Refills: 0 | Status: DISCONTINUED | OUTPATIENT
Start: 2024-09-24 | End: 2024-09-27

## 2024-09-24 RX ADMIN — Medication 81 MILLIGRAM(S): at 12:32

## 2024-09-24 RX ADMIN — TRAMADOL HYDROCHLORIDE 25 MILLIGRAM(S): 50 TABLET, COATED ORAL at 10:40

## 2024-09-24 RX ADMIN — Medication 3 UNIT(S): at 09:03

## 2024-09-24 RX ADMIN — Medication 1: at 09:01

## 2024-09-24 RX ADMIN — PIPERACILLIN SODIUM AND TAZOBACTAM SODIUM 25 GRAM(S): 12; 1.5 INJECTION, POWDER, LYOPHILIZED, FOR SOLUTION INTRAVENOUS at 15:07

## 2024-09-24 RX ADMIN — Medication 1: at 22:32

## 2024-09-24 RX ADMIN — Medication 2: at 18:12

## 2024-09-24 RX ADMIN — TRAMADOL HYDROCHLORIDE 25 MILLIGRAM(S): 50 TABLET, COATED ORAL at 19:40

## 2024-09-24 RX ADMIN — ATORVASTATIN CALCIUM 80 MILLIGRAM(S): 10 TABLET, FILM COATED ORAL at 22:25

## 2024-09-24 RX ADMIN — PIPERACILLIN SODIUM AND TAZOBACTAM SODIUM 25 GRAM(S): 12; 1.5 INJECTION, POWDER, LYOPHILIZED, FOR SOLUTION INTRAVENOUS at 22:25

## 2024-09-24 RX ADMIN — TRAMADOL HYDROCHLORIDE 25 MILLIGRAM(S): 50 TABLET, COATED ORAL at 18:40

## 2024-09-24 RX ADMIN — Medication 3 UNIT(S): at 18:11

## 2024-09-24 RX ADMIN — Medication 1000 MILLIGRAM(S): at 22:55

## 2024-09-24 RX ADMIN — INSULIN GLARGINE 25 UNIT(S): 300 INJECTION, SOLUTION SUBCUTANEOUS at 09:03

## 2024-09-24 RX ADMIN — Medication 3 UNIT(S): at 12:55

## 2024-09-24 RX ADMIN — Medication 3: at 12:55

## 2024-09-24 RX ADMIN — Medication 1 APPLICATION(S): at 12:31

## 2024-09-24 RX ADMIN — Medication 400 MILLIGRAM(S): at 22:25

## 2024-09-24 RX ADMIN — TRAMADOL HYDROCHLORIDE 25 MILLIGRAM(S): 50 TABLET, COATED ORAL at 09:40

## 2024-09-24 RX ADMIN — ENOXAPARIN SODIUM 40 MILLIGRAM(S): 150 INJECTION SUBCUTANEOUS at 12:32

## 2024-09-24 NOTE — PROGRESS NOTE ADULT - SUBJECTIVE AND OBJECTIVE BOX
SUBJECTIVE / OVERNIGHT EVENTS:pt seen and examined  09-24-24     MEDICATIONS  (STANDING):  aspirin enteric coated 81 milliGRAM(s) Oral daily  atorvastatin 80 milliGRAM(s) Oral at bedtime  collagenase Ointment 1 Application(s) Topical daily  collagenase Ointment 1 Application(s) Topical daily  Dakins Solution - 1/4 Strength 1 Application(s) Topical daily  dextrose 5%. 1000 milliLiter(s) (50 mL/Hr) IV Continuous <Continuous>  dextrose 5%. 1000 milliLiter(s) (100 mL/Hr) IV Continuous <Continuous>  dextrose 50% Injectable 25 Gram(s) IV Push once  dextrose 50% Injectable 25 Gram(s) IV Push once  dextrose 50% Injectable 12.5 Gram(s) IV Push once  enoxaparin Injectable 40 milliGRAM(s) SubCutaneous every 24 hours  glucagon  Injectable 1 milliGRAM(s) IntraMuscular once  influenza   Vaccine 0.5 milliLiter(s) IntraMuscular once  insulin glargine Injectable (LANTUS) 25 Unit(s) SubCutaneous every morning  insulin lispro (ADMELOG) corrective regimen sliding scale   SubCutaneous three times a day before meals  insulin lispro (ADMELOG) corrective regimen sliding scale   SubCutaneous at bedtime  insulin lispro Injectable (ADMELOG) 3 Unit(s) SubCutaneous three times a day before meals  lidocaine 1% Injectable 20 milliLiter(s) Local Injection once  piperacillin/tazobactam IVPB.. 3.375 Gram(s) IV Intermittent every 8 hours    MEDICATIONS  (PRN):  acetaminophen     Tablet .. 650 milliGRAM(s) Oral every 6 hours PRN Temp greater or equal to 38C (100.4F), Mild Pain (1 - 3)  dextrose Oral Gel 15 Gram(s) Oral once PRN Blood Glucose LESS THAN 70 milliGRAM(s)/deciliter  traMADol 25 milliGRAM(s) Oral every 8 hours PRN Severe Pain (7 - 10)    Vital Signs Last 24 Hrs  T(C): 36.9 (09-24-24 @ 14:13), Max: 37.1 (09-23-24 @ 21:26)  T(F): 98.4 (09-24-24 @ 14:13), Max: 98.8 (09-23-24 @ 21:26)  HR: 86 (09-24-24 @ 14:13) (78 - 90)  BP: 145/66 (09-24-24 @ 14:13) (134/62 - 145/66)  BP(mean): --  RR: 17 (09-24-24 @ 14:13) (17 - 18)  SpO2: 99% (09-24-24 @ 14:13) (95% - 99%)    SpO2: 95% (09-23-24 @ 21:26) (95% - 98%)        Constitutional: No fever, fatigue  Skin: No rash.  Eyes: No recent vision problems or eye pain.  ENT: No congestion, ear pain, or sore throat.  Cardiovascular: No chest pain or palpation.  Respiratory: No cough, shortness of breath, congestion, or wheezing.  Gastrointestinal: No abdominal pain, nausea, vomiting, or diarrhea.  Genitourinary: No dysuria.  Musculoskeletal: No joint swelling.  Neurologic: No headache.    PHYSICAL EXAM:  GENERAL: NAD  EYES: EOMI, PERRLA  NECK: Supple, No JVD  CHEST/LUNG: cta jai  HEART:  S1 , S2 +  ABDOMEN: soft , bs+  EXTREMITIES:  left foot dressing +  wound vac+  NEUROLOGY:alert awake oriented     LABS:  09-24    135  |  97[L]  |  15  ----------------------------<  191[H]  4.6   |  23  |  0.93    Ca    9.8      24 Sep 2024 06:21      Creatinine Trend: 0.93 <--, 0.83 <--, 0.83 <--, 0.90 <--, 1.06 <--, 0.92 <--, 0.92 <--                        12.9   16.70 )-----------( 615      ( 24 Sep 2024 06:21 )             40.1     Urine Studies:  Urinalysis Basic - ( 24 Sep 2024 06:21 )    Color:  / Appearance:  / SG:  / pH:   Gluc: 191 mg/dL / Ketone:   / Bili:  / Urobili:    Blood:  / Protein:  / Nitrite:    Leuk Esterase:  / RBC:  / WBC    Sq Epi:  / Non Sq Epi:  / Bacteria:                                               RADIOLOGY & ADDITIONAL TESTS:    Imaging Personally Reviewed:    Consultant(s) Notes Reviewed:      Care Discussed with Consultants/Other Providers:

## 2024-09-25 LAB
ANION GAP SERPL CALC-SCNC: 9 MMOL/L — SIGNIFICANT CHANGE UP (ref 7–14)
BUN SERPL-MCNC: 15 MG/DL — SIGNIFICANT CHANGE UP (ref 7–23)
CALCIUM SERPL-MCNC: 9.3 MG/DL — SIGNIFICANT CHANGE UP (ref 8.4–10.5)
CHLORIDE SERPL-SCNC: 99 MMOL/L — SIGNIFICANT CHANGE UP (ref 98–107)
CO2 SERPL-SCNC: 25 MMOL/L — SIGNIFICANT CHANGE UP (ref 22–31)
CREAT SERPL-MCNC: 1.01 MG/DL — SIGNIFICANT CHANGE UP (ref 0.5–1.3)
EGFR: 85 ML/MIN/1.73M2 — SIGNIFICANT CHANGE UP
GLUCOSE BLDC GLUCOMTR-MCNC: 228 MG/DL — HIGH (ref 70–99)
GLUCOSE BLDC GLUCOMTR-MCNC: 237 MG/DL — HIGH (ref 70–99)
GLUCOSE BLDC GLUCOMTR-MCNC: 270 MG/DL — HIGH (ref 70–99)
GLUCOSE BLDC GLUCOMTR-MCNC: 301 MG/DL — HIGH (ref 70–99)
GLUCOSE SERPL-MCNC: 210 MG/DL — HIGH (ref 70–99)
HCT VFR BLD CALC: 36.7 % — LOW (ref 39–50)
HGB BLD-MCNC: 12.1 G/DL — LOW (ref 13–17)
MAGNESIUM SERPL-MCNC: 2.4 MG/DL — SIGNIFICANT CHANGE UP (ref 1.6–2.6)
MCHC RBC-ENTMCNC: 28 PG — SIGNIFICANT CHANGE UP (ref 27–34)
MCHC RBC-ENTMCNC: 33 GM/DL — SIGNIFICANT CHANGE UP (ref 32–36)
MCV RBC AUTO: 85 FL — SIGNIFICANT CHANGE UP (ref 80–100)
NRBC # BLD: 0 /100 WBCS — SIGNIFICANT CHANGE UP (ref 0–0)
NRBC # FLD: 0 K/UL — SIGNIFICANT CHANGE UP (ref 0–0)
PHOSPHATE SERPL-MCNC: 3.3 MG/DL — SIGNIFICANT CHANGE UP (ref 2.5–4.5)
PLATELET # BLD AUTO: 590 K/UL — HIGH (ref 150–400)
POTASSIUM SERPL-MCNC: 4.8 MMOL/L — SIGNIFICANT CHANGE UP (ref 3.5–5.3)
POTASSIUM SERPL-SCNC: 4.8 MMOL/L — SIGNIFICANT CHANGE UP (ref 3.5–5.3)
RBC # BLD: 4.32 M/UL — SIGNIFICANT CHANGE UP (ref 4.2–5.8)
RBC # FLD: 12.4 % — SIGNIFICANT CHANGE UP (ref 10.3–14.5)
SODIUM SERPL-SCNC: 133 MMOL/L — LOW (ref 135–145)
WBC # BLD: 14.49 K/UL — HIGH (ref 3.8–10.5)
WBC # FLD AUTO: 14.49 K/UL — HIGH (ref 3.8–10.5)

## 2024-09-25 RX ORDER — MORPHINE SULFATE 30 MG/1
1 TABLET, FILM COATED, EXTENDED RELEASE ORAL ONCE
Refills: 0 | Status: DISCONTINUED | OUTPATIENT
Start: 2024-09-25 | End: 2024-09-25

## 2024-09-25 RX ORDER — TRAMADOL HYDROCHLORIDE 50 MG/1
25 TABLET, COATED ORAL EVERY 6 HOURS
Refills: 0 | Status: DISCONTINUED | OUTPATIENT
Start: 2024-09-25 | End: 2024-09-27

## 2024-09-25 RX ORDER — TRAMADOL HYDROCHLORIDE 50 MG/1
50 TABLET, COATED ORAL EVERY 6 HOURS
Refills: 0 | Status: DISCONTINUED | OUTPATIENT
Start: 2024-09-25 | End: 2024-09-27

## 2024-09-25 RX ADMIN — Medication 3: at 18:12

## 2024-09-25 RX ADMIN — Medication 2: at 09:48

## 2024-09-25 RX ADMIN — Medication 3 UNIT(S): at 09:49

## 2024-09-25 RX ADMIN — ENOXAPARIN SODIUM 40 MILLIGRAM(S): 150 INJECTION SUBCUTANEOUS at 13:04

## 2024-09-25 RX ADMIN — TRAMADOL HYDROCHLORIDE 50 MILLIGRAM(S): 50 TABLET, COATED ORAL at 20:00

## 2024-09-25 RX ADMIN — ATORVASTATIN CALCIUM 80 MILLIGRAM(S): 10 TABLET, FILM COATED ORAL at 21:49

## 2024-09-25 RX ADMIN — PIPERACILLIN SODIUM AND TAZOBACTAM SODIUM 25 GRAM(S): 12; 1.5 INJECTION, POWDER, LYOPHILIZED, FOR SOLUTION INTRAVENOUS at 21:49

## 2024-09-25 RX ADMIN — Medication 3 UNIT(S): at 18:12

## 2024-09-25 RX ADMIN — Medication 1 APPLICATION(S): at 13:07

## 2024-09-25 RX ADMIN — Medication 81 MILLIGRAM(S): at 12:44

## 2024-09-25 RX ADMIN — Medication 2: at 22:54

## 2024-09-25 RX ADMIN — CHLORHEXIDINE GLUCONATE ORAL RINSE 1 APPLICATION(S): 1.2 SOLUTION DENTAL at 12:50

## 2024-09-25 RX ADMIN — Medication 2: at 12:45

## 2024-09-25 RX ADMIN — MORPHINE SULFATE 1 MILLIGRAM(S): 30 TABLET, FILM COATED, EXTENDED RELEASE ORAL at 13:02

## 2024-09-25 RX ADMIN — TRAMADOL HYDROCHLORIDE 25 MILLIGRAM(S): 50 TABLET, COATED ORAL at 10:30

## 2024-09-25 RX ADMIN — Medication 1 APPLICATION(S): at 13:08

## 2024-09-25 RX ADMIN — TRAMADOL HYDROCHLORIDE 50 MILLIGRAM(S): 50 TABLET, COATED ORAL at 18:23

## 2024-09-25 RX ADMIN — TRAMADOL HYDROCHLORIDE 25 MILLIGRAM(S): 50 TABLET, COATED ORAL at 09:30

## 2024-09-25 RX ADMIN — INSULIN GLARGINE 25 UNIT(S): 300 INJECTION, SOLUTION SUBCUTANEOUS at 09:34

## 2024-09-25 RX ADMIN — Medication 3 UNIT(S): at 12:46

## 2024-09-25 RX ADMIN — PIPERACILLIN SODIUM AND TAZOBACTAM SODIUM 25 GRAM(S): 12; 1.5 INJECTION, POWDER, LYOPHILIZED, FOR SOLUTION INTRAVENOUS at 13:12

## 2024-09-25 RX ADMIN — MORPHINE SULFATE 1 MILLIGRAM(S): 30 TABLET, FILM COATED, EXTENDED RELEASE ORAL at 14:02

## 2024-09-25 RX ADMIN — PIPERACILLIN SODIUM AND TAZOBACTAM SODIUM 25 GRAM(S): 12; 1.5 INJECTION, POWDER, LYOPHILIZED, FOR SOLUTION INTRAVENOUS at 06:03

## 2024-09-25 NOTE — PROGRESS NOTE ADULT - SUBJECTIVE AND OBJECTIVE BOX
SUBJECTIVE / OVERNIGHT EVENTS:pt seen and examined  09-25-24     MEDICATIONS  (STANDING):  aspirin enteric coated 81 milliGRAM(s) Oral daily  atorvastatin 80 milliGRAM(s) Oral at bedtime  chlorhexidine 2% Cloths 1 Application(s) Topical daily  collagenase Ointment 1 Application(s) Topical daily  collagenase Ointment 1 Application(s) Topical daily  Dakins Solution - 1/4 Strength 1 Application(s) Topical daily  dextrose 5%. 1000 milliLiter(s) (100 mL/Hr) IV Continuous <Continuous>  dextrose 5%. 1000 milliLiter(s) (50 mL/Hr) IV Continuous <Continuous>  dextrose 50% Injectable 25 Gram(s) IV Push once  dextrose 50% Injectable 12.5 Gram(s) IV Push once  dextrose 50% Injectable 25 Gram(s) IV Push once  enoxaparin Injectable 40 milliGRAM(s) SubCutaneous every 24 hours  glucagon  Injectable 1 milliGRAM(s) IntraMuscular once  influenza   Vaccine 0.5 milliLiter(s) IntraMuscular once  insulin glargine Injectable (LANTUS) 25 Unit(s) SubCutaneous every morning  insulin lispro (ADMELOG) corrective regimen sliding scale   SubCutaneous three times a day before meals  insulin lispro (ADMELOG) corrective regimen sliding scale   SubCutaneous at bedtime  insulin lispro Injectable (ADMELOG) 3 Unit(s) SubCutaneous three times a day before meals  lidocaine 1% Injectable 20 milliLiter(s) Local Injection once  piperacillin/tazobactam IVPB.. 3.375 Gram(s) IV Intermittent every 8 hours    MEDICATIONS  (PRN):  acetaminophen     Tablet .. 650 milliGRAM(s) Oral every 6 hours PRN Temp greater or equal to 38C (100.4F), Mild Pain (1 - 3)  dextrose Oral Gel 15 Gram(s) Oral once PRN Blood Glucose LESS THAN 70 milliGRAM(s)/deciliter  traMADol 50 milliGRAM(s) Oral every 6 hours PRN Severe Pain (7 - 10)  traMADol 25 milliGRAM(s) Oral every 6 hours PRN Moderate Pain (4 - 6)    Vital Signs Last 24 Hrs  T(C): 36.8 (09-25-24 @ 12:27), Max: 37.3 (09-24-24 @ 20:55)  T(F): 98.2 (09-25-24 @ 12:27), Max: 99.1 (09-24-24 @ 20:55)  HR: 83 (09-25-24 @ 12:27) (78 - 85)  BP: 131/66 (09-25-24 @ 12:27) (123/61 - 135/71)  BP(mean): --  RR: 17 (09-25-24 @ 12:27) (17 - 18)  SpO2: 97% (09-25-24 @ 12:27) (97% - 99%)        Constitutional: No fever, fatigue  Skin: No rash.  Eyes: No recent vision problems or eye pain.  ENT: No congestion, ear pain, or sore throat.  Cardiovascular: No chest pain or palpation.  Respiratory: No cough, shortness of breath, congestion, or wheezing.  Gastrointestinal: No abdominal pain, nausea, vomiting, or diarrhea.  Genitourinary: No dysuria.  Musculoskeletal: No joint swelling.  Neurologic: No headache.    PHYSICAL EXAM:  GENERAL: NAD  EYES: EOMI, PERRLA  NECK: Supple, No JVD  CHEST/LUNG: cta jai  HEART:  S1 , S2 +  ABDOMEN: soft , bs+  EXTREMITIES:  left foot dressing +  wound vac+  NEUROLOGY:alert awake oriented     LABS:  09-24    135  |  97[L]  |  15  ----------------------------<  191[H]  4.6   |  23  |  0.93    Ca    9.8      24 Sep 2024 06:21      Creatinine Trend: 0.93 <--, 0.83 <--, 0.83 <--, 0.90 <--, 1.06 <--, 0.92 <--, 0.92 <--                        12.9   16.70 )-----------( 615      ( 24 Sep 2024 06:21 )             40.1     Urine Studies:  Urinalysis Basic - ( 24 Sep 2024 06:21 )    Color:  / Appearance:  / SG:  / pH:   Gluc: 191 mg/dL / Ketone:   / Bili:  / Urobili:    Blood:  / Protein:  / Nitrite:    Leuk Esterase:  / RBC:  / WBC    Sq Epi:  / Non Sq Epi:  / Bacteria:                                               RADIOLOGY & ADDITIONAL TESTS:    Imaging Personally Reviewed:    Consultant(s) Notes Reviewed:      Care Discussed with Consultants/Other Providers:

## 2024-09-25 NOTE — PROGRESS NOTE ADULT - SUBJECTIVE AND OBJECTIVE BOX
Patient is a 59y old  Male who presents with a chief complaint of Left foot wound infection (24 Sep 2024 09:22)       INTERVAL HPI/OVERNIGHT EVENTS:  Patient seen and evaluated at bedside.  Pt is resting comfortable in NAD. Denies N/V/F/C.  Pain rated at X/10    Allergies    No Known Allergies    Intolerances        Vital Signs Last 24 Hrs  T(C): 36.8 (25 Sep 2024 12:27), Max: 37.3 (24 Sep 2024 20:55)  T(F): 98.2 (25 Sep 2024 12:27), Max: 99.1 (24 Sep 2024 20:55)  HR: 83 (25 Sep 2024 12:27) (78 - 86)  BP: 131/66 (25 Sep 2024 12:27) (123/61 - 145/66)  BP(mean): --  RR: 17 (25 Sep 2024 12:27) (17 - 18)  SpO2: 97% (25 Sep 2024 12:27) (97% - 99%)    Parameters below as of 25 Sep 2024 12:27  Patient On (Oxygen Delivery Method): room air        LABS:                        12.1   14.49 )-----------( 590      ( 25 Sep 2024 05:50 )             36.7     09-25    133[L]  |  99  |  15  ----------------------------<  210[H]  4.8   |  25  |  1.01    Ca    9.3      25 Sep 2024 05:50  Phos  3.3     09-25  Mg     2.40     09-25        Urinalysis Basic - ( 25 Sep 2024 05:50 )    Color: x / Appearance: x / SG: x / pH: x  Gluc: 210 mg/dL / Ketone: x  / Bili: x / Urobili: x   Blood: x / Protein: x / Nitrite: x   Leuk Esterase: x / RBC: x / WBC x   Sq Epi: x / Non Sq Epi: x / Bacteria: x      CAPILLARY BLOOD GLUCOSE      POCT Blood Glucose.: 237 mg/dL (25 Sep 2024 12:39)  POCT Blood Glucose.: 228 mg/dL (25 Sep 2024 09:00)  POCT Blood Glucose.: 272 mg/dL (24 Sep 2024 22:17)  POCT Blood Glucose.: 216 mg/dL (24 Sep 2024 18:08)      Lower Extremity Physical Exam:  Vascular: DP/PT 1/4, B/L, CFT <3 seconds B/L, Temperature gradient warm to warm, left, warm to cool, right  Neuro: Epicritic sensation intact to the level of digits, B/L.  Musculoskeletal/Ortho: unremarkable  Skin: 9/20 s/p bedside left foot hallux disarticulation, open: no purulence, mild malodor, no drainage, bleeding controlled. Left foot medial 2nd digit necrotic tissue, no purulence. Right foot no open wounds, no signs of infection.    RADIOLOGY & ADDITIONAL TESTS:

## 2024-09-25 NOTE — PROGRESS NOTE ADULT - SUBJECTIVE AND OBJECTIVE BOX
PATIENT SEEN AND EXAMINED BY SEAN JUAREZ M.D. ON :- 9/25/24  DATE OF SERVICE:   9/25/24          Interim events noted,Labs ,Radiological studies and Cardiology tests reviewed .    Patient is a 59y old  Male who presents with a chief complaint of Left foot wound infection (25 Sep 2024 13:50)      HPI:  58 yo man with history of type 2 DM (on insulin) and CVA (~2 years ago) with residual left-sided weakness and numbness presents with increasing redness and swelling of his left foot and leg. Pt was admitted to [facility] on 9/12/24 for a left foot foreign body puncture wound. Xray of the left foot revealed soft tissue infection of the hallux and interdigit space centered around the foreign body with edema and focal gas tracking, highly suspicious for an underlying abscess. Pt was seen by both podiatry and vascular surgery during that admission and had the foreign body, a tiny piece of metal, removed by podiatry. When podiatry attempted to perform a bedside I&D to release the localized gas seen on Xray, pt refused the I&D, stating that he would only want to stay in the hospital for 24 hours for IV antibiotics but would return next week for additional treatment. Pt subsequently left [place] the same day he was admitted, being sent home on Augmentin. The day after pt returned home from [facility], pt noticed that he was having redness and swelling around the left foot wound site that then progressively worsened over the weekend. Pt also had subjective fever for the past couple days and chills upon arrival to the ED. Pt reports that he was taking the Augmentin as prescribed after he left the hospital. Pt denies any chest pain, shortness of breath, palpitations, abdominal pain, nausea, vomiting, diarrhea, constipation, melena, BRBPR, or urinary symptoms.    In the ED,  T 97.3-100F oral, HR , -154/59-77, RR 18, SpO2 % RA.  WBC 21.93. ESR 93, .5.  Got IV Vanc, Zosyn, and Clinda.  (16 Sep 2024 08:13)      PAST MEDICAL & SURGICAL HISTORY:  CVA (cerebrovascular accident)      Type 2 diabetes mellitus treated with insulin      No significant past surgical history          PREVIOUS DIAGNOSTIC TESTING:      ECHO  FINDINGS:    STRESS  FINDINGS:    CATHETERIZATION  FINDINGS:    MEDICATIONS  (STANDING):  aspirin enteric coated 81 milliGRAM(s) Oral daily  atorvastatin 80 milliGRAM(s) Oral at bedtime  chlorhexidine 2% Cloths 1 Application(s) Topical daily  clopidogrel Tablet 75 milliGRAM(s) Oral daily  collagenase Ointment 1 Application(s) Topical daily  collagenase Ointment 1 Application(s) Topical daily  Dakins Solution - 1/4 Strength 1 Application(s) Topical daily  dextrose 5%. 1000 milliLiter(s) (50 mL/Hr) IV Continuous <Continuous>  dextrose 5%. 1000 milliLiter(s) (100 mL/Hr) IV Continuous <Continuous>  dextrose 50% Injectable 25 Gram(s) IV Push once  dextrose 50% Injectable 12.5 Gram(s) IV Push once  dextrose 50% Injectable 25 Gram(s) IV Push once  enoxaparin Injectable 40 milliGRAM(s) SubCutaneous every 24 hours  glucagon  Injectable 1 milliGRAM(s) IntraMuscular once  influenza   Vaccine 0.5 milliLiter(s) IntraMuscular once  insulin glargine Injectable (LANTUS) 25 Unit(s) SubCutaneous every morning  insulin lispro (ADMELOG) corrective regimen sliding scale   SubCutaneous three times a day before meals  insulin lispro (ADMELOG) corrective regimen sliding scale   SubCutaneous at bedtime  insulin lispro Injectable (ADMELOG) 3 Unit(s) SubCutaneous three times a day before meals  lidocaine 1% Injectable 20 milliLiter(s) Local Injection once  piperacillin/tazobactam IVPB.. 3.375 Gram(s) IV Intermittent every 8 hours    MEDICATIONS  (PRN):  acetaminophen     Tablet .. 650 milliGRAM(s) Oral every 6 hours PRN Temp greater or equal to 38C (100.4F), Mild Pain (1 - 3)  dextrose Oral Gel 15 Gram(s) Oral once PRN Blood Glucose LESS THAN 70 milliGRAM(s)/deciliter  traMADol 50 milliGRAM(s) Oral every 6 hours PRN Severe Pain (7 - 10)  traMADol 25 milliGRAM(s) Oral every 6 hours PRN Moderate Pain (4 - 6)      FAMILY HISTORY:  No pertinent family history in first degree relatives        SOCIAL HISTORY:    CIGARETTES:    ALCOHOL:    REVIEW OF SYSTEMS:  CONSTITUTIONAL: No fever, weight loss, or fatigue  EYES: No eye pain, visual disturbances, or discharge  ENMT:  No difficulty hearing, tinnitus, vertigo; No sinus or throat pain  NECK: No pain or stiffness  RESPIRATORY: No cough, wheezing, chills or hemoptysis; No shortness of breath  CARDIOVASCULAR: No chest pain, palpitations, dizziness, or leg swelling  GASTROINTESTINAL: No abdominal or epigastric pain. No nausea, vomiting, or hematemesis; No diarrhea or constipation. No melena or hematochezia.  GENITOURINARY: No dysuria, frequency, hematuria, or incontinence  NEUROLOGICAL: No headaches, memory loss, loss of strength, numbness, or tremors  SKIN: No itching, burning, rashes, or lesions   LYMPH NODES: No enlarged glands  ENDOCRINE: No heat or cold intolerance; No hair loss  MUSCULOSKELETAL: No joint pain or swelling; No muscle, back, or extremity pain  PSYCHIATRIC: No depression, anxiety, mood swings, or difficulty sleeping  HEME/LYMPH: No easy bruising, or bleeding gums  ALLERY AND IMMUNOLOGIC: No hives or eczema    Vital Signs Last 24 Hrs  T(C): 36.8 (25 Sep 2024 21:45), Max: 37.1 (25 Sep 2024 17:35)  T(F): 98.3 (25 Sep 2024 21:45), Max: 98.7 (25 Sep 2024 17:35)  HR: 97 (25 Sep 2024 21:45) (78 - 97)  BP: 139/74 (25 Sep 2024 21:45) (128/72 - 146/71)  BP(mean): --  RR: 18 (25 Sep 2024 21:45) (17 - 18)  SpO2: 96% (25 Sep 2024 21:45) (96% - 99%)    Parameters below as of 25 Sep 2024 21:45  Patient On (Oxygen Delivery Method): room air          PHYSICAL EXAM:  GENERAL: NAD, well-groomed, well-developed  HEAD:  Atraumatic, Normocephalic  EYES: EOMI, PERRLA, conjunctiva and sclera clear  ENMT: No tonsillar erythema, exudates, or enlargement; Moist mucous membranes, Good dentition, No lesions  NECK: Supple, No JVD, Normal thyroid  NERVOUS SYSTEM:  Alert & Oriented X3, Good concentration; Motor Strength 5/5 B/L upper and lower extremities; DTRs 2+ intact and symmetric  CHEST/LUNG: Clear to percussion bilaterally; No rales, rhonchi, wheezing, or rubs  HEART: Regular rate and rhythm; No murmurs, rubs, or gallops  ABDOMEN: Soft, Nontender, Nondistended; Bowel sounds present  EXTREMITIES:  2+ Peripheral Pulses, No clubbing, cyanosis, or edema  LYMPH: No lymphadenopathy noted  SKIN: No rashes or lesions      INTERPRETATION OF TELEMETRY:    ECG:    ELOISA:     LABS:                        12.1   14.49 )-----------( 590      ( 25 Sep 2024 05:50 )             36.7     09-25    133[L]  |  99  |  15  ----------------------------<  210[H]  4.8   |  25  |  1.01    Ca    9.3      25 Sep 2024 05:50  Phos  3.3     09-25  Mg     2.40     09-25            Urinalysis Basic - ( 25 Sep 2024 05:50 )    Color: x / Appearance: x / SG: x / pH: x  Gluc: 210 mg/dL / Ketone: x  / Bili: x / Urobili: x   Blood: x / Protein: x / Nitrite: x   Leuk Esterase: x / RBC: x / WBC x   Sq Epi: x / Non Sq Epi: x / Bacteria: x      Lipid Panel:   I&O's Summary      RADIOLOGY & ADDITIONAL STUDIES:    CONCLUSIONS:  1. Normal left ventricular internal dimensions and wall  thicknesses.  2. Normal left ventricular systolic function. No segmental  wall motion abnormalities.  3. Mild diastolic dysfunction (Stage I).  4. Normal right ventricular size and function.  5. Agitated saline injection demonstrates no evidence of a  patent foramen ovale.  *** No previousEcho exam.  ------------------------------------------------------------------------

## 2024-09-25 NOTE — PROGRESS NOTE ADULT - ASSESSMENT
59M presents for 9/20 s/p bedside left foot hallux disarticulation, open.  - Pt was seen and evaluated  - Afebrile, WBC 14.49  - 9/20 s/p bedside left foot hallux disarticulation, open: no purulence, mild malodor, no drainage, bleeding controlled. Left foot medial 2nd digit medial necrotic tissue, no purulence. Right foot no open wounds, no signs of infection.  - Left foot X-ray: 1st interspace soft tissue emphysema, no OM   - Left foot MRI: OM of the distal phalanx of the hallux  - Left foot wound culture: proteus vulgaris, klebsiella pneumonia, aeromonas hydrophila, staph lugdunesis (prelim)  - ID recs appreciated   - Pt states he would like to think about it for a few days and observe the appearance of his wound.  Discussed with pt and wife bedside that pt requires amputation of the second digit and likely requires a transmetatarsal amputation for proper source control of infection. Pt and wife are refusing at the moment and would prefer to attempt wound vac at this time and will not consent to surgical amputation. Pt continues to refuse all podiatric recommendations this admission. Explained to pt and family risk of worsening infection, sepsis, loss of limb and loss of life. Pt demonstrated understanding and still adamantly refuses podiatric surgical intervention.   - OK to apply santyl VAC today 9/25  to left foot   - Pod plan local wound care pending appearance and WBC   - Seen with attending  59M presents for 9/20 s/p bedside left foot hallux disarticulation, open.  - Pt was seen and evaluated  - Afebrile, WBC 14.49  - 9/20 s/p bedside left foot hallux disarticulation, open: no purulence, mild malodor, no drainage, bleeding controlled. Left foot medial 2nd digit medial necrotic tissue, no purulence. Right foot no open wounds, no signs of infection.  - Left foot X-ray: 1st interspace soft tissue emphysema, no OM   - Left foot MRI: OM of the distal phalanx of the hallux  - Left foot wound culture: proteus vulgaris, klebsiella pneumonia, aeromonas hydrophila, staph lugdunesis (prelim)  - ID recs appreciated   - Pt states he would like to observe the appearance of his 2nd toe and will reconsider surgery at another time.  Discussed with pt and wife bedside that pt requires amputation of the second digit and likely requires a transmetatarsal amputation for proper source control of infection. Pt and wife are refusing at the moment and would prefer to attempt wound vac at this time and will not consent to surgical amputation. Pt continues to refuse all podiatric recommendations this admission. Explained to pt and family risk of worsening infection, sepsis, loss of limb and loss of life. Pt demonstrated understanding and still adamantly refuses podiatric surgical intervention.   - OK to apply santyl VAC today 9/25  to left foot   - Patient is stable for discharge from a podiatry standpoint pending WBC.   - Wound care orders and followup information can be found in discharge paperwork.   - Seen with attending

## 2024-09-26 ENCOUNTER — TRANSCRIPTION ENCOUNTER (OUTPATIENT)
Age: 59
End: 2024-09-26

## 2024-09-26 LAB
ANION GAP SERPL CALC-SCNC: 12 MMOL/L — SIGNIFICANT CHANGE UP (ref 7–14)
BUN SERPL-MCNC: 14 MG/DL — SIGNIFICANT CHANGE UP (ref 7–23)
CALCIUM SERPL-MCNC: 9 MG/DL — SIGNIFICANT CHANGE UP (ref 8.4–10.5)
CHLORIDE SERPL-SCNC: 98 MMOL/L — SIGNIFICANT CHANGE UP (ref 98–107)
CO2 SERPL-SCNC: 22 MMOL/L — SIGNIFICANT CHANGE UP (ref 22–31)
CREAT SERPL-MCNC: 0.89 MG/DL — SIGNIFICANT CHANGE UP (ref 0.5–1.3)
EGFR: 98 ML/MIN/1.73M2 — SIGNIFICANT CHANGE UP
GLUCOSE BLDC GLUCOMTR-MCNC: 211 MG/DL — HIGH (ref 70–99)
GLUCOSE BLDC GLUCOMTR-MCNC: 219 MG/DL — HIGH (ref 70–99)
GLUCOSE BLDC GLUCOMTR-MCNC: 227 MG/DL — HIGH (ref 70–99)
GLUCOSE BLDC GLUCOMTR-MCNC: 230 MG/DL — HIGH (ref 70–99)
GLUCOSE SERPL-MCNC: 226 MG/DL — HIGH (ref 70–99)
HCT VFR BLD CALC: 38.1 % — LOW (ref 39–50)
HGB BLD-MCNC: 12.8 G/DL — LOW (ref 13–17)
MAGNESIUM SERPL-MCNC: 2.3 MG/DL — SIGNIFICANT CHANGE UP (ref 1.6–2.6)
MCHC RBC-ENTMCNC: 28.3 PG — SIGNIFICANT CHANGE UP (ref 27–34)
MCHC RBC-ENTMCNC: 33.6 GM/DL — SIGNIFICANT CHANGE UP (ref 32–36)
MCV RBC AUTO: 84.1 FL — SIGNIFICANT CHANGE UP (ref 80–100)
NRBC # BLD: 0 /100 WBCS — SIGNIFICANT CHANGE UP (ref 0–0)
NRBC # FLD: 0 K/UL — SIGNIFICANT CHANGE UP (ref 0–0)
PHOSPHATE SERPL-MCNC: 3.1 MG/DL — SIGNIFICANT CHANGE UP (ref 2.5–4.5)
PLATELET # BLD AUTO: 580 K/UL — HIGH (ref 150–400)
POTASSIUM SERPL-MCNC: 4.3 MMOL/L — SIGNIFICANT CHANGE UP (ref 3.5–5.3)
POTASSIUM SERPL-SCNC: 4.3 MMOL/L — SIGNIFICANT CHANGE UP (ref 3.5–5.3)
RBC # BLD: 4.53 M/UL — SIGNIFICANT CHANGE UP (ref 4.2–5.8)
RBC # FLD: 12.5 % — SIGNIFICANT CHANGE UP (ref 10.3–14.5)
SODIUM SERPL-SCNC: 132 MMOL/L — LOW (ref 135–145)
WBC # BLD: 13.09 K/UL — HIGH (ref 3.8–10.5)
WBC # FLD AUTO: 13.09 K/UL — HIGH (ref 3.8–10.5)

## 2024-09-26 RX ORDER — SODIUM HYPOCHLORITE 0.057 %
1 GEL (GRAM) TOPICAL DAILY
Refills: 0 | Status: DISCONTINUED | OUTPATIENT
Start: 2024-09-26 | End: 2024-09-27

## 2024-09-26 RX ORDER — ENOXAPARIN SODIUM 150 MG/ML
40 INJECTION SUBCUTANEOUS EVERY 24 HOURS
Refills: 0 | Status: DISCONTINUED | OUTPATIENT
Start: 2024-09-26 | End: 2024-09-27

## 2024-09-26 RX ADMIN — Medication 2: at 13:14

## 2024-09-26 RX ADMIN — Medication 2: at 18:07

## 2024-09-26 RX ADMIN — Medication 1 APPLICATION(S): at 13:13

## 2024-09-26 RX ADMIN — PIPERACILLIN SODIUM AND TAZOBACTAM SODIUM 25 GRAM(S): 12; 1.5 INJECTION, POWDER, LYOPHILIZED, FOR SOLUTION INTRAVENOUS at 21:43

## 2024-09-26 RX ADMIN — Medication 81 MILLIGRAM(S): at 13:12

## 2024-09-26 RX ADMIN — Medication 3 UNIT(S): at 09:01

## 2024-09-26 RX ADMIN — ENOXAPARIN SODIUM 40 MILLIGRAM(S): 150 INJECTION SUBCUTANEOUS at 18:08

## 2024-09-26 RX ADMIN — Medication 2: at 09:01

## 2024-09-26 RX ADMIN — Medication 3 UNIT(S): at 18:08

## 2024-09-26 RX ADMIN — TRAMADOL HYDROCHLORIDE 50 MILLIGRAM(S): 50 TABLET, COATED ORAL at 06:05

## 2024-09-26 RX ADMIN — Medication 3 UNIT(S): at 13:14

## 2024-09-26 RX ADMIN — INSULIN GLARGINE 25 UNIT(S): 300 INJECTION, SOLUTION SUBCUTANEOUS at 09:02

## 2024-09-26 RX ADMIN — TRAMADOL HYDROCHLORIDE 50 MILLIGRAM(S): 50 TABLET, COATED ORAL at 23:00

## 2024-09-26 RX ADMIN — TRAMADOL HYDROCHLORIDE 50 MILLIGRAM(S): 50 TABLET, COATED ORAL at 13:12

## 2024-09-26 RX ADMIN — PIPERACILLIN SODIUM AND TAZOBACTAM SODIUM 25 GRAM(S): 12; 1.5 INJECTION, POWDER, LYOPHILIZED, FOR SOLUTION INTRAVENOUS at 14:51

## 2024-09-26 RX ADMIN — ATORVASTATIN CALCIUM 80 MILLIGRAM(S): 10 TABLET, FILM COATED ORAL at 21:42

## 2024-09-26 RX ADMIN — TRAMADOL HYDROCHLORIDE 50 MILLIGRAM(S): 50 TABLET, COATED ORAL at 14:10

## 2024-09-26 RX ADMIN — TRAMADOL HYDROCHLORIDE 50 MILLIGRAM(S): 50 TABLET, COATED ORAL at 21:41

## 2024-09-26 RX ADMIN — PIPERACILLIN SODIUM AND TAZOBACTAM SODIUM 25 GRAM(S): 12; 1.5 INJECTION, POWDER, LYOPHILIZED, FOR SOLUTION INTRAVENOUS at 05:09

## 2024-09-26 RX ADMIN — Medication 75 MILLIGRAM(S): at 14:51

## 2024-09-26 RX ADMIN — CHLORHEXIDINE GLUCONATE ORAL RINSE 1 APPLICATION(S): 1.2 SOLUTION DENTAL at 13:17

## 2024-09-26 RX ADMIN — TRAMADOL HYDROCHLORIDE 50 MILLIGRAM(S): 50 TABLET, COATED ORAL at 05:09

## 2024-09-26 NOTE — PROGRESS NOTE ADULT - PROBLEM SELECTOR PROBLEM 2
Puncture wound of plantar aspect of left foot with infection

## 2024-09-26 NOTE — PROGRESS NOTE ADULT - TIME BILLING
- Review of records, telemetry, vital signs and daily labs.   - General and cardiovascular physical examination.  - Generation of cardiovascular treatment plan and completion of note .  - Coordination of care.      Patient was seen and examined by me on  9/25/24,interim events noted,labs and radiology studies reviewed.  Kevin Guthrie MD,FACC.  [id number] [id number].  [phone]
- Review of records, telemetry, vital signs and daily labs.   - General and cardiovascular physical examination.  - Generation of cardiovascular treatment plan and completion of note .  - Coordination of care.      Patient was seen and examined by me on  9/26/24,interim events noted,labs and radiology studies reviewed.  Kevin Guthrie MD,FACC.  [id number] [id number].  [phone]

## 2024-09-26 NOTE — PROGRESS NOTE ADULT - PROBLEM SELECTOR PLAN 1
abx as per ID
as per ID , C/w Vanco, f/u level, goal -600. Zosyn 3.375 g q8 via extended infusion    2. F/u BCx, wound Cx, Gram stain with GNR, GPC in pairs. Hope to stop vanco if no MRSA   3. Close podiatry f/u, if any other intervention needed  4. F/u MRI of L foot given c/f possible OM
as per ID , C/w Vanco, f/u level, goal -600. Zosyn 3.375 g q8 via extended infusion    2. F/u BCx, wound Cx, Gram stain with GNR, GPC in pairs. Hope to stop vanco if no MRSA   3. Close podiatry f/u, if any other intervention needed  4. F/u MRI of L foot given c/f possible OM
abx as per ID
abx as per ID
as per ID , C/w Vanco, f/u level, goal -600. Zosyn 3.375 g q8 via extended infusion    2. F/u BCx, wound Cx, Gram stain with GNR, GPC in pairs. Hope to stop vanco if no MRSA   3. Close podiatry f/u, if any other intervention needed  4. F/u MRI of L foot given c/f possible OM
abx as per ID
as per ID , C/w Vanco, f/u level, goal -600. Zosyn 3.375 g q8 via extended infusion    2. F/u BCx, wound Cx, Gram stain with GNR, GPC in pairs. Hope to stop vanco if no MRSA   3. Close podiatry f/u, if any other intervention needed  4. F/u MRI of L foot given c/f possible OM
abx as per ID

## 2024-09-26 NOTE — DISCHARGE NOTE NURSING/CASE MANAGEMENT/SOCIAL WORK - PATIENT PORTAL LINK FT
You can access the FollowMyHealth Patient Portal offered by [facility] by registering at the following website: [email or web address]. By joining [facility]’s FollowMyHealth portal, you will also be able to view your health information using other applications (apps) compatible with our system.

## 2024-09-26 NOTE — PROGRESS NOTE ADULT - PROBLEM SELECTOR PLAN 7
Pt on Novolin 70/30 30 units for breakfast and 15 units for dinner at home.  iss  titrate insulin for optimal blood sugar control

## 2024-09-26 NOTE — PROGRESS NOTE ADULT - PROBLEM SELECTOR PLAN 9
- DVT ppx: Lovenox SQ 40 mg daily  - Diet: CC/DASH/TLC

## 2024-09-26 NOTE — PROGRESS NOTE ADULT - SUBJECTIVE AND OBJECTIVE BOX
PATIENT SEEN AND EXAMINED BY SEAN JUAREZ M.D. ON :- 9/26/24  DATE OF SERVICE:   9/26/24          Interim events noted,Labs ,Radiological studies and Cardiology tests reviewed .    Patient is a 59y old  Male who presents with a chief complaint of Left foot wound infection (25 Sep 2024 13:50)      HPI:  58 yo man with history of type 2 DM (on insulin) and CVA (~2 years ago) with residual left-sided weakness and numbness presents with increasing redness and swelling of his left foot and leg. Pt was admitted to [facility] on 9/12/24 for a left foot foreign body puncture wound. Xray of the left foot revealed soft tissue infection of the hallux and interdigit space centered around the foreign body with edema and focal gas tracking, highly suspicious for an underlying abscess. Pt was seen by both podiatry and vascular surgery during that admission and had the foreign body, a tiny piece of metal, removed by podiatry. When podiatry attempted to perform a bedside I&D to release the localized gas seen on Xray, pt refused the I&D, stating that he would only want to stay in the hospital for 24 hours for IV antibiotics but would return next week for additional treatment. Pt subsequently left [place] the same day he was admitted, being sent home on Augmentin. The day after pt returned home from [facility], pt noticed that he was having redness and swelling around the left foot wound site that then progressively worsened over the weekend. Pt also had subjective fever for the past couple days and chills upon arrival to the ED. Pt reports that he was taking the Augmentin as prescribed after he left the hospital. Pt denies any chest pain, shortness of breath, palpitations, abdominal pain, nausea, vomiting, diarrhea, constipation, melena, BRBPR, or urinary symptoms.    In the ED,  T 97.3-100F oral, HR , -154/59-77, RR 18, SpO2 % RA.  WBC 21.93. ESR 93, .5.  Got IV Vanc, Zosyn, and Clinda.  (16 Sep 2024 08:13)      PAST MEDICAL & SURGICAL HISTORY:  CVA (cerebrovascular accident)      Type 2 diabetes mellitus treated with insulin      No significant past surgical history          PREVIOUS DIAGNOSTIC TESTING:      ECHO  FINDINGS:    STRESS  FINDINGS:    CATHETERIZATION  FINDINGS:    MEDICATIONS  (STANDING):  aspirin enteric coated 81 milliGRAM(s) Oral daily  atorvastatin 80 milliGRAM(s) Oral at bedtime  chlorhexidine 2% Cloths 1 Application(s) Topical daily  clopidogrel Tablet 75 milliGRAM(s) Oral daily  collagenase Ointment 1 Application(s) Topical daily  collagenase Ointment 1 Application(s) Topical daily  Dakins Solution - 1/4 Strength 1 Application(s) Topical daily  dextrose 5%. 1000 milliLiter(s) (50 mL/Hr) IV Continuous <Continuous>  dextrose 5%. 1000 milliLiter(s) (100 mL/Hr) IV Continuous <Continuous>  dextrose 50% Injectable 25 Gram(s) IV Push once  dextrose 50% Injectable 25 Gram(s) IV Push once  dextrose 50% Injectable 12.5 Gram(s) IV Push once  enoxaparin Injectable 40 milliGRAM(s) SubCutaneous every 24 hours  glucagon  Injectable 1 milliGRAM(s) IntraMuscular once  influenza   Vaccine 0.5 milliLiter(s) IntraMuscular once  insulin glargine Injectable (LANTUS) 25 Unit(s) SubCutaneous every morning  insulin lispro (ADMELOG) corrective regimen sliding scale   SubCutaneous three times a day before meals  insulin lispro (ADMELOG) corrective regimen sliding scale   SubCutaneous at bedtime  insulin lispro Injectable (ADMELOG) 3 Unit(s) SubCutaneous three times a day before meals  lidocaine 1% Injectable 20 milliLiter(s) Local Injection once  piperacillin/tazobactam IVPB.. 3.375 Gram(s) IV Intermittent every 8 hours    MEDICATIONS  (PRN):  acetaminophen     Tablet .. 650 milliGRAM(s) Oral every 6 hours PRN Temp greater or equal to 38C (100.4F), Mild Pain (1 - 3)  dextrose Oral Gel 15 Gram(s) Oral once PRN Blood Glucose LESS THAN 70 milliGRAM(s)/deciliter  traMADol 25 milliGRAM(s) Oral every 6 hours PRN Moderate Pain (4 - 6)  traMADol 50 milliGRAM(s) Oral every 6 hours PRN Severe Pain (7 - 10)      FAMILY HISTORY:  No pertinent family history in first degree relatives        SOCIAL HISTORY:    CIGARETTES:    ALCOHOL:    REVIEW OF SYSTEMS:  CONSTITUTIONAL: No fever, weight loss, or fatigue  EYES: No eye pain, visual disturbances, or discharge  ENMT:  No difficulty hearing, tinnitus, vertigo; No sinus or throat pain  NECK: No pain or stiffness  RESPIRATORY: No cough, wheezing, chills or hemoptysis; No shortness of breath  CARDIOVASCULAR: No chest pain, palpitations, dizziness, or leg swelling  GASTROINTESTINAL: No abdominal or epigastric pain. No nausea, vomiting, or hematemesis; No diarrhea or constipation. No melena or hematochezia.  GENITOURINARY: No dysuria, frequency, hematuria, or incontinence  NEUROLOGICAL: No headaches, memory loss, loss of strength, numbness, or tremors  SKIN: No itching, burning, rashes, or lesions   LYMPH NODES: No enlarged glands  ENDOCRINE: No heat or cold intolerance; No hair loss  MUSCULOSKELETAL: No joint pain or swelling; No muscle, back, or extremity pain  PSYCHIATRIC: No depression, anxiety, mood swings, or difficulty sleeping  HEME/LYMPH: No easy bruising, or bleeding gums  ALLERY AND IMMUNOLOGIC: No hives or eczema    Vital Signs Last 24 Hrs  T(C): 36.9 (26 Sep 2024 20:58), Max: 37.1 (26 Sep 2024 06:09)  T(F): 98.5 (26 Sep 2024 20:58), Max: 98.7 (26 Sep 2024 06:09)  HR: 89 (26 Sep 2024 20:58) (70 - 89)  BP: 137/67 (26 Sep 2024 20:58) (137/67 - 148/82)  BP(mean): 99 (26 Sep 2024 06:09) (99 - 99)  RR: 18 (26 Sep 2024 20:58) (18 - 18)  SpO2: 98% (26 Sep 2024 20:58) (97% - 98%)    Parameters below as of 26 Sep 2024 20:58  Patient On (Oxygen Delivery Method): room air          PHYSICAL EXAM:  GENERAL: NAD, well-groomed, well-developed  HEAD:  Atraumatic, Normocephalic  EYES: EOMI, PERRLA, conjunctiva and sclera clear  ENMT: No tonsillar erythema, exudates, or enlargement; Moist mucous membranes, Good dentition, No lesions  NECK: Supple, No JVD, Normal thyroid  NERVOUS SYSTEM:  Alert & Oriented X3, Good concentration; Motor Strength 5/5 B/L upper and lower extremities; DTRs 2+ intact and symmetric  CHEST/LUNG: Clear to percussion bilaterally; No rales, rhonchi, wheezing, or rubs  HEART: Regular rate and rhythm; No murmurs, rubs, or gallops  ABDOMEN: Soft, Nontender, Nondistended; Bowel sounds present  EXTREMITIES:  2+ Peripheral Pulses, No clubbing, cyanosis, or edema  LYMPH: No lymphadenopathy noted  SKIN: No rashes or lesions      INTERPRETATION OF TELEMETRY:    ECG:    [facility]     LABS:                        12.8   13.09 )-----------( 580      ( 26 Sep 2024 06:52 )             38.1     09-26    132[L]  |  98  |  14  ----------------------------<  226[H]  4.3   |  22  |  0.89    Ca    9.0      26 Sep 2024 06:52  Phos  3.1     09-26  Mg     2.30     09-26            Urinalysis Basic - ( 26 Sep 2024 06:52 )    Color: x / Appearance: x / SG: x / pH: x  Gluc: 226 mg/dL / Ketone: x  / Bili: x / Urobili: x   Blood: x / Protein: x / Nitrite: x   Leuk Esterase: x / RBC: x / WBC x   Sq Epi: x / Non Sq Epi: x / Bacteria: x      Lipid Panel:   I&O's Summary      RADIOLOGY & ADDITIONAL STUDIES:    CONCLUSIONS:  1. Normal left ventricular internal dimensions and wall  thicknesses.  2. Normal left ventricular systolic function. No segmental  wall motion abnormalities.  3. Mild diastolic dysfunction (Stage I).  4. Normal right ventricular size and function.  5. Agitated saline injection demonstrates no evidence of a  patent foramen ovale.  *** No previousEcho exam.  ------------------------------------------------------------------------  Confirmed on  10/13/2018 - 13:01:44 by Jelena Zafar MD  ------------------------------------------------------------------------

## 2024-09-26 NOTE — PROGRESS NOTE ADULT - PROBLEM SELECTOR PLAN 8
Pt with history of CVA ~2 years ago with residual left-sided weakness and numbness.  - C/w ASA 81 mg daily   - resact plavix

## 2024-09-26 NOTE — PROGRESS NOTE ADULT - PROBLEM SELECTOR PROBLEM 7
Type 2 diabetes mellitus treated with insulin

## 2024-09-26 NOTE — PROGRESS NOTE ADULT - PROBLEM SELECTOR PLAN 6
no signs of active bleeding , f/u closely

## 2024-09-26 NOTE — PROGRESS NOTE ADULT - PROBLEM SELECTOR PLAN 5
Mild, with AST 41 and ALT 65. Unclear etiology. Possibly in setting of sepsis. Pt wit no abdominal pain or tenderness on exam.   - Trend LFTs
Mild, with AST 41 and ALT 65. Unclear etiology. Possibly in setting of sepsis. Pt wit no abdominal pain or tenderness on exam.   - Trend LFTs
Mild, with AST 41 and ALT 65. Unclear etiology. Possibly in setting of sepsis. Pt wit no abdominal pain or tenderness on exam.   - Trend LFTs for now
Mild, with AST 41 and ALT 65. Unclear etiology. Possibly in setting of sepsis. Pt wit no abdominal pain or tenderness on exam.   - Trend LFTs for now
Mild, with AST 41 and ALT 65. Unclear etiology. Possibly in setting of sepsis. Pt wit no abdominal pain or tenderness on exam.   - Trend LFTs
Mild, with AST 41 and ALT 65. Unclear etiology. Possibly in setting of sepsis. Pt wit no abdominal pain or tenderness on exam.   - Trend LFTs for now
Mild, with AST 41 and ALT 65. Unclear etiology. Possibly in setting of sepsis. Pt wit no abdominal pain or tenderness on exam.   - Trend LFTs for now

## 2024-09-26 NOTE — PROGRESS NOTE ADULT - PROBLEM SELECTOR PLAN 2
- 9/15 s/p bedside Left foot incision and drainage: Left foot plantar 1st interspace wound to subQ, blister with serous drainage and fluctuance of the dorsal 1st interspace, erythema of the foot globally, strong malodor, scant pus, right foot no signs of infection  - Left foot X-ray: 1st interspace soft tissue emphysema, no OM   - Left foot wound culture pending
- 9/15 s/p bedside Left foot incision and drainage: Left foot plantar 1st interspace wound to subQ, blister with serous drainage and fluctuance of the dorsal 1st interspace, erythema of the foot globally, strong malodor, scant pus, right foot no signs of infection  - Left foot X-ray: 1st interspace soft tissue emphysema, no OM
- 9/15 s/p bedside Left foot incision and drainage: Left foot plantar 1st interspace wound to subQ, blister with serous drainage and fluctuance of the dorsal 1st interspace, erythema of the foot globally, strong malodor, scant pus, right foot no signs of infection  - Left foot X-ray: 1st interspace soft tissue emphysema, no OM   - Left foot wound culture pending
- 9/15 s/p bedside Left foot incision and drainage: Left foot plantar 1st interspace wound to subQ, blister with serous drainage and fluctuance of the dorsal 1st interspace, erythema of the foot globally, strong malodor, scant pus, right foot no signs of infection  - Left foot X-ray: 1st interspace soft tissue emphysema, no OM
- 9/15 s/p bedside Left foot incision and drainage: Left foot plantar 1st interspace wound to subQ, blister with serous drainage and fluctuance of the dorsal 1st interspace, erythema of the foot globally, strong malodor, scant pus, right foot no signs of infection  - Left foot X-ray: 1st interspace soft tissue emphysema, no OM   - Left foot wound culture pending
- 9/15 s/p bedside Left foot incision and drainage: Left foot plantar 1st interspace wound to subQ, blister with serous drainage and fluctuance of the dorsal 1st interspace, erythema of the foot globally, strong malodor, scant pus, right foot no signs of infection  - Left foot X-ray: 1st interspace soft tissue emphysema, no OM
- 9/15 s/p bedside Left foot incision and drainage: Left foot plantar 1st interspace wound to subQ, blister with serous drainage and fluctuance of the dorsal 1st interspace, erythema of the foot globally, strong malodor, scant pus, right foot no signs of infection  - Left foot X-ray: 1st interspace soft tissue emphysema, no OM   - Left foot wound culture pending

## 2024-09-26 NOTE — DISCHARGE NOTE NURSING/CASE MANAGEMENT/SOCIAL WORK - NSDCVIVACCINE_GEN_ALL_CORE_FT
influenza, injectable, quadrivalent, preservative free; 18-Oct-2018 18:20; Sa, Renee Saunders (RN); [employer]; [id number] (Exp. Date: 30-Jun-2019); IntraMuscular; Deltoid Left.; 0.5 milliLiter(s); VIS (VIS Published: 07-Aug-2015, VIS Presented: 18-Oct-2018);

## 2024-09-26 NOTE — PROGRESS NOTE ADULT - PROBLEM SELECTOR PROBLEM 3
Cellulitis of left lower extremity

## 2024-09-26 NOTE — PROGRESS NOTE ADULT - PROBLEM SELECTOR PROBLEM 8
History of CVA (cerebrovascular accident)

## 2024-09-26 NOTE — DISCHARGE NOTE NURSING/CASE MANAGEMENT/SOCIAL WORK - NSDCFUADDAPPT_GEN_ALL_CORE_FT
Podiatry Discharge Instructions:  Follow up: Please follow up with Dr. Pal within 1 week of discharge from the hospital, please call [phone] for appointment and discuss that you recently were seen in the hospital.  Wound Care: Please apply santyl with Black granulofoam VAC at 125mmHg onmonday , wednesday and friday changes  Weight bearing: Please weight bear as tolerated in a surgical shoe.  Antibiotics: Please continue as instructed.

## 2024-09-27 VITALS
HEART RATE: 88 BPM | DIASTOLIC BLOOD PRESSURE: 78 MMHG | SYSTOLIC BLOOD PRESSURE: 145 MMHG | OXYGEN SATURATION: 100 % | RESPIRATION RATE: 18 BRPM | TEMPERATURE: 98 F

## 2024-09-27 LAB
ANION GAP SERPL CALC-SCNC: 13 MMOL/L — SIGNIFICANT CHANGE UP (ref 7–14)
BASOPHILS # BLD AUTO: 0.08 K/UL — SIGNIFICANT CHANGE UP (ref 0–0.2)
BASOPHILS NFR BLD AUTO: 0.5 % — SIGNIFICANT CHANGE UP (ref 0–2)
BUN SERPL-MCNC: 16 MG/DL — SIGNIFICANT CHANGE UP (ref 7–23)
CALCIUM SERPL-MCNC: 9.4 MG/DL — SIGNIFICANT CHANGE UP (ref 8.4–10.5)
CHLORIDE SERPL-SCNC: 97 MMOL/L — LOW (ref 98–107)
CO2 SERPL-SCNC: 21 MMOL/L — LOW (ref 22–31)
CREAT SERPL-MCNC: 0.93 MG/DL — SIGNIFICANT CHANGE UP (ref 0.5–1.3)
EGFR: 94 ML/MIN/1.73M2 — SIGNIFICANT CHANGE UP
EOSINOPHIL # BLD AUTO: 0.38 K/UL — SIGNIFICANT CHANGE UP (ref 0–0.5)
EOSINOPHIL NFR BLD AUTO: 2.6 % — SIGNIFICANT CHANGE UP (ref 0–6)
GLUCOSE BLDC GLUCOMTR-MCNC: 261 MG/DL — HIGH (ref 70–99)
GLUCOSE BLDC GLUCOMTR-MCNC: 263 MG/DL — HIGH (ref 70–99)
GLUCOSE SERPL-MCNC: 224 MG/DL — HIGH (ref 70–99)
HCT VFR BLD CALC: 41.3 % — SIGNIFICANT CHANGE UP (ref 39–50)
HGB BLD-MCNC: 13.4 G/DL — SIGNIFICANT CHANGE UP (ref 13–17)
IANC: 9.76 K/UL — HIGH (ref 1.8–7.4)
IMM GRANULOCYTES NFR BLD AUTO: 0.5 % — SIGNIFICANT CHANGE UP (ref 0–0.9)
LYMPHOCYTES # BLD AUTO: 21.9 % — SIGNIFICANT CHANGE UP (ref 13–44)
LYMPHOCYTES # BLD AUTO: 3.2 K/UL — SIGNIFICANT CHANGE UP (ref 1–3.3)
MAGNESIUM SERPL-MCNC: 2.3 MG/DL — SIGNIFICANT CHANGE UP (ref 1.6–2.6)
MCHC RBC-ENTMCNC: 27.6 PG — SIGNIFICANT CHANGE UP (ref 27–34)
MCHC RBC-ENTMCNC: 32.4 GM/DL — SIGNIFICANT CHANGE UP (ref 32–36)
MCV RBC AUTO: 85.2 FL — SIGNIFICANT CHANGE UP (ref 80–100)
MONOCYTES # BLD AUTO: 1.13 K/UL — HIGH (ref 0–0.9)
MONOCYTES NFR BLD AUTO: 7.7 % — SIGNIFICANT CHANGE UP (ref 2–14)
NEUTROPHILS # BLD AUTO: 9.76 K/UL — HIGH (ref 1.8–7.4)
NEUTROPHILS NFR BLD AUTO: 66.8 % — SIGNIFICANT CHANGE UP (ref 43–77)
NRBC # BLD: 0 /100 WBCS — SIGNIFICANT CHANGE UP (ref 0–0)
NRBC # FLD: 0 K/UL — SIGNIFICANT CHANGE UP (ref 0–0)
PHOSPHATE SERPL-MCNC: 3.1 MG/DL — SIGNIFICANT CHANGE UP (ref 2.5–4.5)
PLATELET # BLD AUTO: 622 K/UL — HIGH (ref 150–400)
POTASSIUM SERPL-MCNC: 4.3 MMOL/L — SIGNIFICANT CHANGE UP (ref 3.5–5.3)
POTASSIUM SERPL-SCNC: 4.3 MMOL/L — SIGNIFICANT CHANGE UP (ref 3.5–5.3)
RBC # BLD: 4.85 M/UL — SIGNIFICANT CHANGE UP (ref 4.2–5.8)
RBC # FLD: 12.5 % — SIGNIFICANT CHANGE UP (ref 10.3–14.5)
SODIUM SERPL-SCNC: 131 MMOL/L — LOW (ref 135–145)
WBC # BLD: 14.62 K/UL — HIGH (ref 3.8–10.5)
WBC # FLD AUTO: 14.62 K/UL — HIGH (ref 3.8–10.5)

## 2024-09-27 RX ORDER — ACETAMINOPHEN 325 MG
2 TABLET ORAL
Qty: 0 | Refills: 0 | DISCHARGE
Start: 2024-09-27

## 2024-09-27 RX ORDER — TRAMADOL HYDROCHLORIDE 50 MG/1
0.5 TABLET, COATED ORAL
Qty: 14 | Refills: 0
Start: 2024-09-27 | End: 2024-10-03

## 2024-09-27 RX ORDER — SODIUM HYPOCHLORITE 0.057 %
1 GEL (GRAM) TOPICAL
Qty: 0 | Refills: 0 | DISCHARGE
Start: 2024-09-27

## 2024-09-27 RX ADMIN — Medication 3: at 13:27

## 2024-09-27 RX ADMIN — Medication 3 UNIT(S): at 13:28

## 2024-09-27 RX ADMIN — TRAMADOL HYDROCHLORIDE 50 MILLIGRAM(S): 50 TABLET, COATED ORAL at 13:40

## 2024-09-27 RX ADMIN — Medication 3 UNIT(S): at 09:09

## 2024-09-27 RX ADMIN — CHLORHEXIDINE GLUCONATE ORAL RINSE 1 APPLICATION(S): 1.2 SOLUTION DENTAL at 13:34

## 2024-09-27 RX ADMIN — Medication 1 APPLICATION(S): at 13:29

## 2024-09-27 RX ADMIN — INSULIN GLARGINE 25 UNIT(S): 300 INJECTION, SOLUTION SUBCUTANEOUS at 09:09

## 2024-09-27 RX ADMIN — TRAMADOL HYDROCHLORIDE 50 MILLIGRAM(S): 50 TABLET, COATED ORAL at 07:38

## 2024-09-27 RX ADMIN — Medication 75 MILLIGRAM(S): at 13:28

## 2024-09-27 RX ADMIN — Medication 81 MILLIGRAM(S): at 13:28

## 2024-09-27 RX ADMIN — Medication 3: at 09:09

## 2024-09-27 RX ADMIN — PIPERACILLIN SODIUM AND TAZOBACTAM SODIUM 25 GRAM(S): 12; 1.5 INJECTION, POWDER, LYOPHILIZED, FOR SOLUTION INTRAVENOUS at 06:35

## 2024-09-27 NOTE — PROGRESS NOTE ADULT - PROVIDER SPECIALTY LIST ADULT
Podiatry
Podiatry
Infectious Disease
Internal Medicine
Podiatry
Infectious Disease
Podiatry
Infectious Disease
Podiatry
Internal Medicine
Internal Medicine
Cardiology
Internal Medicine
Cardiology

## 2024-09-27 NOTE — PROGRESS NOTE ADULT - REASON FOR ADMISSION
Left foot wound infection

## 2024-09-27 NOTE — PROGRESS NOTE ADULT - SUBJECTIVE AND OBJECTIVE BOX
Patient is a 59y old  Male who presents with a chief complaint of Left foot wound infection (26 Sep 2024 09:42)       INTERVAL HPI/OVERNIGHT EVENTS:  Patient seen and evaluated at bedside.  Pt is resting comfortable in NAD. Denies N/V/F/C.      Allergies    No Known Allergies    Intolerances        Vital Signs Last 24 Hrs  T(C): 36.5 (27 Sep 2024 05:56), Max: 37.1 (26 Sep 2024 16:02)  T(F): 97.7 (27 Sep 2024 05:56), Max: 98.7 (26 Sep 2024 16:02)  HR: 82 (27 Sep 2024 05:56) (70 - 89)  BP: 137/75 (27 Sep 2024 05:56) (137/67 - 145/80)  BP(mean): --  RR: 17 (27 Sep 2024 05:56) (17 - 18)  SpO2: 97% (27 Sep 2024 05:56) (97% - 98%)    Parameters below as of 27 Sep 2024 05:56  Patient On (Oxygen Delivery Method): room air        LABS:                        13.4   14.62 )-----------( 622      ( 27 Sep 2024 06:09 )             41.3     09-27    131[L]  |  97[L]  |  16  ----------------------------<  224[H]  4.3   |  21[L]  |  0.93    Ca    9.4      27 Sep 2024 06:09  Phos  3.1     09-27  Mg     2.30     09-27        Urinalysis Basic - ( 27 Sep 2024 06:09 )    Color: x / Appearance: x / SG: x / pH: x  Gluc: 224 mg/dL / Ketone: x  / Bili: x / Urobili: x   Blood: x / Protein: x / Nitrite: x   Leuk Esterase: x / RBC: x / WBC x   Sq Epi: x / Non Sq Epi: x / Bacteria: x      CAPILLARY BLOOD GLUCOSE      POCT Blood Glucose.: 230 mg/dL (26 Sep 2024 22:10)  POCT Blood Glucose.: 219 mg/dL (26 Sep 2024 17:54)  POCT Blood Glucose.: 227 mg/dL (26 Sep 2024 13:05)  POCT Blood Glucose.: 211 mg/dL (26 Sep 2024 08:56)      Lower Extremity Physical Exam:  Vascular: DP/PT 1/4, B/L, CFT <3 seconds B/L, Temperature gradient warm to warm, left, warm to cool, right  Neuro: Epicritic sensation intact to the level of digits, B/L.  Musculoskeletal/Ortho: unremarkable  Skin: 9/20 s/p bedside left foot hallux disarticulation, open: no purulence, mild malodor, no drainage, bleeding controlled. Left foot medial 2nd digit necrotic tissue, no purulence. Right foot no open wounds, no signs of infection.      RADIOLOGY & ADDITIONAL TESTS:

## 2024-09-27 NOTE — PROGRESS NOTE ADULT - ASSESSMENT
59M presents for 9/20 s/p bedside left foot hallux disarticulation, open.  - Pt was seen and evaluated  - Afebrile, WBC 14.62  - 9/20 s/p bedside left foot hallux disarticulation, open: no purulence, mild malodor, no drainage, bleeding controlled. Left foot medial 2nd digit medial necrotic tissue, no purulence. Right foot no open wounds, no signs of infection.  - Left foot X-ray: 1st interspace soft tissue emphysema, no OM   - Left foot MRI: OM of the distal phalanx of the hallux  - Left foot wound culture: proteus vulgaris, klebsiella pneumonia, aeromonas hydrophila, staph lugdunesis  - ID recs appreciated   - Patient is stable for discharge from a podiatry standpoint.   - Wound care orders and followup information can be found in discharge paperwork.   - Seen with attending

## 2024-10-29 NOTE — SWALLOW VFSS/MBS ASSESSMENT ADULT - ROSENBEK'S PENETRATION ASPIRATION SCALE
[facility] Refill Guideline reviewed.  Medication meets criteria for refill.    (1) no aspiration, contrast does not enter airway 1 & 2

## 2025-06-15 NOTE — PATIENT PROFILE ADULT - FALL HARM RISK - CONCLUSION
Subjective/Objective:     HPI-Cardiology/Events/Updates  Pt evaluated at bedside. No overnight events and Pt has no complaints. Radiology tests and hospital records, were reviewed, as well as previous notes on this patient.         MEDICATIONS  (STANDING):  aMIOdarone    Tablet 200 milliGRAM(s) Oral daily  apixaban 5 milliGRAM(s) Oral every 12 hours  atorvastatin 40 milliGRAM(s) Oral at bedtime  bumetanide Injectable 2 milliGRAM(s) IV Push every 12 hours  cefepime   IVPB 2000 milliGRAM(s) IV Intermittent every 12 hours  chlorhexidine 0.12% Liquid 15 milliLiter(s) Oral Mucosa every 12 hours  chlorhexidine 2% Cloths 1 Application(s) Topical <User Schedule>  clopidogrel Tablet 75 milliGRAM(s) Oral daily  dexMEDEtomidine Infusion 0.4 MICROgram(s)/kG/Hr (8.45 mL/Hr) IV Continuous <Continuous>  dextrose 5%. 1000 milliLiter(s) (100 mL/Hr) IV Continuous <Continuous>  dextrose 5%. 1000 milliLiter(s) (50 mL/Hr) IV Continuous <Continuous>  dextrose 50% Injectable 25 Gram(s) IV Push once  dextrose 50% Injectable 12.5 Gram(s) IV Push once  dextrose 50% Injectable 25 Gram(s) IV Push once  ezetimibe 10 milliGRAM(s) Oral daily  famotidine    Tablet 20 milliGRAM(s) Oral daily  fentaNYL   Infusion. 0.5 MICROgram(s)/kG/Hr (4.23 mL/Hr) IV Continuous <Continuous>  glucagon  Injectable 1 milliGRAM(s) IntraMuscular once  insulin glargine Injectable (LANTUS) 34 Unit(s) SubCutaneous every morning  insulin lispro (ADMELOG) corrective regimen sliding scale   SubCutaneous every 6 hours  norepinephrine Infusion 0.05 MICROgram(s)/kG/Min (7.92 mL/Hr) IV Continuous <Continuous>  propofol Infusion 5 MICROgram(s)/kG/Min (2.54 mL/Hr) IV Continuous <Continuous>  tiotropium 2.5 MICROgram(s) Inhaler 2 Puff(s) Inhalation daily    MEDICATIONS  (PRN):  acetaminophen     Tablet .. 650 milliGRAM(s) Oral every 6 hours PRN Temp greater or equal to 38C (100.4F), Mild Pain (1 - 3)  albuterol/ipratropium for Nebulization 3 milliLiter(s) Nebulizer every 6 hours PRN Shortness of Breath and/or Wheezing  aluminum hydroxide/magnesium hydroxide/simethicone Suspension 30 milliLiter(s) Oral every 4 hours PRN Dyspepsia  dextrose Oral Gel 15 Gram(s) Oral once PRN Blood Glucose LESS THAN 70 milliGRAM(s)/deciliter  guaiFENesin  milliGRAM(s) Oral every 12 hours PRN for cough  melatonin 5 milliGRAM(s) Oral at bedtime PRN Insomnia  ondansetron Injectable 4 milliGRAM(s) IV Push every 8 hours PRN Nausea and/or Vomiting          Vital Signs Last 24 Hrs  T(C): 37.8 (15 Kleber 2025 09:00), Max: 38.1 (15 Kleber 2025 05:30)  T(F): 100 (15 Kelber 2025 09:00), Max: 100.6 (15 Kleber 2025 05:30)  HR: 66 (15 Kleber 2025 09:00) (64 - 89)  BP: 91/52 (15 Kleber 2025 09:00) (76/51 - 123/67)  BP(mean): 67 (15 Kleber 2025 09:00) (60 - 94)  RR: 24 (15 Kleber 2025 09:00) (24 - 30)  SpO2: 100% (15 Kleber 2025 09:00) (99% - 100%)    Parameters below as of 15 Kleber 2025 09:00  Patient On (Oxygen Delivery Method): ventilator    O2 Concentration (%): 40  I&O's Detail    14 Jun 2025 07:01  -  15 Kleber 2025 07:00  --------------------------------------------------------  IN:    FentaNYL: 298.3 mL    Insulin: 6 mL    Insulin: 24 mL    Insulin: 18 mL    Insulin: 7 mL    IV PiggyBack: 650 mL    Norepinephrine: 354.5 mL    Propofol: 112 mL  Total IN: 1469.8 mL    OUT:    Indwelling Catheter - Urethral (mL): 630 mL    Voided (mL): 535 mL  Total OUT: 1165 mL    Total NET: 304.8 mL          PHYSICAL EXAM:  GENERAL:  68y/o Male NAD  HEAD:  Atraumatic, Normocephalic  EYES: EOMI, PERRLA, conjunctiva and sclera clear  NECK: Supple, No JVD, no cervical lymphadenopathy, non-tender  CHEST/LUNG: Rales to auscultation bilaterally  HEART: Regular rate and rhythm; S1&S2  ABDOMEN: Soft, Nontender, Nondistended x 4 quadrants; Bowel sounds present  EXTREMITIES:  Peripheral Pulses Present, B/L LE pitting edema 2+  NEUROLOGY: WNL  SKIN: WNL        EKG/ TELEM:  < from: 12 Lead ECG (06.14.25 @ 04:54) >   Atrial-sensed ventricular-paced rhythm  Biventricular pacemaker detected  Abnormal ECG    < end of copied text >          LABS:                          13.2   46.99 )-----------( 431      ( 15 Kleber 2025 06:45 )             43.8     PT/INR - ( 15 Kleber 2025 06:45 )   PT: 21.30 sec;   INR: 1.78 ratio           15 Kleber 2025 06:45    137    |  101    |  37[H]  ----------------------------<  114[H]  5.6[H]   |  21     |  2.2[H]  14 Jun 2025 19:24    140    |  105    |  28[H]  ----------------------------<  175[H]  5.3[H]   |  18     |  1.8[H]    Ca    9.5        15 Kleber 2025 06:45  Ca    9.2        14 Jun 2025 19:24  Phos  4.6       15 Kleber 2025 06:45  Phos  3.6       14 Jun 2025 19:24  Mg     2.2       15 Kleber 2025 06:45  Mg     2.4       14 Jun 2025 19:24    TPro  5.8[L]  /  Alb  3.0[L]  /  TBili  0.4    /  DBili  x      /  AST  12     /  ALT  8      /  AlkPhos  82     15 Kleber 2025 06:45  TPro  6.5    /  Alb  3.5    /  TBili  0.4    /  DBili  x      /  AST  20     /  ALT  11     /  AlkPhos  95     14 Jun 2025 19:24          Diagnostic testing:  < from: Xray Chest 1 View- PORTABLE-Routine (Xray Chest 1 View- PORTABLE-Routine in AM.) (06.15.25 @ 07:33) >  FINDINGS:    Support devices: ETT with its tip above the oscar, NGT with its tip   below the diaphragm and a left-sided permanent pacemaker.    Cardiac/mediastinum/hilum: Stable.    Lung parenchyma/Pleura: Bilateral opacities, unchanged. No pneumothorax   is seen.    Skeleton/soft tissues: Stable.      IMPRESSION: Low lung volume.    Bilateral opacities, unchanged.    Support tubes as above.    Left-sided permanent pacemaker.    --- End of Report ---    < end of copied text >          < from: TTE Echo Complete w/o Contrast w/ Doppler (04.20.25 @ 13:14) >    Summary:   1. LV Ejection Fraction by Beck's Method with a biplane EF of 30 %.   2. Severely decreased global left ventricular systolic function.   3. Endocardial visualization was enhanced with intravenous echo contrast.   4. Left atrial enlargement.   5. Mildly reduced RV systolic function.   6. Mild mitral valve regurgitation.   7. Thickening of the anterior and posterior mitral valve leaflets.   8. Moderate tricuspid regurgitation.   9. Bioprosthetic aortic valve in place. Peak/mean taxtvvxi18.9/15.3   mmHg.  10. Estimated pulmonary artery systolic pressure is 59.9 mmHg assuming a   right atrial pressure of 3 mmHg, which is consistent with moderate   pulmonary hypertension.  11. Likely PFO noted by color doppler wtih left to right shunt.    < end of copied text >        Assessment and Recommendations:   69 year old male with past medical history of  COPD (on 2L home O2), DM 2  HFrEF 30%, CAD,  ischemic cardiomyopathy s/p CRT-D, HTN, paroxysmal A-fib (status post ablation 1/14/25) on amiodarone and  Eliquis who presented to ED w c/o LESLIE and b/l  leg swelling.         Impression:  #SOB/LESLIE: Multifactorial  #HFrEF (EF 30%, s/p CRT-D)  #CAD s/p PCI to RCA and LAD  #NSTEMI/ Hx of frequent NSVT   #Paroxysmal AFib on Eliquis        Plan:  - Currently intubated, mechanically ventilated, and sedated  - Clinically still volume overloaded, but volume status improving, and good urine output thus far  - Cont with Bumex 2mg IVP BID. Monitor volume status closely  - Check BMP daily and monitor renal function. Maintain K >4.0, Mg >2.2    - Strict intake and output, maintain negative balance for now  - pBNP 2894. Repeat closer to discharge  - Restart home GDMT for HFrEF, when stable/medically appropriate   - For Afib - c/w AC, BB and amiodarone   - Pt needs ischemic eval, but has refused in the past          Fall with Harm Risk